# Patient Record
Sex: MALE | Race: AMERICAN INDIAN OR ALASKA NATIVE | NOT HISPANIC OR LATINO | Employment: OTHER | URBAN - METROPOLITAN AREA
[De-identification: names, ages, dates, MRNs, and addresses within clinical notes are randomized per-mention and may not be internally consistent; named-entity substitution may affect disease eponyms.]

---

## 2017-09-09 ENCOUNTER — APPOINTMENT (OUTPATIENT)
Dept: RADIOLOGY | Facility: MEDICAL CENTER | Age: 62
DRG: 987 | End: 2017-09-09
Attending: INTERNAL MEDICINE
Payer: MEDICARE

## 2017-09-09 ENCOUNTER — APPOINTMENT (OUTPATIENT)
Dept: RADIOLOGY | Facility: MEDICAL CENTER | Age: 62
DRG: 987 | End: 2017-09-09
Attending: EMERGENCY MEDICINE
Payer: MEDICARE

## 2017-09-09 ENCOUNTER — HOSPITAL ENCOUNTER (INPATIENT)
Facility: MEDICAL CENTER | Age: 62
LOS: 11 days | DRG: 987 | End: 2017-09-20
Attending: EMERGENCY MEDICINE | Admitting: HOSPITALIST
Payer: MEDICARE

## 2017-09-09 ENCOUNTER — HOSPITAL ENCOUNTER (OUTPATIENT)
Dept: RADIOLOGY | Facility: MEDICAL CENTER | Age: 62
End: 2017-09-09

## 2017-09-09 ENCOUNTER — RESOLUTE PROFESSIONAL BILLING HOSPITAL PROF FEE (OUTPATIENT)
Dept: MEDSURG UNIT | Facility: MEDICAL CENTER | Age: 62
End: 2017-09-09
Payer: MEDICARE

## 2017-09-09 DIAGNOSIS — G40.901 STATUS EPILEPTICUS (HCC): ICD-10-CM

## 2017-09-09 PROBLEM — R56.9 SEIZURES (HCC): Status: ACTIVE | Noted: 2017-09-09

## 2017-09-09 PROBLEM — Z97.8 ENDOTRACHEALLY INTUBATED: Status: ACTIVE | Noted: 2017-09-09

## 2017-09-09 LAB
ALBUMIN SERPL BCP-MCNC: 4 G/DL (ref 3.2–4.9)
ALBUMIN/GLOB SERPL: 1.3 G/DL
ALP SERPL-CCNC: 138 U/L (ref 30–99)
ALT SERPL-CCNC: 43 U/L (ref 2–50)
AMPHET UR QL SCN: NEGATIVE
ANION GAP SERPL CALC-SCNC: 9 MMOL/L (ref 0–11.9)
APTT PPP: 22.2 SEC (ref 24.7–36)
AST SERPL-CCNC: 48 U/L (ref 12–45)
BARBITURATES UR QL SCN: NEGATIVE
BASE EXCESS BLDA CALC-SCNC: 2 MMOL/L (ref -4–3)
BASOPHILS # BLD AUTO: 0.5 % (ref 0–1.8)
BASOPHILS # BLD: 0.05 K/UL (ref 0–0.12)
BENZODIAZ UR QL SCN: POSITIVE
BILIRUB SERPL-MCNC: 0.6 MG/DL (ref 0.1–1.5)
BODY TEMPERATURE: ABNORMAL DEGREES
BUN SERPL-MCNC: 5 MG/DL (ref 8–22)
BZE UR QL SCN: NEGATIVE
CALCIUM SERPL-MCNC: 8.6 MG/DL (ref 8.5–10.5)
CANNABINOIDS UR QL SCN: NEGATIVE
CHLORIDE SERPL-SCNC: 93 MMOL/L (ref 96–112)
CO2 BLDA-SCNC: 29 MMOL/L (ref 20–33)
CO2 SERPL-SCNC: 25 MMOL/L (ref 20–33)
CREAT SERPL-MCNC: 0.78 MG/DL (ref 0.5–1.4)
EOSINOPHIL # BLD AUTO: 0 K/UL (ref 0–0.51)
EOSINOPHIL NFR BLD: 0 % (ref 0–6.9)
ERYTHROCYTE [DISTWIDTH] IN BLOOD BY AUTOMATED COUNT: 52.6 FL (ref 35.9–50)
ETHANOL BLD-MCNC: 0 G/DL
GFR SERPL CREATININE-BSD FRML MDRD: >60 ML/MIN/1.73 M 2
GLOBULIN SER CALC-MCNC: 3 G/DL (ref 1.9–3.5)
GLUCOSE SERPL-MCNC: 135 MG/DL (ref 65–99)
GRAM STN SPEC: NORMAL
HCO3 BLDA-SCNC: 27.4 MMOL/L (ref 17–25)
HCT VFR BLD AUTO: 37 % (ref 42–52)
HGB BLD-MCNC: 13 G/DL (ref 14–18)
IMM GRANULOCYTES # BLD AUTO: 0.07 K/UL (ref 0–0.11)
IMM GRANULOCYTES NFR BLD AUTO: 0.7 % (ref 0–0.9)
INR PPP: 0.86 (ref 0.87–1.13)
LYMPHOCYTES # BLD AUTO: 0.29 K/UL (ref 1–4.8)
LYMPHOCYTES NFR BLD: 2.8 % (ref 22–41)
MCH RBC QN AUTO: 33.7 PG (ref 27–33)
MCHC RBC AUTO-ENTMCNC: 35.1 G/DL (ref 33.7–35.3)
MCV RBC AUTO: 95.9 FL (ref 81.4–97.8)
METHADONE UR QL SCN: NEGATIVE
MONOCYTES # BLD AUTO: 0.39 K/UL (ref 0–0.85)
MONOCYTES NFR BLD AUTO: 3.8 % (ref 0–13.4)
NEUTROPHILS # BLD AUTO: 9.51 K/UL (ref 1.82–7.42)
NEUTROPHILS NFR BLD: 92.2 % (ref 44–72)
NRBC # BLD AUTO: 0 K/UL
NRBC BLD AUTO-RTO: 0 /100 WBC
O2/TOTAL GAS SETTING VFR VENT: 80 %
OPIATES UR QL SCN: NEGATIVE
OXYCODONE UR QL SCN: NEGATIVE
PCO2 BLDA: 45.7 MMHG (ref 26–37)
PCP UR QL SCN: NEGATIVE
PH BLDA: 7.39 [PH] (ref 7.4–7.5)
PHENYTOIN SERPL-MCNC: 9.1 UG/ML (ref 10–20)
PLATELET # BLD AUTO: 299 K/UL (ref 164–446)
PMV BLD AUTO: 7.9 FL (ref 9–12.9)
PO2 BLDA: 101 MMHG (ref 64–87)
POTASSIUM SERPL-SCNC: 4.1 MMOL/L (ref 3.6–5.5)
PROCALCITONIN SERPL-MCNC: 0.28 NG/ML
PROPOXYPH UR QL SCN: NEGATIVE
PROT SERPL-MCNC: 7 G/DL (ref 6–8.2)
PROTHROMBIN TIME: 12 SEC (ref 12–14.6)
RBC # BLD AUTO: 3.86 M/UL (ref 4.7–6.1)
SAO2 % BLDA: 98 % (ref 93–99)
SIGNIFICANT IND 70042: NORMAL
SITE SITE: NORMAL
SODIUM SERPL-SCNC: 127 MMOL/L (ref 135–145)
SOURCE SOURCE: NORMAL
SPECIMEN DRAWN FROM PATIENT: ABNORMAL
WBC # BLD AUTO: 10.3 K/UL (ref 4.8–10.8)

## 2017-09-09 PROCEDURE — 36415 COLL VENOUS BLD VENIPUNCTURE: CPT

## 2017-09-09 PROCEDURE — 700101 HCHG RX REV CODE 250: Performed by: INTERNAL MEDICINE

## 2017-09-09 PROCEDURE — 71010 DX-CHEST-PORTABLE (1 VIEW): CPT

## 2017-09-09 PROCEDURE — 700102 HCHG RX REV CODE 250 W/ 637 OVERRIDE(OP): Performed by: INTERNAL MEDICINE

## 2017-09-09 PROCEDURE — 700105 HCHG RX REV CODE 258: Performed by: INTERNAL MEDICINE

## 2017-09-09 PROCEDURE — 87070 CULTURE OTHR SPECIMN AEROBIC: CPT

## 2017-09-09 PROCEDURE — 85025 COMPLETE CBC W/AUTO DIFF WBC: CPT

## 2017-09-09 PROCEDURE — A9270 NON-COVERED ITEM OR SERVICE: HCPCS | Performed by: INTERNAL MEDICINE

## 2017-09-09 PROCEDURE — 96365 THER/PROPH/DIAG IV INF INIT: CPT

## 2017-09-09 PROCEDURE — 84145 PROCALCITONIN (PCT): CPT

## 2017-09-09 PROCEDURE — 36600 WITHDRAWAL OF ARTERIAL BLOOD: CPT

## 2017-09-09 PROCEDURE — 99291 CRITICAL CARE FIRST HOUR: CPT

## 2017-09-09 PROCEDURE — 80307 DRUG TEST PRSMV CHEM ANLYZR: CPT

## 2017-09-09 PROCEDURE — 700111 HCHG RX REV CODE 636 W/ 250 OVERRIDE (IP)

## 2017-09-09 PROCEDURE — 700111 HCHG RX REV CODE 636 W/ 250 OVERRIDE (IP): Performed by: EMERGENCY MEDICINE

## 2017-09-09 PROCEDURE — 5A1945Z RESPIRATORY VENTILATION, 24-96 CONSECUTIVE HOURS: ICD-10-PCS | Performed by: INTERNAL MEDICINE

## 2017-09-09 PROCEDURE — 82803 BLOOD GASES ANY COMBINATION: CPT

## 2017-09-09 PROCEDURE — 94760 N-INVAS EAR/PLS OXIMETRY 1: CPT

## 2017-09-09 PROCEDURE — 770022 HCHG ROOM/CARE - ICU (200)

## 2017-09-09 PROCEDURE — 85730 THROMBOPLASTIN TIME PARTIAL: CPT

## 2017-09-09 PROCEDURE — 700111 HCHG RX REV CODE 636 W/ 250 OVERRIDE (IP): Performed by: INTERNAL MEDICINE

## 2017-09-09 PROCEDURE — 80185 ASSAY OF PHENYTOIN TOTAL: CPT

## 2017-09-09 PROCEDURE — 85610 PROTHROMBIN TIME: CPT

## 2017-09-09 PROCEDURE — 87205 SMEAR GRAM STAIN: CPT

## 2017-09-09 PROCEDURE — 302135 SEQUENTIAL COMPRESSION MACHINE: Performed by: INTERNAL MEDICINE

## 2017-09-09 PROCEDURE — 700105 HCHG RX REV CODE 258: Performed by: EMERGENCY MEDICINE

## 2017-09-09 PROCEDURE — 96366 THER/PROPH/DIAG IV INF ADDON: CPT

## 2017-09-09 PROCEDURE — 70450 CT HEAD/BRAIN W/O DYE: CPT

## 2017-09-09 PROCEDURE — 94002 VENT MGMT INPAT INIT DAY: CPT

## 2017-09-09 PROCEDURE — 96375 TX/PRO/DX INJ NEW DRUG ADDON: CPT

## 2017-09-09 PROCEDURE — 80053 COMPREHEN METABOLIC PANEL: CPT

## 2017-09-09 PROCEDURE — 302136 NUTRITION PUMP: Performed by: INTERNAL MEDICINE

## 2017-09-09 PROCEDURE — 87040 BLOOD CULTURE FOR BACTERIA: CPT

## 2017-09-09 RX ORDER — THIAMINE MONONITRATE (VIT B1) 100 MG
100 TABLET ORAL DAILY
Status: COMPLETED | OUTPATIENT
Start: 2017-09-10 | End: 2017-09-13

## 2017-09-09 RX ORDER — LORAZEPAM 2 MG/ML
4 INJECTION INTRAMUSCULAR
Status: DISCONTINUED | OUTPATIENT
Start: 2017-09-09 | End: 2017-09-20 | Stop reason: HOSPADM

## 2017-09-09 RX ORDER — FOLIC ACID 1 MG/1
1 TABLET ORAL DAILY
Status: COMPLETED | OUTPATIENT
Start: 2017-09-10 | End: 2017-09-13

## 2017-09-09 RX ORDER — PHENYTOIN SODIUM 100 MG/1
300 CAPSULE, EXTENDED RELEASE ORAL NIGHTLY
Status: DISCONTINUED | OUTPATIENT
Start: 2017-09-09 | End: 2017-09-09

## 2017-09-09 RX ORDER — LIDOCAINE HYDROCHLORIDE 10 MG/ML
1-2 INJECTION, SOLUTION INFILTRATION; PERINEURAL
Status: DISCONTINUED | OUTPATIENT
Start: 2017-09-09 | End: 2017-09-15

## 2017-09-09 RX ORDER — LABETALOL HYDROCHLORIDE 5 MG/ML
10 INJECTION, SOLUTION INTRAVENOUS EVERY 4 HOURS PRN
Status: DISCONTINUED | OUTPATIENT
Start: 2017-09-09 | End: 2017-09-20 | Stop reason: HOSPADM

## 2017-09-09 RX ORDER — BISACODYL 10 MG
10 SUPPOSITORY, RECTAL RECTAL
Status: DISCONTINUED | OUTPATIENT
Start: 2017-09-09 | End: 2017-09-20 | Stop reason: HOSPADM

## 2017-09-09 RX ORDER — CHLORHEXIDINE GLUCONATE ORAL RINSE 1.2 MG/ML
15 SOLUTION DENTAL 2 TIMES DAILY
Status: DISCONTINUED | OUTPATIENT
Start: 2017-09-09 | End: 2017-09-13

## 2017-09-09 RX ORDER — PHENYTOIN SODIUM 50 MG/ML
100 INJECTION INTRAMUSCULAR; INTRAVENOUS EVERY 8 HOURS
Status: DISCONTINUED | OUTPATIENT
Start: 2017-09-09 | End: 2017-09-14

## 2017-09-09 RX ORDER — SODIUM CHLORIDE 9 MG/ML
INJECTION, SOLUTION INTRAVENOUS CONTINUOUS
Status: DISCONTINUED | OUTPATIENT
Start: 2017-09-09 | End: 2017-09-11

## 2017-09-09 RX ORDER — DEXTROSE MONOHYDRATE 25 G/50ML
25 INJECTION, SOLUTION INTRAVENOUS
Status: DISCONTINUED | OUTPATIENT
Start: 2017-09-09 | End: 2017-09-20 | Stop reason: HOSPADM

## 2017-09-09 RX ORDER — POLYETHYLENE GLYCOL 3350 17 G/17G
1 POWDER, FOR SOLUTION ORAL
Status: DISCONTINUED | OUTPATIENT
Start: 2017-09-09 | End: 2017-09-20 | Stop reason: HOSPADM

## 2017-09-09 RX ORDER — AMOXICILLIN 250 MG
2 CAPSULE ORAL 2 TIMES DAILY
Status: DISCONTINUED | OUTPATIENT
Start: 2017-09-09 | End: 2017-09-20 | Stop reason: HOSPADM

## 2017-09-09 RX ADMIN — SODIUM CHLORIDE: 9 INJECTION, SOLUTION INTRAVENOUS at 22:11

## 2017-09-09 RX ADMIN — CHLORHEXIDINE GLUCONATE 15 ML: 1.2 RINSE ORAL at 11:52

## 2017-09-09 RX ADMIN — SODIUM CHLORIDE: 9 INJECTION, SOLUTION INTRAVENOUS at 10:08

## 2017-09-09 RX ADMIN — PROPOFOL 40 MCG/KG/MIN: 10 INJECTION, EMULSION INTRAVENOUS at 22:18

## 2017-09-09 RX ADMIN — AMPICILLIN SODIUM AND SULBACTAM SODIUM 3 G: 2; 1 INJECTION, POWDER, FOR SOLUTION INTRAMUSCULAR; INTRAVENOUS at 18:42

## 2017-09-09 RX ADMIN — PHENYTOIN SODIUM 100 MG: 50 INJECTION INTRAMUSCULAR; INTRAVENOUS at 20:36

## 2017-09-09 RX ADMIN — STANDARDIZED SENNA CONCENTRATE AND DOCUSATE SODIUM 2 TABLET: 8.6; 5 TABLET, FILM COATED ORAL at 19:48

## 2017-09-09 RX ADMIN — AMPICILLIN SODIUM AND SULBACTAM SODIUM 3 G: 2; 1 INJECTION, POWDER, FOR SOLUTION INTRAMUSCULAR; INTRAVENOUS at 13:12

## 2017-09-09 RX ADMIN — CHLORHEXIDINE GLUCONATE 15 ML: 1.2 RINSE ORAL at 19:48

## 2017-09-09 RX ADMIN — ENOXAPARIN SODIUM 40 MG: 100 INJECTION SUBCUTANEOUS at 11:49

## 2017-09-09 RX ADMIN — FOLIC ACID: 5 INJECTION, SOLUTION INTRAMUSCULAR; INTRAVENOUS; SUBCUTANEOUS at 15:13

## 2017-09-09 RX ADMIN — DEXTROSE MONOHYDRATE 500 MG: 50 INJECTION, SOLUTION INTRAVENOUS at 19:49

## 2017-09-09 RX ADMIN — PROPOFOL 50 MCG/KG/MIN: 10 INJECTION, EMULSION INTRAVENOUS at 19:01

## 2017-09-09 RX ADMIN — PROPOFOL 50 MCG/KG/MIN: 10 INJECTION, EMULSION INTRAVENOUS at 13:11

## 2017-09-09 RX ADMIN — DEXTROSE MONOHYDRATE 500 MG: 50 INJECTION, SOLUTION INTRAVENOUS at 09:25

## 2017-09-09 RX ADMIN — PHENYTOIN SODIUM 100 MG: 50 INJECTION INTRAMUSCULAR; INTRAVENOUS at 11:50

## 2017-09-09 RX ADMIN — PROPOFOL 50 MCG/KG/MIN: 10 INJECTION, EMULSION INTRAVENOUS at 15:13

## 2017-09-09 RX ADMIN — AMPICILLIN SODIUM AND SULBACTAM SODIUM 3 G: 2; 1 INJECTION, POWDER, FOR SOLUTION INTRAMUSCULAR; INTRAVENOUS at 22:11

## 2017-09-09 RX ADMIN — PROPOFOL 5 MCG/KG/MIN: 10 INJECTION, EMULSION INTRAVENOUS at 07:09

## 2017-09-09 ASSESSMENT — LIFESTYLE VARIABLES: EVER_SMOKED: UNABLE TO EVALUATE AT THIS TIME - NEEDS ASSESSMENT PRIOR TO DISCHARGE

## 2017-09-09 ASSESSMENT — PAIN SCALES - GENERAL: PAINLEVEL_OUTOF10: ASSUMED PAIN PRESENT

## 2017-09-09 NOTE — ED NOTES
Chief Complaint   Patient presents with   • Seizure     BIB med flight as transfer from [place]. Pt had 20 min witnessed sz at home with family. Pt taken to [place] where he cont to have numerous 3-4 min sz. Unable to maintain airway, intubated and transfered here on versed drip.     Pt to rm 8 with above.   Hx of ETOH, epilepsy and HTN.   Per report, pt was drinking last night prior to sz.  Pt connected to vent, deep suctioned by RT. O2 SAT 95%. HR: 109. Other VSS.  Received 100 murphy at 0600 during med-flight transfer.   Chart up for ERP.

## 2017-09-09 NOTE — CONSULTS
NEUROLOGY CONSULT         CHIEF COMPLAINT:  Chief Complaint   Patient presents with   • Seizure     BIB med flight as transfer from [place]. Pt had 20 min witnessed sz at home with family. Pt taken to [place] where he cont to have numerous 3-4 min sz. Unable to maintain airway, intubated and transfered here on versed drip.         Date of Service:  09/09/17    REQUESTING PHYSICIAN: Miles San M.D.       HISTORY OF PRESENT ILLNESS:    Crescencoi Eller is a 62 y.o. male with a history of traumatic brain injury and seizure d/o on He is on Dilantin 300 mg QHS as well as alcohol abuse who was transferred from an OSH for status epilpeticus.  Family is not at bedside for additional history.  However, from chart review, he did drink prior to the seizure, and then presented with 20 minutes of seizure activity. His Dilantin level was 9  He was given Dilantin 1 g IV and transferred here intubated and on a versed drip.         ROS:  Unable to be obtained due to the patient's current medical condition.       PAST MEDICAL HISTORY:  Past Medical History:   Diagnosis Date   • Alcoholism /alcohol abuse (CMS-HCC)    • Backpain    • Fall    • Head trauma    • Hypertension    • Seizure disorder (CMS-HCC)          ALLERGIES:  No Known Allergies      MEDICATIONS:  No current facility-administered medications on file prior to encounter.      Current Outpatient Prescriptions on File Prior to Encounter   Medication Sig Dispense Refill   • docusate sodium (COLACE) 100 MG CAPS Take 100 mg by mouth 2 times a day. TAKE ONE CAP WITH BREAKFAST AND DINNER. STOOL SOFTENER-HOLD FOR LOOSE STOOLS.      • phenytoin (DILANTIN) 300 MG ER capsule Take  by mouth. TAKE ONE TAB AT BEDTIME TO PREVENT SEIZURES      • famotidine (PEPCID) 20 MG TABS Take  by mouth. TAKE ONE TAB TWO TIMES DAILY BEFORE MEALS FOR INDIGESTION      • metoprolol (LOPRESSOR) 25 MG TABS Take  by mouth. TAKE ONE TAB EVERY MORNING TO CONTROL BLOOD PRESSURE      • Multiple  Vitamins-Minerals (THERAGRAN-M PO) Take  by mouth. MULTIPLE VITAMIN-TAKE ONE EVERY DAY WITH LUNCH          MEDICATIONS:    Current Facility-Administered Medications:   •  levetiracetam (KEPPRA) 500 mg in D5W 100 mL IVPB, 500 mg, Intravenous, Q12HRS, Miles Dawson M.D., Stopped at 09/09/17 0940  •  senna-docusate (PERICOLACE or SENOKOT S) 8.6-50 MG per tablet 2 Tab, 2 Tab, Oral, BID **AND** polyethylene glycol/lytes (MIRALAX) PACKET 1 Packet, 1 Packet, Oral, QDAY PRN **AND** magnesium hydroxide (MILK OF MAGNESIA) suspension 30 mL, 30 mL, Oral, QDAY PRN **AND** bisacodyl (DULCOLAX) suppository 10 mg, 10 mg, Rectal, QDAY PRN, Mitch James M.D.  •  Respiratory Care per Protocol, , Nebulization, Continuous RT, Mitch James M.D.  •  NS infusion, , Intravenous, Continuous, Mitch James M.D., Last Rate: 83 mL/hr at 09/09/17 1008  •  labetalol (NORMODYNE,TRANDATE) injection 10 mg, 10 mg, Intravenous, Q4HRS PRN, Mitch James M.D.  •  Action is required: Protocol 1073 Hypoglycemia has been implemented, , , Once **AND** Protocol 1073 Inclusion Criteria, , , CONTINUOUS **AND** Protocol 1073 NOTIFY, , , Once **AND** Protocol 1073 Initiate protocol immediately if FSBG is less than or equal to 70 mg/dL, , , CONTINUOUS **AND** glucose 4 g chewable tablet 16 g, 16 g, Oral, Q15 MIN PRN **AND** dextrose 50% (D50W) injection 25 mL, 25 mL, Intravenous, Q15 MIN PRN, Mitch James M.D.  •  lorazepam (ATIVAN) injection 4 mg, 4 mg, Intravenous, Q10 MIN PRN, Mitch James M.D.  •  Rally Bag infusion, , Intravenous, Once **AND** [START ON 9/10/2017] thiamine (THIAMINE) tablet 100 mg, 100 mg, Oral, DAILY **AND** [START ON 9/10/2017] folic acid (FOLVITE) tablet 1 mg, 1 mg, Oral, DAILY **AND** [START ON 9/10/2017] multivitamin (THERAGRAN) tablet 1 Tab, 1 Tab, Oral, DAILY, Mitch James M.D.  •  ampicillin/sulbactam (UNASYN) 3 g in  mL IVPB, 3 g, Intravenous, Q6HRS, Mitch James M.D.  •  enoxaparin (LOVENOX) inj 40 mg, 40 mg, Subcutaneous,  DAILY, Mitch James M.D., 40 mg at 09/09/17 1149  •  phenytoin (DILANTIN) injection 100 mg, 100 mg, Intravenous, Q8HRS, Mitch James M.D., 100 mg at 09/09/17 1150  •  chlorhexidine (PERIDEX) 0.12 % solution 15 mL, 15 mL, Mouth/Throat, BID, Jessica Clark M.D., 15 mL at 09/09/17 1152  •  lidocaine (XYLOCAINE) 1%  injection, 1-2 mL, Tracheal Tube, Q30 MIN PRN, Jessica Clark M.D.  •  MD ALERT...Adult ICU Electrolyte Replacement per Pharmacy Protocol, , Other, pharmacy to dose, Jessica Clark M.D.  •  Action is required: Protocol 452 Propofol Critical Care has been implemented, , , CONTINUOUS **AND** propofol (DIPRIVAN) injection, 0-80 mcg/kg/min, Intravenous, Continuous, Last Rate: 29.7 mL/hr at 09/09/17 1100, 50 mcg/kg/min at 09/09/17 1100 **AND** Propofol Critical Care Protocol - Patient Must Have an Advanced Airway with Mechanical Ventilation in Use., , , CONTINUOUS **AND** Propofol Critical Care Protocol - Spontaneous breathing trials may be attempted while receiving propofol, , , CONTINUOUS **AND** Propofol Critical Care Protocol - If patient meets extubation criteria, propofol MUST be discontinued prior to extubation, , , CONTINUOUS **AND** Propofol Critical Care Protocol - No allergy to propofol, soybean oil, or eggs, , , CONTINUOUS **AND** Propofol Critical Care Protocol - Do not administer this medication to children under the age of 12, , , CONTINUOUS **AND** Propofol Critical Care Protocol - Dedicated IV line for administration (vehicle supports rapid growth of microorganisms and incompatibilities cannot be detected), , , CONTINUOUS **AND** Propofol Critical Care Protocol - Administration tubing and any unused emulsion must be changed out and discarded after 12 hours from spiking vial, , , CONTINUOUS **AND** Propofol Critical Care Protocol - RASS guildelines, , , CONTINUOUS **AND** Propofol Critical Care Protocol - Wake-up assessment as ordered by MD, , , CONTINUOUS **AND** Propofol Critical Care  Protocol - Propofol is not an analgesic, concurrent analgesia (if patient experiencing pain) should continue while patient is on propofol, , , CONTINUOUS **AND** [START ON 9/11/2017] Triglycerides Starting now and then Every 3 Days, , , Every 3 Days (0300) **AND** Propofol Critical Care Protocol - Notify MD prior to exceeding 80 mcg/kg/min and obtain new order for higher limit, , , CONTINUOUS, Felix Pak, PharmLEW    FAMILY HISTORY:  Unable to be obtained.     SOCIAL HISTORY:  Social History     Social History   • Marital status: Single     Spouse name: N/A   • Number of children: N/A   • Years of education: N/A     Occupational History   • Not on file.     Social History Main Topics   • Smoking status: Current Every Day Smoker   • Smokeless tobacco: Not on file      Comment: 30 years off and on unknown amt. per family   • Alcohol use Yes   • Drug use: No   • Sexual activity: Not on file     Other Topics Concern   • Not on file     Social History Narrative   • No narrative on file       EXAM:  Vitals:    09/09/17 1045 09/09/17 1053 09/09/17 1100 09/09/17 1115   BP:       Pulse: (!) 108  93 91   Resp: (!) 58  16 16   Temp:       SpO2:  96%     Weight:       Height:         General: pt is lying in bed, intubated.   HEENT: normocephalic, atraumatic,   EYes: anicteric, PERRLA, pupils midline  Neck: supple, no carotid bruits  Chest: CTA b/l no wheezing or rales  Heart: regular rate and rhythm, no murmurs  Abd: soft, non-tender, non distended  Extremtiies: no edema  Skin: abrasion on right knee.    Neuro  General: pt is intubated, slightly opens eyes to voice and turns to voice but does not track or follow commands  CN: PERRLA, pupils midline, + cough,   Motor: normal tone. Agitated and moving left arm and leg well.  Only slightly withdraws in the right arm and leg.   DTR: 1+ throughout  Plantar reflexes: downgoing b/l       LABORATORY TESTING  Lab Results   Component Value Date/Time    WBC 10.3 09/09/2017 07:00 AM     RBC 3.86 (L) 09/09/2017 07:00 AM    HEMOGLOBIN 13.0 (L) 09/09/2017 07:00 AM    HEMATOCRIT 37.0 (L) 09/09/2017 07:00 AM    MCV 95.9 09/09/2017 07:00 AM    MCH 33.7 (H) 09/09/2017 07:00 AM    MCHC 35.1 09/09/2017 07:00 AM    MPV 7.9 (L) 09/09/2017 07:00 AM    NEUTSPOLYS 92.20 (H) 09/09/2017 07:00 AM    LYMPHOCYTES 2.80 (L) 09/09/2017 07:00 AM    MONOCYTES 3.80 09/09/2017 07:00 AM    EOSINOPHILS 0.00 09/09/2017 07:00 AM    BASOPHILS 0.50 09/09/2017 07:00 AM        IMAGING:  [unfilled]      IMPRESSION AND PLAN: Crescencio Eller is a  62 y.o male with history of TBI in 2010 and seizure d/o who presents in status epilepticus. He is now intubated, but does not seem to be in status epilepticus as he attends to voice and moves his left side well. He does not move his right side as much, but this might be from his TBI as he did have a right hemiparesis.     Recommend:  --continue Keppra 500 mg IV BID  --continue Fosphenytoin 100 mg TID  --await family for more information. If he is not typically weak on the right side, then would get MRI of the Brain without contrast.    --extubate once medically appropriate.     Amy Beltran M.D.    Neurology

## 2017-09-09 NOTE — PROGRESS NOTES
Cortrak Placement    Tube Team verified patient name and medical record number prior to tube placement.  Cortrak tube (55 inches, 10 Dutch) placed at 78 cm in left nare.  Per Cortrak picture, tube appears to be in the stomach.  Nursing Instructions: Awaiting KUB to confirm placement before use for medications or feeding. Once placement confirmed, flush tube with 30 ml of water, and then remove and save stylet, in patient medication drawer.

## 2017-09-09 NOTE — PROGRESS NOTES
Dr. Beltran neurologist to assess patient, cancelled order for EEG. Orders given to keep patient calm and extubate when ready.

## 2017-09-09 NOTE — CARE PLAN
Problem: Respiratory:  Goal: Respiratory status will improve  Outcome: PROGRESSING AS EXPECTED  WiIll suction and preform oral car every 2 hours. Will collaborate with Respiratory Therapist to assess and monitor patients respiratory status. Will educate patient on coughing while on the vent and reasons for frequent suctioning.     Problem: Safety  Goal: Will remain free from falls  Outcome: PROGRESSING AS EXPECTED  Bed alaram on, call light within reach. Patient oriented to person, place and time event. Bed in low position and close to nursing station.

## 2017-09-09 NOTE — DIETARY
"Nutrition Support Assessment - Male    Crescencio Eller is a 62 y.o. male with admitting DX of Seizures, Endotracheally intubated    Pertinent History: alcoholism/alcohol abuse, back pain, fall, head trauma, HTN, seizure disorder  Allergies:  Review of patient's allergies indicates no known allergies.    Height: 177.8 cm (5' 10\")  Weight: 98.9 kg (218 lb 0.6 oz)  Ideal Body Weight: 75.3 kg (166 lb)  Percent Ideal Body Weight: 131.3  Body mass index is 31.28 kg/m².     Pertinent Labs: Na 127, Glucose 135, BUN 5    Pertinent Medications: unasyn, peridex, lovenox, keppra, electrolyte replacement, dilantin, pericolace, thiamine, folvite, MVI, propofol @ 29.7 ml/hr (784 kcal)  Pertinent Fluids: NS infusion @ 83 ml/hr, rally bag @ 42 ml/hr  Surgery / Procedures: None noted in chart  Skin: No skin breakdown noted in chart  GI: Last BM PTA     Estimated Needs: REE per MSJ x 1 = 1797 kcal/day (65-70%=6177-5860 kcal/day); RMR per [facility] (vent L/min 7.7, T max 24 hours 37.4) = 1861 kcal/day (65-70%=3052-5130 kcal/day)  Total Calories / day: 1088 - 1385  (Calories / k - 14)  Total Grams Protein / day: 113 - 151  (Grams Protein / kg IBW: 1.5 - 2)  Total Fluids ml / day: 2476.9 ml         Assessment / Evaluation:   Consult received for TF assessment and metabolic cart (not indicated). Pt is intubated and on propofol @ 29.7 ml/hr which provides 784 kcal/day;RD will adjust TF as needed. Estimated needs based on critically ill obese pt BMI >30.  Peptamen Intense VHP appropriate to meet pt's estimated protein needs. Of note due to high propofol will not be able to hypocalorically feed pt while on propofol.    Plan / Recommendation:   Start Peptamen Intense VHP @ 25 ml/hr and advance per protocol to 45 ml/hr (goal rate with propofol) to provide 1080 kcal + kcal from propofol (19 kcal/kg), 99 grams protein (1 gm/kg, 1.3 gm/kg IBW), and 907 ml free water per day. Also provide 1 scoop/packet Beneprotein 3x/day (3 total) to provide an " additional 75 kcals and 18 grams protein. Total daily protein intake = 117 grams (1.2 gm/kg, 1.6 gm/kg IBW).   When propofol d/c'd increase TF to final goal rate of 55 ml/hr to provide 1320 kcal (13 kcal/kg), 121 grams protein (1.2 gm/kg, 1.6 gm/kg IBW), and 1109 ml free water per day; D/c beneprotein flushes.  Fluids per MD.     RD following.

## 2017-09-09 NOTE — H&P
DATE OF SERVICE:  09/09/2017    CHIEF COMPLAINT:  Seizures; acute hypoxemic respiratory failure, status post   intubation.    HISTORY OF PRESENT ILLNESS:  This is a 62-year-old  gentleman   with a past medical history significant for traumatic brain injury, alcohol   abuse, and seizures, noncompliant with medication, initially presented to   [facility] with a witnessed seizure at home.  He presented at   4:00 a.m. in the morning at the hospital and at 4:30, there were multiple   witnessed seizure episodes at [facility] for which he received   Ativan and then eventually received Versed.  He had to be intubated to protect   his airway and he was given paralytic and sedative agents for the seizures.    He was then transferred from [facility] to [facility] for further   care.  Of note, while he was at [facility], his phenytoin level   was 9.3, which was subtherapeutic.    While here in [facility], ER physician consulted neurology who did not recommend   an EEG at this time, but recommended Keppra and continuation of phenytoin in   IV form for now.  Plan is to eventually extubate the patient and wean him off   sedation and then do an EEG per neurology. Attempts have been made to reach   out to family, but so far have been unsuccessful and it is unknown at this   point if the patient has any focal neurological deficits at baseline or not.    PAST MEDICAL HISTORY:  1.  Traumatic brain injury.  2.  Alcohol withdrawal seizures.  3.  Alcohol abuse.    FAMILY HISTORY:  Unknown.    SOCIAL HISTORY:  Unknown tobacco use, possible alcohol consumption, but   unknown quantity.  Unknown recreational drug use.    ALLERGIES:  No known drug allergies.    MEDICATIONS:  1.  Metoprolol 25 mg daily.  2.  Phenytoin 300 mg at bedtime.  3.  Loratadine 200 mg at bedtime.  4.  Ranitidine 150 mg b.i.d.    REVIEW OF SYSTEMS:  Unable to obtain due to the patient being  intubated.    PHYSICAL EXAMINATION:  VITAL SIGNS:  Temperature afebrile, heart rate 90s, respiratory rate 20, blood   pressure 100s/70s, oxygen saturation 98%.  CONSTITUTIONAL:  The patient is sedated.  HEENT:  Endotracheal tube in place.  Pupils equally reactive to light and   accommodation.  PULMONARY:  Bilateral basal crackles.  CARDIOVASCULAR:  Regular rate and rhythm without murmurs, rubs or gallops.  ABDOMEN:  Soft, nontender, bowel sounds present.  EXTREMITIES:  No edema, cyanosis or clubbing.  NEUROLOGIC:  The patient is sedated; however, appears to be moving left-sided   extremities and is only withdrawing right-sided extremities to painful   stimuli.    LABORATORY DATA:  White count of 10.3, hemoglobin 13.  Sodium of 127, chloride   of 93, potassium 4.1, glucose of 135.  AST 48, .  Phenytoin level of   9.1.  Urine drug screen positive for benzodiazepines and a negative UA.    IMAGING:  Chest x-ray shows bilateral hazy infiltrates.    CT of the head shows bilateral encephalomalacia, left greater than right.  No evidence of   intracranial hemorrhage, midline shift, or mass effect.    IMPRESSION:  This is a 62-year-old  gentleman who presented   with status epilepticus.  This is most likely alcohol withdrawal related as evidenced by   history of alcohol abuse.  He also has history of noncompliance of medications.  He is   intubated right now but does not seem to have any seizures currently.  He does  however have a lot of respiratory secretions from his ET tube and it could be   possible that he may have an aspiration pneumonia as well.    ASSESSMENT AND PLAN:  1.  Status epilepticus.  2.  Alcohol withdrawal.  3.  Aspiration pneumonia.  4.  Traumatic brain injury.  5.  Noncompliance with medication.  6.  History of right-sided hemiparesis.  7.  ?Hypertension.    PLAN:  1.  Started on Keppra 500 mg b.i.d. IV in the ER, which will be continued on   admission.  2.  Continue phenytoin 100 mg  t.i.d. in IV form.  3.  Unasyn 3 g q. 6 hours for aspiration pneumonia.  4.  Culture and susceptibility of sputum.  5.  Blood cultures.  6.  EEG to be done.  7.  Follow neurology recommendations.  8.  Continue ventilation management for airway protection.  9.  Attempt to reach family to obtain more history.       ____________________________________     MD MURIEL Prater / FLAKITA    DD:  09/09/2017 14:44:28  DT:  09/09/2017 15:27:25    D#:  [id number]  Job#:  [id number]

## 2017-09-09 NOTE — PROGRESS NOTES
Patient arrived at [facility]  to [place] [id number] from ER Red 8 on ventilator with RT assist. Vitals Temp: 99.1 (hernandez), HR: 100, BP: 108/72, SpO2: 98%, RR: 16, Weight: 98.9.

## 2017-09-09 NOTE — ED NOTES
Unable to complete Med Rec at this time, will call pharmacies in his town when they open and try family

## 2017-09-09 NOTE — ED PROVIDER NOTES
ED Provider Note    CHIEF COMPLAINT  Chief Complaint   Patient presents with   • Seizure     BIB med flight as transfer from [place]. Pt had 20 min witnessed sz at home with family. Pt taken to [facility] where he cont to have numerous 3-4 min sz. Unable to maintain airway, intubated and transfered here on versed drip.       HPI  Crescencio Eller is a 62 y.o. male who presentsFor evaluation of reported seizure activity. The patient has a prior history of traumatic brain injury. He is on Dilantin. He also has a history of alcohol abuse. I did not receive any direct history from the family of the patient as he was transferred here. Apparently he presented to smoke in the hospital and was in status epilepticus. He was given as above as obtained and continued to seize and was subsequent paralyzed and intubated. He was flown here. They did perform some basic laboratory studies electrolytes were unremarkable other than a sodium of 127 and his Dilantin level was 9.6. They gave him an IV load of Dilantin and transferred him here. On arrival he is hemodynamic stable no apparent ongoing or active seizures. He was on a Versed drip.    REVIEW OF SYSTEMS  See HPI for further details. Unavailable All other systems are negative.     PAST MEDICAL HISTORY  Past Medical History:   Diagnosis Date   • Alcoholism /alcohol abuse (CMS-HCC)    • Backpain    • Fall    • Head trauma    • Hypertension    • Seizure disorder (CMS-HCC)        FAMILY HISTORY  Unknown    SOCIAL HISTORY  Social History     Social History   • Marital status: Single     Spouse name: N/A   • Number of children: N/A   • Years of education: N/A     Social History Main Topics   • Smoking status: Current Every Day Smoker   • Smokeless tobacco: Not on file      Comment: 30 years off and on unknown amt. per family   • Alcohol use Yes   • Drug use: No   • Sexual activity: Not on file     Other Topics Concern   • Not on file     Social History Narrative   • No narrative on  file     History of alcohol abuse lives in [place]  SURGICAL HISTORY  Past Surgical History:   Procedure Laterality Date   • SOCORRO HOLES         CURRENT MEDICATIONS  Home Medications    **Home medications have not yet been reviewed for this encounter**         Current Facility-Administered Medications:   •  levetiracetam (KEPPRA) 500 mg in D5W 100 mL IVPB, 500 mg, Intravenous, Q12HRS, Miles Dawson M.D., Stopped at 09/09/17 0940  •  Action is required: Protocol 452 Propofol Critical Care has been implemented, , , CONTINUOUS **AND** propofol (DIPRIVAN) injection, 0-80 mcg/kg/min, Intravenous, Continuous, Last Rate: 19.2 mL/hr at 09/09/17 0945, 40 mcg/kg/min at 09/09/17 0945 **AND** Propofol Critical Care Protocol - Patient Must Have an Advanced Airway with Mechanical Ventilation in Use., , , CONTINUOUS **AND** Propofol Critical Care Protocol - Spontaneous breathing trials may be attempted while receiving propofol, , , CONTINUOUS **AND** Propofol Critical Care Protocol - If patient meets extubation criteria, propofol MUST be discontinued prior to extubation, , , CONTINUOUS **AND** Propofol Critical Care Protocol - No allergy to propofol, soybean oil, or eggs, , , CONTINUOUS **AND** Propofol Critical Care Protocol - Do not administer this medication to children under the age of 12, , , CONTINUOUS **AND** Propofol Critical Care Protocol - Dedicated IV line for administration (vehicle supports rapid growth of microorganisms and incompatibilities cannot be detected), , , CONTINUOUS **AND** Propofol Critical Care Protocol - Administration tubing and any unused emulsion must be changed out and discarded after 12 hours from spiking vial, , , CONTINUOUS **AND** Propofol Critical Care Protocol - RASS guildelines, , , CONTINUOUS **AND** Propofol Critical Care Protocol - Wake-up assessment as ordered by MD, , , CONTINUOUS **AND** Propofol Critical Care Protocol - Propofol is not an analgesic, concurrent analgesia (if patient  experiencing pain) should continue while patient is on propofol, , , CONTINUOUS **AND** Triglycerides Starting now and then Every 3 Days, , , Every 3 Days (0300) **AND** Propofol Critical Care Protocol - Notify MD prior to exceeding 80 mcg/kg/min and obtain new order for higher limit, , , CONTINUOUS, Felix Pak, PharmD  •  senna-docusate (PERICOLACE or SENOKOT S) 8.6-50 MG per tablet 2 Tab, 2 Tab, Oral, BID **AND** polyethylene glycol/lytes (MIRALAX) PACKET 1 Packet, 1 Packet, Oral, QDAY PRN **AND** magnesium hydroxide (MILK OF MAGNESIA) suspension 30 mL, 30 mL, Oral, QDAY PRN **AND** bisacodyl (DULCOLAX) suppository 10 mg, 10 mg, Rectal, QDAY PRN, Mitch James M.D.  •  Respiratory Care per Protocol, , Nebulization, Continuous RT, Mitch James M.D.  •  NS infusion, , Intravenous, Continuous, Mitch James M.D., Last Rate: 83 mL/hr at 09/09/17 1008  •  labetalol (NORMODYNE,TRANDATE) injection 10 mg, 10 mg, Intravenous, Q4HRS PRN, Mitch James M.D.  •  Action is required: Protocol 1073 Hypoglycemia has been implemented, , , Once **AND** Protocol 1073 Inclusion Criteria, , , CONTINUOUS **AND** Protocol 1073 NOTIFY, , , Once **AND** Protocol 1073 Initiate protocol immediately if FSBG is less than or equal to 70 mg/dL, , , CONTINUOUS **AND** glucose 4 g chewable tablet 16 g, 16 g, Oral, Q15 MIN PRN **AND** dextrose 50% (D50W) injection 25 mL, 25 mL, Intravenous, Q15 MIN PRN, Mitch James M.D.  •  lorazepam (ATIVAN) injection 4 mg, 4 mg, Intravenous, Q10 MIN PRN, Mitch James M.D.  •  phenytoin ER (DILANTIN) capsule 300 mg, 300 mg, Oral, Nightly, Mitch Aluru, M.D.  •  Rally Bag infusion, , Intravenous, Once **AND** [START ON 9/10/2017] thiamine (THIAMINE) tablet 100 mg, 100 mg, Oral, DAILY **AND** [START ON 9/10/2017] folic acid (FOLVITE) tablet 1 mg, 1 mg, Oral, DAILY **AND** [START ON 9/10/2017] multivitamin (THERAGRAN) tablet 1 Tab, 1 Tab, Oral, DAILY, Mitch James M.D.  •  ampicillin/sulbactam (UNASYN) 3 g in NS  "100 mL IVPB, 3 g, Intravenous, Q6HRS, Mitch James M.D.    Current Outpatient Prescriptions:   •  docusate sodium (COLACE) 100 MG CAPS, Take 100 mg by mouth 2 times a day. TAKE ONE CAP WITH BREAKFAST AND DINNER. STOOL SOFTENER-HOLD FOR LOOSE STOOLS. , Disp: , Rfl:   •  phenytoin (DILANTIN) 300 MG ER capsule, Take  by mouth. TAKE ONE TAB AT BEDTIME TO PREVENT SEIZURES , Disp: , Rfl:   •  famotidine (PEPCID) 20 MG TABS, Take  by mouth. TAKE ONE TAB TWO TIMES DAILY BEFORE MEALS FOR INDIGESTION , Disp: , Rfl:   •  metoprolol (LOPRESSOR) 25 MG TABS, Take  by mouth. TAKE ONE TAB EVERY MORNING TO CONTROL BLOOD PRESSURE , Disp: , Rfl:   •  Multiple Vitamins-Minerals (THERAGRAN-M PO), Take  by mouth. MULTIPLE VITAMIN-TAKE ONE EVERY DAY WITH LUNCH , Disp: , Rfl:     ALLERGIES  No Known Allergies    PHYSICAL EXAM  VITAL SIGNS: /84   Pulse (!) 106   Temp 36.6 °C (97.8 °F)   Resp (!) 24   Ht 1.778 m (5' 10\")   Wt 79.8 kg (176 lb)   SpO2 95%   BMI 25.25 kg/m²        Constitutional: Obtunded intubated  HENT: Normocephalic, Atraumatic, Bilateral external ears normal, Oropharynx moist, No oral exudates, Nose normal. 8.0 endotracheal tube 24 cm to the lips  Eyes: PERRLA, EOMI, Conjunctiva normal, No discharge.   Neck: Normal range of motion, No tenderness, Supple, No stridor.   Cardiovascular: Tachycardic Normal rhythm, No murmurs, No rubs, No gallops.   Thorax & Lungs: Normal breath sounds, No respiratory distress, No wheezing, No chest tenderness.   Abdomen: Bowel sounds normal, Soft, No tenderness, No masses, No pulsatile masses.   Skin: Warm, Dry, No erythema, No rash.   Extremities: Intact distal pulses, No edema, No tenderness, No cyanosis, No clubbing.   Neurologic: GCS 3T      RADIOLOGY/PROCEDURES  CT-HEAD W/O   Final Result      Bitemporal encephalomalacia, left greater than right without evidence of intracranial hemorrhage, midline shift or mass effect.      DX-CHEST-PORTABLE (1 VIEW)   Final Result      1.  " Bibasilar underinflation atelectasis which could obscure an additional process.   2.  Endotracheal tube tip terminates at the level of the aortic arch        Results for orders placed or performed during the hospital encounter of 09/09/17   CBC WITH DIFFERENTIAL   Result Value Ref Range    WBC 10.3 4.8 - 10.8 K/uL    RBC 3.86 (L) 4.70 - 6.10 M/uL    Hemoglobin 13.0 (L) 14.0 - 18.0 g/dL    Hematocrit 37.0 (L) 42.0 - 52.0 %    MCV 95.9 81.4 - 97.8 fL    MCH 33.7 (H) 27.0 - 33.0 pg    MCHC 35.1 33.7 - 35.3 g/dL    RDW 52.6 (H) 35.9 - 50.0 fL    Platelet Count 299 164 - 446 K/uL    MPV 7.9 (L) 9.0 - 12.9 fL    Neutrophils-Polys 92.20 (H) 44.00 - 72.00 %    Lymphocytes 2.80 (L) 22.00 - 41.00 %    Monocytes 3.80 0.00 - 13.40 %    Eosinophils 0.00 0.00 - 6.90 %    Basophils 0.50 0.00 - 1.80 %    Immature Granulocytes 0.70 0.00 - 0.90 %    Nucleated RBC 0.00 /100 WBC    Neutrophils (Absolute) 9.51 (H) 1.82 - 7.42 K/uL    Lymphs (Absolute) 0.29 (L) 1.00 - 4.80 K/uL    Monos (Absolute) 0.39 0.00 - 0.85 K/uL    Eos (Absolute) 0.00 0.00 - 0.51 K/uL    Baso (Absolute) 0.05 0.00 - 0.12 K/uL    Immature Granulocytes (abs) 0.07 0.00 - 0.11 K/uL    NRBC (Absolute) 0.00 K/uL   COMP METABOLIC PANEL   Result Value Ref Range    Sodium 127 (L) 135 - 145 mmol/L    Potassium 4.1 3.6 - 5.5 mmol/L    Chloride 93 (L) 96 - 112 mmol/L    Co2 25 20 - 33 mmol/L    Anion Gap 9.0 0.0 - 11.9    Glucose 135 (H) 65 - 99 mg/dL    Bun 5 (L) 8 - 22 mg/dL    Creatinine 0.78 0.50 - 1.40 mg/dL    Calcium 8.6 8.5 - 10.5 mg/dL    AST(SGOT) 48 (H) 12 - 45 U/L    ALT(SGPT) 43 2 - 50 U/L    Alkaline Phosphatase 138 (H) 30 - 99 U/L    Total Bilirubin 0.6 0.1 - 1.5 mg/dL    Albumin 4.0 3.2 - 4.9 g/dL    Total Protein 7.0 6.0 - 8.2 g/dL    Globulin 3.0 1.9 - 3.5 g/dL    A-G Ratio 1.3 g/dL   PROTHROMBIN TIME   Result Value Ref Range    PT 12.0 12.0 - 14.6 sec    INR 0.86 (L) 0.87 - 1.13   APTT   Result Value Ref Range    APTT 22.2 (L) 24.7 - 36.0 sec   DIAGNOSTIC  ALCOHOL   Result Value Ref Range    Diagnostic Alcohol 0.00 0.00 g/dL   ESTIMATED GFR   Result Value Ref Range    GFR If African American >60 >60 mL/min/1.73 m 2    GFR If Non African American >60 >60 mL/min/1.73 m 2   ISTAT ARTERIAL BLOOD GAS   Result Value Ref Range    Ph 7.387 (L) 7.400 - 7.500    Pco2 45.7 (H) 26.0 - 37.0 mmHg    Po2 101 (H) 64 - 87 mmHg    Tco2 29 20 - 33 mmol/L    S02 98 93 - 99 %    Hco3 27.4 (H) 17.0 - 25.0 mmol/L    BE 2 -4 - 3 mmol/L    Body Temp see below degrees    O2 Therapy 80 %    Specimen Arterial       COURSE & MEDICAL DECISION MAKING  Pertinent Labs & Imaging studies reviewed. (See chart for details)  We discontinued the Versed drip and started the patient on a propofol infusion. I loaded him with IV Keppra. Emergent consultation with critical care medicine as well as internal medicine was obtained. I reviewed the case with Dr. Amy Beltran with neurology. I have requested a continuous EEG monitoring. She would refer not to do this at this time and wait till his sedation wears off and if he continues to be obtunded during his trial of extubation they will perform an EEG at that point. I think that this is reasonable. I reviewed the case with Dr. Clark as well. The patient will be admitted to the intensive care unit in guarded condition    CRITICAL CARE TIME:    The patient required approximately 35 minutes worth of critical care time. This excludes any procedures. This includes time spent directly at caring for the patient, making critical medical decisions, involving consultants and speaking with the family.    FINAL IMPRESSION  1. Status epilepticus with acute respiratory failure    Admission ICU      Electronically signed by: Miles Dawson, 9/9/2017 7:07 AM

## 2017-09-09 NOTE — ED NOTES
Unable to complete Med Rec, phone number is [facility] is disconnected, no emergency contact and only pharmacy open in [place] does not fill for this patient

## 2017-09-10 ENCOUNTER — APPOINTMENT (OUTPATIENT)
Dept: RADIOLOGY | Facility: MEDICAL CENTER | Age: 62
DRG: 987 | End: 2017-09-10
Attending: INTERNAL MEDICINE
Payer: MEDICARE

## 2017-09-10 LAB
ALBUMIN SERPL BCP-MCNC: 3.5 G/DL (ref 3.2–4.9)
ALBUMIN/GLOB SERPL: 1.3 G/DL
ALP SERPL-CCNC: 119 U/L (ref 30–99)
ALT SERPL-CCNC: 29 U/L (ref 2–50)
ANION GAP SERPL CALC-SCNC: 8 MMOL/L (ref 0–11.9)
AST SERPL-CCNC: 41 U/L (ref 12–45)
BASE EXCESS BLDA CALC-SCNC: 4 MMOL/L (ref -4–3)
BASOPHILS # BLD AUTO: 0.6 % (ref 0–1.8)
BASOPHILS # BLD: 0.05 K/UL (ref 0–0.12)
BILIRUB SERPL-MCNC: 1 MG/DL (ref 0.1–1.5)
BODY TEMPERATURE: ABNORMAL DEGREES
BUN SERPL-MCNC: 7 MG/DL (ref 8–22)
CALCIUM SERPL-MCNC: 8.8 MG/DL (ref 8.5–10.5)
CHLORIDE SERPL-SCNC: 103 MMOL/L (ref 96–112)
CO2 BLDA-SCNC: 30 MMOL/L (ref 20–33)
CO2 SERPL-SCNC: 26 MMOL/L (ref 20–33)
CREAT SERPL-MCNC: 0.74 MG/DL (ref 0.5–1.4)
EOSINOPHIL # BLD AUTO: 0.07 K/UL (ref 0–0.51)
EOSINOPHIL NFR BLD: 0.8 % (ref 0–6.9)
ERYTHROCYTE [DISTWIDTH] IN BLOOD BY AUTOMATED COUNT: 55.7 FL (ref 35.9–50)
GFR SERPL CREATININE-BSD FRML MDRD: >60 ML/MIN/1.73 M 2
GLOBULIN SER CALC-MCNC: 2.7 G/DL (ref 1.9–3.5)
GLUCOSE SERPL-MCNC: 112 MG/DL (ref 65–99)
HCO3 BLDA-SCNC: 28.5 MMOL/L (ref 17–25)
HCT VFR BLD AUTO: 37.3 % (ref 42–52)
HGB BLD-MCNC: 12.5 G/DL (ref 14–18)
IMM GRANULOCYTES # BLD AUTO: 0.03 K/UL (ref 0–0.11)
IMM GRANULOCYTES NFR BLD AUTO: 0.3 % (ref 0–0.9)
LYMPHOCYTES # BLD AUTO: 1 K/UL (ref 1–4.8)
LYMPHOCYTES NFR BLD: 11 % (ref 22–41)
MAGNESIUM SERPL-MCNC: 2.6 MG/DL (ref 1.5–2.5)
MCH RBC QN AUTO: 32.6 PG (ref 27–33)
MCHC RBC AUTO-ENTMCNC: 33.5 G/DL (ref 33.7–35.3)
MCV RBC AUTO: 97.1 FL (ref 81.4–97.8)
MONOCYTES # BLD AUTO: 0.34 K/UL (ref 0–0.85)
MONOCYTES NFR BLD AUTO: 3.7 % (ref 0–13.4)
NEUTROPHILS # BLD AUTO: 7.59 K/UL (ref 1.82–7.42)
NEUTROPHILS NFR BLD: 83.6 % (ref 44–72)
NRBC # BLD AUTO: 0 K/UL
NRBC BLD AUTO-RTO: 0 /100 WBC
O2/TOTAL GAS SETTING VFR VENT: 40 %
PCO2 BLDA: 39.3 MMHG (ref 26–37)
PCO2 TEMP ADJ BLDA: 39.5 MMHG (ref 26–37)
PH BLDA: 7.47 [PH] (ref 7.4–7.5)
PH TEMP ADJ BLDA: 7.47 [PH] (ref 7.4–7.5)
PHOSPHATE SERPL-MCNC: 3.8 MG/DL (ref 2.5–4.5)
PLATELET # BLD AUTO: 276 K/UL (ref 164–446)
PMV BLD AUTO: 7.9 FL (ref 9–12.9)
PO2 BLDA: 96 MMHG (ref 64–87)
PO2 TEMP ADJ BLDA: 97 MMHG (ref 64–87)
POTASSIUM SERPL-SCNC: 3.6 MMOL/L (ref 3.6–5.5)
PROT SERPL-MCNC: 6.2 G/DL (ref 6–8.2)
RBC # BLD AUTO: 3.84 M/UL (ref 4.7–6.1)
SAO2 % BLDA: 98 % (ref 93–99)
SODIUM SERPL-SCNC: 137 MMOL/L (ref 135–145)
SPECIMEN DRAWN FROM PATIENT: ABNORMAL
TSH SERPL DL<=0.005 MIU/L-ACNC: 2.81 UIU/ML (ref 0.3–3.7)
WBC # BLD AUTO: 9.1 K/UL (ref 4.8–10.8)

## 2017-09-10 PROCEDURE — 84100 ASSAY OF PHOSPHORUS: CPT

## 2017-09-10 PROCEDURE — 700111 HCHG RX REV CODE 636 W/ 250 OVERRIDE (IP): Performed by: INTERNAL MEDICINE

## 2017-09-10 PROCEDURE — 84443 ASSAY THYROID STIM HORMONE: CPT

## 2017-09-10 PROCEDURE — 82803 BLOOD GASES ANY COMBINATION: CPT

## 2017-09-10 PROCEDURE — A9270 NON-COVERED ITEM OR SERVICE: HCPCS | Performed by: INTERNAL MEDICINE

## 2017-09-10 PROCEDURE — 700111 HCHG RX REV CODE 636 W/ 250 OVERRIDE (IP): Performed by: EMERGENCY MEDICINE

## 2017-09-10 PROCEDURE — 71010 DX-CHEST-PORTABLE (1 VIEW): CPT

## 2017-09-10 PROCEDURE — 700102 HCHG RX REV CODE 250 W/ 637 OVERRIDE(OP): Performed by: INTERNAL MEDICINE

## 2017-09-10 PROCEDURE — 700105 HCHG RX REV CODE 258: Performed by: EMERGENCY MEDICINE

## 2017-09-10 PROCEDURE — 83735 ASSAY OF MAGNESIUM: CPT

## 2017-09-10 PROCEDURE — 700105 HCHG RX REV CODE 258: Performed by: INTERNAL MEDICINE

## 2017-09-10 PROCEDURE — 85025 COMPLETE CBC W/AUTO DIFF WBC: CPT

## 2017-09-10 PROCEDURE — 700111 HCHG RX REV CODE 636 W/ 250 OVERRIDE (IP)

## 2017-09-10 PROCEDURE — 36600 WITHDRAWAL OF ARTERIAL BLOOD: CPT

## 2017-09-10 PROCEDURE — 94003 VENT MGMT INPAT SUBQ DAY: CPT

## 2017-09-10 PROCEDURE — 80053 COMPREHEN METABOLIC PANEL: CPT

## 2017-09-10 PROCEDURE — 770022 HCHG ROOM/CARE - ICU (200)

## 2017-09-10 RX ORDER — FUROSEMIDE 10 MG/ML
40 INJECTION INTRAMUSCULAR; INTRAVENOUS ONCE
Status: COMPLETED | OUTPATIENT
Start: 2017-09-10 | End: 2017-09-10

## 2017-09-10 RX ADMIN — STANDARDIZED SENNA CONCENTRATE AND DOCUSATE SODIUM 2 TABLET: 8.6; 5 TABLET, FILM COATED ORAL at 08:36

## 2017-09-10 RX ADMIN — PHENYTOIN SODIUM 100 MG: 50 INJECTION INTRAMUSCULAR; INTRAVENOUS at 04:52

## 2017-09-10 RX ADMIN — DEXTROSE MONOHYDRATE 500 MG: 50 INJECTION, SOLUTION INTRAVENOUS at 08:38

## 2017-09-10 RX ADMIN — PROPOFOL 50 MCG/KG/MIN: 10 INJECTION, EMULSION INTRAVENOUS at 04:51

## 2017-09-10 RX ADMIN — AMPICILLIN SODIUM AND SULBACTAM SODIUM 3 G: 2; 1 INJECTION, POWDER, FOR SOLUTION INTRAMUSCULAR; INTRAVENOUS at 23:18

## 2017-09-10 RX ADMIN — PHENYTOIN SODIUM 100 MG: 50 INJECTION INTRAMUSCULAR; INTRAVENOUS at 14:11

## 2017-09-10 RX ADMIN — AMPICILLIN SODIUM AND SULBACTAM SODIUM 3 G: 2; 1 INJECTION, POWDER, FOR SOLUTION INTRAMUSCULAR; INTRAVENOUS at 11:30

## 2017-09-10 RX ADMIN — FUROSEMIDE 40 MG: 10 INJECTION, SOLUTION INTRAMUSCULAR; INTRAVENOUS at 17:30

## 2017-09-10 RX ADMIN — CHLORHEXIDINE GLUCONATE 15 ML: 1.2 RINSE ORAL at 20:08

## 2017-09-10 RX ADMIN — PHENYTOIN SODIUM 100 MG: 50 INJECTION INTRAMUSCULAR; INTRAVENOUS at 20:36

## 2017-09-10 RX ADMIN — CHLORHEXIDINE GLUCONATE 15 ML: 1.2 RINSE ORAL at 08:36

## 2017-09-10 RX ADMIN — DEXTROSE MONOHYDRATE 500 MG: 50 INJECTION, SOLUTION INTRAVENOUS at 20:08

## 2017-09-10 RX ADMIN — STANDARDIZED SENNA CONCENTRATE AND DOCUSATE SODIUM 2 TABLET: 8.6; 5 TABLET, FILM COATED ORAL at 20:08

## 2017-09-10 RX ADMIN — PROPOFOL 40 MCG/KG/MIN: 10 INJECTION, EMULSION INTRAVENOUS at 20:08

## 2017-09-10 RX ADMIN — PROPOFOL 40 MCG/KG/MIN: 10 INJECTION, EMULSION INTRAVENOUS at 18:03

## 2017-09-10 RX ADMIN — PROPOFOL 50 MCG/KG/MIN: 10 INJECTION, EMULSION INTRAVENOUS at 01:05

## 2017-09-10 RX ADMIN — SODIUM CHLORIDE: 9 INJECTION, SOLUTION INTRAVENOUS at 17:19

## 2017-09-10 RX ADMIN — FOLIC ACID 1 MG: 1 TABLET ORAL at 08:36

## 2017-09-10 RX ADMIN — AMPICILLIN SODIUM AND SULBACTAM SODIUM 3 G: 2; 1 INJECTION, POWDER, FOR SOLUTION INTRAMUSCULAR; INTRAVENOUS at 17:19

## 2017-09-10 RX ADMIN — ENOXAPARIN SODIUM 40 MG: 100 INJECTION SUBCUTANEOUS at 08:36

## 2017-09-10 RX ADMIN — AMPICILLIN SODIUM AND SULBACTAM SODIUM 3 G: 2; 1 INJECTION, POWDER, FOR SOLUTION INTRAMUSCULAR; INTRAVENOUS at 04:52

## 2017-09-10 RX ADMIN — Medication 100 MG: at 08:36

## 2017-09-10 RX ADMIN — PROPOFOL 30 MCG/KG/MIN: 10 INJECTION, EMULSION INTRAVENOUS at 12:14

## 2017-09-10 RX ADMIN — THERA TABS 1 TABLET: TAB at 08:36

## 2017-09-10 RX ADMIN — PROPOFOL 50 MCG/KG/MIN: 10 INJECTION, EMULSION INTRAVENOUS at 08:35

## 2017-09-10 ASSESSMENT — LIFESTYLE VARIABLES: REASON UNABLE TO ASSESS: SEDATED/INTUBATED

## 2017-09-10 NOTE — PROGRESS NOTES
"Patients family at bedside, patients sister states \"Patient drinks up to 3-4x/day when he has \"money'. He also may be using meth or crack but I'm unsure. He may or may not be compliant with his home medications due to partying.\"   "

## 2017-09-10 NOTE — PROGRESS NOTES
Pulmonary Critical Care Progress Note        Date of Service:  9/10/2017    History of Present Illness: 62 y.o. male admitted on 9/9/2017 for status epilepticus requiring intubation and propofol drip     ROS:  Unable to obtain as the patient is sedated and on the ventilator    Interval Events:  24 hour interval history reviewed    - No further seizure activity   - Not following commands while on propofol   - Tube feeds started and at goal   - Vent day #2   - CXR(personally reviewed imaging and report): improved bilateral basilar infiltrates   - Doing on SBT   - Respiratory cultures are negative so far.    PFSH:  No change.    Respiratory:  Manuel Vent Mode: APVCMV, Rate (breaths/min): 16, Vt Target (mL): 440, PEEP/CPAP: 8, FiO2: 40, Static Compliance (ml / cm H2O): 57, Control VTE (exp VT): 430  Pulse Oximetry: 96 %    Exam: clear to auscultation without rales or wheezes  ImagingAvailable data reviewed   Recent Labs      09/09/17   0718  09/10/17   0426   ISTATAPH  7.387*  7.468   ISTATAPCO2  45.7*  39.3*   ISTATAPO2  101*  96*   ISTATATCO2  29  30   [id number]  98  98   ISTATARTHCO3  27.4*  28.5*   ISTATARTBE  2  4*   ISTATTEMP  see below  98.8 F   ISTATFIO2  80  40   ISTATSPEC  Arterial  Arterial   ISTATAPHTC   --   7.466   [id number]   --   97*       HemoDynamics:  Pulse: 90, Heart Rate (Monitored): 89  Blood Pressure: 125/84, NIBP: 117/73       Exam: regular rate and rhythm  Imaging: Available data reviewed        Neuro:  GCS Total Rosales Coma Score: 10       Exam: Heavily sedated  Imaging: Available data reviewed    Fluids:  Intake/Output       09/08/17 0700 - 09/09/17 0659 (Not Admitted) 09/09/17 0700 - 09/10/17 0659 09/10/17 0700 - 09/11/17 0659      6004-4897 2236-7202 Total 9333-5317 7219-5294 Total 5533-4099 0006-6092 Total       Intake    I.V.  --  -- --  278.1  59.4 337.5  --  -- --    Propofol Volume -- -- -- 278.1 59.4 337.5 -- -- --    Enteral  --  -- --  --  410 410  --  -- --    Enteral Volume  -- -- -- -- 290 290 -- -- --    Free Water / Tube Flush -- -- -- -- 60 60 -- -- --    PROTEIN -- -- -- -- 60 60 -- -- --    Total Intake -- -- -- 278.1 469.4 747.5 -- -- --       Output    Urine  --  -- --  2651845 4495  --  -- --    Indwelling Cathether -- -- -- 2651845 449 -- -- --    Total Output -- -- -- 2651845 -- -- --       Net I/O     -- -- -- -2372 -1375.6 -3747.6 -- -- --        Weight: 100.7 kg (222 lb 0.1 oz)  Recent Labs      09/09/17   0700  09/10/17   0400   SODIUM  127*  137   POTASSIUM  4.1  3.6   CHLORIDE  93*  103   CO2  25  26   BUN  5*  7*   CREATININE  0.78  0.74   MAGNESIUM   --   2.6*   PHOSPHORUS   --   3.8   CALCIUM  8.6  8.8       GI/Nutrition:  Exam: abdomen is soft and non-tender, normal active bowel sounds  Imaging: Available data reviewed  tube feed Tolerated  Liver Function  Recent Labs      09/09/17   0700  09/10/17   0400   ALTSGPT  43  29   ASTSGOT  48*  41   ALKPHOSPHAT  138*  119*   TBILIRUBIN  0.6  1.0   GLUCOSE  135*  112*       Heme:  Recent Labs      09/09/17   0700  09/10/17   0400   RBC  3.86*  3.84*   HEMOGLOBIN  13.0*  12.5*   HEMATOCRIT  37.0*  37.3*   PLATELETCT  299  276   PROTHROMBTM  12.0   --    APTT  22.2*   --    INR  0.86*   --        Infectious Disease:  Monitored Temp  Av.6 °C (99.7 °F)  Min: 37.1 °C (98.8 °F)  Max: 37.9 °C (100.2 °F)  Temp  Av °C (98.6 °F)  Min: 36.6 °C (97.8 °F)  Max: 37.4 °C (99.3 °F)  Micro: antibiotics reviewed and cultures reviewed  Recent Labs      09/09/17   0700  09/10/17   0400   WBC  10.3  9.1   NEUTSPOLYS  92.20*  83.60*   LYMPHOCYTES  2.80*  11.00*   MONOCYTES  3.80  3.70   EOSINOPHILS  0.00  0.80   BASOPHILS  0.50  0.60   ASTSGOT  48*  41   ALTSGPT  43  29   ALKPHOSPHAT  138*  119*   TBILIRUBIN  0.6  1.0     Current Facility-Administered Medications   Medication Dose Frequency Provider Last Rate Last Dose   • levetiracetam (KEPPRA) 500 mg in D5W 100 mL IVPB  500 mg Q12HRS Miles Dawson M.D.   Stopped at  09/09/17 2004   • senna-docusate (PERICOLACE or SENOKOT S) 8.6-50 MG per tablet 2 Tab  2 Tab BID Mitch James M.D.   2 Tab at 09/09/17 1948    And   • polyethylene glycol/lytes (MIRALAX) PACKET 1 Packet  1 Packet QDAY PRN Mitch James M.D.        And   • magnesium hydroxide (MILK OF MAGNESIA) suspension 30 mL  30 mL QDAY PRN Mitch James M.D.        And   • bisacodyl (DULCOLAX) suppository 10 mg  10 mg QDAY PRN Mitch James M.D.       • Respiratory Care per Protocol   Continuous RT Mitch James M.D.       • NS infusion   Continuous Mitch James M.D. 83 mL/hr at 09/09/17 2211     • labetalol (NORMODYNE,TRANDATE) injection 10 mg  10 mg Q4HRS PRN Mitch James M.D.       • glucose 4 g chewable tablet 16 g  16 g Q15 MIN PRN Mitch James M.D.        And   • dextrose 50% (D50W) injection 25 mL  25 mL Q15 MIN PRN Mitch James M.D.       • lorazepam (ATIVAN) injection 4 mg  4 mg Q10 MIN PRN Mitch James M.D.       • thiamine (THIAMINE) tablet 100 mg  100 mg DAILY Mitch James M.D.        And   • folic acid (FOLVITE) tablet 1 mg  1 mg DAILY Mitch James M.D.        And   • multivitamin (THERAGRAN) tablet 1 Tab  1 Tab DAILY Mitch James M.D.       • ampicillin/sulbactam (UNASYN) 3 g in  mL IVPB  3 g Q6HRS Mitch James M.D.   Stopped at 09/10/17 0522   • enoxaparin (LOVENOX) inj 40 mg  40 mg DAILY Mitch James M.D.   40 mg at 09/09/17 1149   • phenytoin (DILANTIN) injection 100 mg  100 mg Q8HRS Mitch James M.D.   100 mg at 09/10/17 0452   • chlorhexidine (PERIDEX) 0.12 % solution 15 mL  15 mL BID Jessica Clark M.D.   15 mL at 09/09/17 1948   • lidocaine (XYLOCAINE) 1%  injection  1-2 mL Q30 MIN PRN Jessica Clark M.D.       • MD ALERT...Adult ICU Electrolyte Replacement per Pharmacy Protocol   pharmacy to dose Jessica Clark M.D.       • propofol (DIPRIVAN) injection  0-80 mcg/kg/min Continuous Twyla MathewD 29.7 mL/hr at 09/10/17 0451 50 mcg/kg/min at 09/10/17 0451     Last reviewed on 9/9/2017   7:50 AM by Blessing Guardado [facility]    Quality  Measures:  Medications reviewed, Labs reviewed, Radiology images reviewed and EKG reviewed  Benson catheter: Critically Ill - Requiring Accurate Measurement of Urinary Output and Unconscious / Sedated Patient on a Ventilator      DVT Prophylaxis: Enoxaparin (Lovenox)  DVT prophylaxis - mechanical: SCDs  Ulcer prophylaxis: Yes  Antibiotics: Treating active infection/contamination beyond 24 hours perioperative coverage      Problems/Plan:  Acute Hypoxic Respiratory Failure   - due to status epilepticus and need for airway protection   - intubated on 9/9   - cont full ventilator support   - cont RT/O2 protocols   - start to wean sedation    - start SBTs today  Status Epilepticus   - cont propofol drip and wean   - s/p dilantin load, level only at 9.2   - s/p keppra load and continue   - neuro following   - CT head negative for new pathology  Aspiration Pneumonia   - cont Unasyn   - follow blood and respiratory cultures   - cont to monitor for sepsis/shock  Hx of TBI with residual seizure activity   - cont dilantin   - CT head reviewed and no new pathology  Hx of Alcohol Abuse   - monitor for withdrawal   - cont propofol   - continue MVI/folate/thiamine  Hyponatremia   - improved  Prophylaxis   - pepcid, enteral feedings, bowel protocol   - enoxaparin    Discussed patient condition and risk of morbidity and/or mortality with RN, RT, Pharmacy, , [facility] resident and neurology.    The patient remains critically ill.  Critical care time = 35 minutes in directly providing and coordinating critical care and extensive data review.  No time overlap and excludes procedures.

## 2017-09-10 NOTE — PROGRESS NOTES
Internal Medicine Interval Note    Name Crescencio Eller     1955   Age/Sex 62 y.o. male   MRN [id number]   Code Status full     After 5PM or if no immediate response to page, please call for cross-coverage  Attending/Team: Dr Anaya/RENATO Bermeo See Patient List for primary contact information  Call [phone] to page    1st Call - Day Intern (R1):   Kendra 2nd Call - Day Sr. Resident (R2/R3):   Kush Connolly         Reason for interval visit  (Principal Problem)   <principal problem not specified>  1.  Status epilepticus.  2.  Alcohol withdrawal.  3.  Aspiration pneumonia.    Interval Problem Daily Status Update  (24 hours)   9/10 pt currently stable, he has one episode of vomiting last night, held tube feeding, no more seizure, bronch done yesterday, doing SBT, initial Bcx neg, pending tracheal aspirate Cx    ROS   Unable to obtain due to the patient being intubated.    Consultants/Specialty  PMA    Disposition  ICU    Quality Measures    Reviewed items::  EKG reviewed, Labs reviewed, Medications reviewed and Radiology images reviewed  Benson catheter::  Critically Ill - Requiring Accurate Measurement of Urinary Output  DVT prophylaxis pharmacological::  Enoxaparin (Lovenox)          Physical Exam       Vitals:    09/10/17 0600 09/10/17 0625 09/10/17 0700 09/10/17 0800   BP:       Pulse: 96  99 90   Resp: (!) 25  (!) 35 19   Temp:       SpO2: 94% 100% 98% 98%   Weight:       Height:         Body mass index is 31.85 kg/m². Weight: 100.7 kg (222 lb 0.1 oz)  Oxygen Therapy:  Pulse Oximetry: 98 %, FIO2%: 50, O2 Delivery: Ventilator    Physical Exam  CONSTITUTIONAL:  The patient is sedated.  HEENT:  Endotracheal tube in place.  Pupils equally reactive to light and   accommodation.  PULMONARY:  Bilateral basal crackles.  CARDIOVASCULAR:  Regular rate and rhythm without murmurs, rubs or gallops.  ABDOMEN:  Soft, nontender, bowel sounds present.  EXTREMITIES:  No edema, cyanosis or clubbing.  NEUROLOGIC:  The patient  is sedated; however, appears to be moving left-sided   extremities and is only withdrawing right-sided extremities to painful   stimuli.    Lab Data Review:         9/10/2017  8:25 AM    Recent Labs      09/09/17   0700  09/10/17   0400   SODIUM  127*  137   POTASSIUM  4.1  3.6   CHLORIDE  93*  103   CO2  25  26   BUN  5*  7*   CREATININE  0.78  0.74   MAGNESIUM   --   2.6*   PHOSPHORUS   --   3.8   CALCIUM  8.6  8.8       Recent Labs      09/09/17   0700  09/10/17   0400   ALTSGPT  43  29   ASTSGOT  48*  41   ALKPHOSPHAT  138*  119*   TBILIRUBIN  0.6  1.0   GLUCOSE  135*  112*       Recent Labs      09/09/17   0700  09/10/17   0400   RBC  3.86*  3.84*   HEMOGLOBIN  13.0*  12.5*   HEMATOCRIT  37.0*  37.3*   PLATELETCT  299  276   PROTHROMBTM  12.0   --    APTT  22.2*   --    INR  0.86*   --        Recent Labs      09/09/17   0700  09/10/17   0400   WBC  10.3  9.1   NEUTSPOLYS  92.20*  83.60*   LYMPHOCYTES  2.80*  11.00*   MONOCYTES  3.80  3.70   EOSINOPHILS  0.00  0.80   BASOPHILS  0.50  0.60   ASTSGOT  48*  41   ALTSGPT  43  29   ALKPHOSPHAT  138*  119*   TBILIRUBIN  0.6  1.0           Assessment/Plan     No new Assessment & Plan notes have been filed under this hospital service since the last note was generated.  Service: Hospital Medicine    ASSESSMENT AND PLAN:  This is a 62-year-old  gentleman who presented with status epilepticus.  This is most likely alcohol withdrawal related as evidenced by  history of alcohol abuse vs encsephlaopthy.  He also has history of noncompliance of medications.  He is intubated for airway protection as he was mentally altered. He may have aspiration pneumonia as well.    1.  Status epilepticus.  2.  Alcohol withdrawal.  3.  Aspiration pneumonia.  4.  Traumatic brain injury.  5.  Noncompliance with medication.  6.  History of right-sided hemiparesis.  7.  ?Hypertension.     PLAN:  -cont  Keppra 500 mg b.i.d. & phenytoin 100 mg t.i.d. in IV form.  - Unasyn 3 g q. 6  hours for aspiration pneumonia.  - pending tracheal aspirate Cx  - Neuro onboard- considering MRI and delaying EEG at this point  - RT& vet support  - SBT

## 2017-09-10 NOTE — CARE PLAN
Problem: Respiratory:  Goal: Respiratory status will improve  Outcome: PROGRESSING AS EXPECTED  Suctioning q2hr, with oral care and prn.     Problem: Infection  Goal: Will remain free from infection  Outcome: PROGRESSING AS EXPECTED  ABX in use.

## 2017-09-10 NOTE — PROGRESS NOTES
Neurology Progress Note        Subjective:  Crescencio is a 62 year old man who has a history of TBI and alcohol abuse who presented with status epilepticus.  He has had no further seizure activity overnight.  He remains intubated and sedated.    Objective:  Vitals:  Vitals:    09/10/17 0700 09/10/17 0800 09/10/17 0900 09/10/17 1006   BP:       Pulse: 99 90 89    Resp: (!) 35 19 19    Temp:       SpO2: 98% 98% 96% 97%   Weight:       Height:         General:  Intubated and sedated.  Mental Status:  Opens eyes to voice, follows simple commands with left upper extremity.  Cranial Nerves:  PERRL, EOMI with no nystagmus, face is symmetric.  Motor: Moves all 4 limbs, but brisker movement on the left side.  Reflexes:  toes downgoing bilaterally  Coordination:  deferred  Gait:  Deferred    Recent Labs      09/09/17   0700  09/10/17   0400   WBC  10.3  9.1   RBC  3.86*  3.84*   HEMOGLOBIN  13.0*  12.5*   HEMATOCRIT  37.0*  37.3*   MCV  95.9  97.1   MCH  33.7*  32.6   RDW  52.6*  55.7*   PLATELETCT  299  276   MPV  7.9*  7.9*   NEUTSPOLYS  92.20*  83.60*   LYMPHOCYTES  2.80*  11.00*   MONOCYTES  3.80  3.70   EOSINOPHILS  0.00  0.80   BASOPHILS  0.50  0.60     Recent Labs      09/09/17   0700  09/10/17   0400   SODIUM  127*  137   POTASSIUM  4.1  3.6   CHLORIDE  93*  103   CO2  25  26   GLUCOSE  135*  112*   BUN  5*  7*         Impression: Crescencio is a  62 y.o male with history of TBI in 2010 and seizure d/o who presents in status epilepticus. He remains intubated and sedated, he attends to voice and moves his left side well. He does not move his right side as much, but this might be from his TBI as he did have a right hemiparesis.      Recommendations:  1.  Continue phenytoin and keppra  2.  Attempt to wean propofol as tolerated.  Monitor for signs of recurrent seizure activity.  3.  Will continue to follow.

## 2017-09-10 NOTE — CARE PLAN
Problem: Ventilation Defect:  Goal: Ability to achieve and maintain unassisted ventilation or tolerate decreased levels of ventilator support  Outcome: PROGRESSING SLOWER THAN EXPECTED  Adult Ventilation Update    Total Vent Days: 2    Patient Lines/Drains/Airways Status    Active Airway     Name: Placement date: Placement time: Site: Days:    Airway Group ET Tube Oral 8.0 09/09/17      Oral   2              Plateau Pressure (Q Shift): 18 (09/09/17 1812)  Static Compliance (ml / cm H2O): 57 (09/10/17 0440)    Sputum/Suction   Cough: Productive (09/10/17 0400)  Sputum Amount: Moderate (09/10/17 0400)  Sputum Color:  (cream) (09/10/17 0400)  Sputum Consistency: Thick (09/10/17 0400)        Events/Summary/Plan: ABG drawn. Lot of secretions and condensation present.

## 2017-09-11 ENCOUNTER — APPOINTMENT (OUTPATIENT)
Dept: RADIOLOGY | Facility: MEDICAL CENTER | Age: 62
DRG: 987 | End: 2017-09-11
Attending: INTERNAL MEDICINE
Payer: MEDICARE

## 2017-09-11 LAB
ALBUMIN SERPL BCP-MCNC: 3.3 G/DL (ref 3.2–4.9)
ALBUMIN/GLOB SERPL: 1.1 G/DL
ALP SERPL-CCNC: 121 U/L (ref 30–99)
ALT SERPL-CCNC: 20 U/L (ref 2–50)
ANION GAP SERPL CALC-SCNC: 10 MMOL/L (ref 0–11.9)
AST SERPL-CCNC: 32 U/L (ref 12–45)
BACTERIA SPEC RESP CULT: NORMAL
BASE EXCESS BLDA CALC-SCNC: 5 MMOL/L (ref -4–3)
BASOPHILS # BLD AUTO: 0.3 % (ref 0–1.8)
BASOPHILS # BLD: 0.02 K/UL (ref 0–0.12)
BILIRUB SERPL-MCNC: 0.9 MG/DL (ref 0.1–1.5)
BODY TEMPERATURE: ABNORMAL DEGREES
BUN SERPL-MCNC: 10 MG/DL (ref 8–22)
CALCIUM SERPL-MCNC: 8.9 MG/DL (ref 8.5–10.5)
CHLORIDE SERPL-SCNC: 105 MMOL/L (ref 96–112)
CO2 BLDA-SCNC: 28 MMOL/L (ref 20–33)
CO2 SERPL-SCNC: 25 MMOL/L (ref 20–33)
CREAT SERPL-MCNC: 0.66 MG/DL (ref 0.5–1.4)
CRP SERPL HS-MCNC: 19.16 MG/DL (ref 0–0.75)
EOSINOPHIL # BLD AUTO: 0.08 K/UL (ref 0–0.51)
EOSINOPHIL NFR BLD: 1 % (ref 0–6.9)
ERYTHROCYTE [DISTWIDTH] IN BLOOD BY AUTOMATED COUNT: 57.3 FL (ref 35.9–50)
GFR SERPL CREATININE-BSD FRML MDRD: >60 ML/MIN/1.73 M 2
GLOBULIN SER CALC-MCNC: 2.9 G/DL (ref 1.9–3.5)
GLUCOSE SERPL-MCNC: 111 MG/DL (ref 65–99)
GRAM STN SPEC: NORMAL
GRAM STN SPEC: NORMAL
HCO3 BLDA-SCNC: 27.4 MMOL/L (ref 17–25)
HCT VFR BLD AUTO: 37.9 % (ref 42–52)
HGB BLD-MCNC: 12.7 G/DL (ref 14–18)
IMM GRANULOCYTES # BLD AUTO: 0.03 K/UL (ref 0–0.11)
IMM GRANULOCYTES NFR BLD AUTO: 0.4 % (ref 0–0.9)
LYMPHOCYTES # BLD AUTO: 1.56 K/UL (ref 1–4.8)
LYMPHOCYTES NFR BLD: 19.5 % (ref 22–41)
MAGNESIUM SERPL-MCNC: 2.2 MG/DL (ref 1.5–2.5)
MCH RBC QN AUTO: 32.9 PG (ref 27–33)
MCHC RBC AUTO-ENTMCNC: 33.5 G/DL (ref 33.7–35.3)
MCV RBC AUTO: 98.2 FL (ref 81.4–97.8)
MONOCYTES # BLD AUTO: 0.39 K/UL (ref 0–0.85)
MONOCYTES NFR BLD AUTO: 4.9 % (ref 0–13.4)
NEUTROPHILS # BLD AUTO: 5.91 K/UL (ref 1.82–7.42)
NEUTROPHILS NFR BLD: 73.9 % (ref 44–72)
NRBC # BLD AUTO: 0 K/UL
NRBC BLD AUTO-RTO: 0 /100 WBC
O2/TOTAL GAS SETTING VFR VENT: 30 %
PCO2 BLDA: 31.3 MMHG (ref 26–37)
PCO2 TEMP ADJ BLDA: 32.3 MMHG (ref 26–37)
PH BLDA: 7.55 [PH] (ref 7.4–7.5)
PH TEMP ADJ BLDA: 7.54 [PH] (ref 7.4–7.5)
PHOSPHATE SERPL-MCNC: 3.2 MG/DL (ref 2.5–4.5)
PLATELET # BLD AUTO: 250 K/UL (ref 164–446)
PMV BLD AUTO: 8.7 FL (ref 9–12.9)
PO2 BLDA: 58 MMHG (ref 64–87)
PO2 TEMP ADJ BLDA: 60 MMHG (ref 64–87)
POTASSIUM SERPL-SCNC: 3.6 MMOL/L (ref 3.6–5.5)
PREALB SERPL-MCNC: 13 MG/DL (ref 18–38)
PROCALCITONIN SERPL-MCNC: 0.28 NG/ML
PROT SERPL-MCNC: 6.2 G/DL (ref 6–8.2)
RBC # BLD AUTO: 3.86 M/UL (ref 4.7–6.1)
SAO2 % BLDA: 93 % (ref 93–99)
SIGNIFICANT IND 70042: NORMAL
SIGNIFICANT IND 70042: NORMAL
SITE SITE: NORMAL
SITE SITE: NORMAL
SODIUM SERPL-SCNC: 140 MMOL/L (ref 135–145)
SOURCE SOURCE: NORMAL
SOURCE SOURCE: NORMAL
SPECIMEN DRAWN FROM PATIENT: ABNORMAL
TRIGL SERPL-MCNC: 105 MG/DL (ref 0–149)
WBC # BLD AUTO: 8 K/UL (ref 4.8–10.8)

## 2017-09-11 PROCEDURE — 770022 HCHG ROOM/CARE - ICU (200)

## 2017-09-11 PROCEDURE — 82803 BLOOD GASES ANY COMBINATION: CPT

## 2017-09-11 PROCEDURE — A9270 NON-COVERED ITEM OR SERVICE: HCPCS | Performed by: INTERNAL MEDICINE

## 2017-09-11 PROCEDURE — 87206 SMEAR FLUORESCENT/ACID STAI: CPT

## 2017-09-11 PROCEDURE — 700111 HCHG RX REV CODE 636 W/ 250 OVERRIDE (IP): Performed by: STUDENT IN AN ORGANIZED HEALTH CARE EDUCATION/TRAINING PROGRAM

## 2017-09-11 PROCEDURE — 85025 COMPLETE CBC W/AUTO DIFF WBC: CPT

## 2017-09-11 PROCEDURE — 87015 SPECIMEN INFECT AGNT CONCNTJ: CPT

## 2017-09-11 PROCEDURE — 700102 HCHG RX REV CODE 250 W/ 637 OVERRIDE(OP): Performed by: INTERNAL MEDICINE

## 2017-09-11 PROCEDURE — 700111 HCHG RX REV CODE 636 W/ 250 OVERRIDE (IP): Performed by: EMERGENCY MEDICINE

## 2017-09-11 PROCEDURE — 302978 HCHG BRONCHOSCOPY-DIAGNOSTIC

## 2017-09-11 PROCEDURE — 84478 ASSAY OF TRIGLYCERIDES: CPT

## 2017-09-11 PROCEDURE — 0B9J8ZX DRAINAGE OF LEFT LOWER LUNG LOBE, VIA NATURAL OR ARTIFICIAL OPENING ENDOSCOPIC, DIAGNOSTIC: ICD-10-PCS | Performed by: INTERNAL MEDICINE

## 2017-09-11 PROCEDURE — 700102 HCHG RX REV CODE 250 W/ 637 OVERRIDE(OP): Performed by: PHARMACIST

## 2017-09-11 PROCEDURE — 700111 HCHG RX REV CODE 636 W/ 250 OVERRIDE (IP)

## 2017-09-11 PROCEDURE — 700111 HCHG RX REV CODE 636 W/ 250 OVERRIDE (IP): Performed by: INTERNAL MEDICINE

## 2017-09-11 PROCEDURE — 700101 HCHG RX REV CODE 250: Performed by: INTERNAL MEDICINE

## 2017-09-11 PROCEDURE — 84134 ASSAY OF PREALBUMIN: CPT

## 2017-09-11 PROCEDURE — 87205 SMEAR GRAM STAIN: CPT

## 2017-09-11 PROCEDURE — 83735 ASSAY OF MAGNESIUM: CPT

## 2017-09-11 PROCEDURE — G8988 SELF CARE GOAL STATUS: HCPCS | Mod: CJ

## 2017-09-11 PROCEDURE — 84100 ASSAY OF PHOSPHORUS: CPT

## 2017-09-11 PROCEDURE — A4357 BEDSIDE DRAINAGE BAG: HCPCS | Performed by: INTERNAL MEDICINE

## 2017-09-11 PROCEDURE — 87070 CULTURE OTHR SPECIMN AEROBIC: CPT

## 2017-09-11 PROCEDURE — 97166 OT EVAL MOD COMPLEX 45 MIN: CPT

## 2017-09-11 PROCEDURE — 80053 COMPREHEN METABOLIC PANEL: CPT

## 2017-09-11 PROCEDURE — 94003 VENT MGMT INPAT SUBQ DAY: CPT

## 2017-09-11 PROCEDURE — 700105 HCHG RX REV CODE 258: Performed by: INTERNAL MEDICINE

## 2017-09-11 PROCEDURE — 36600 WITHDRAWAL OF ARTERIAL BLOOD: CPT

## 2017-09-11 PROCEDURE — 700105 HCHG RX REV CODE 258: Performed by: EMERGENCY MEDICINE

## 2017-09-11 PROCEDURE — A9270 NON-COVERED ITEM OR SERVICE: HCPCS | Performed by: PHARMACIST

## 2017-09-11 PROCEDURE — G8987 SELF CARE CURRENT STATUS: HCPCS | Mod: CL

## 2017-09-11 PROCEDURE — 87102 FUNGUS ISOLATION CULTURE: CPT

## 2017-09-11 PROCEDURE — 84145 PROCALCITONIN (PCT): CPT

## 2017-09-11 PROCEDURE — 95951 EEG: CPT | Mod: 52

## 2017-09-11 PROCEDURE — 86140 C-REACTIVE PROTEIN: CPT

## 2017-09-11 PROCEDURE — 87116 MYCOBACTERIA CULTURE: CPT

## 2017-09-11 PROCEDURE — 71010 DX-CHEST-PORTABLE (1 VIEW): CPT

## 2017-09-11 RX ORDER — FUROSEMIDE 10 MG/ML
40 INJECTION INTRAMUSCULAR; INTRAVENOUS ONCE
Status: COMPLETED | OUTPATIENT
Start: 2017-09-11 | End: 2017-09-11

## 2017-09-11 RX ORDER — LEVETIRACETAM 100 MG/ML
500 SOLUTION ORAL EVERY 12 HOURS
Status: DISCONTINUED | OUTPATIENT
Start: 2017-09-11 | End: 2017-09-20 | Stop reason: HOSPADM

## 2017-09-11 RX ORDER — FAMOTIDINE 20 MG/1
20 TABLET, FILM COATED ORAL 2 TIMES DAILY
Status: DISCONTINUED | OUTPATIENT
Start: 2017-09-11 | End: 2017-09-20 | Stop reason: HOSPADM

## 2017-09-11 RX ORDER — POTASSIUM CHLORIDE 20 MEQ/1
40 TABLET, EXTENDED RELEASE ORAL ONCE
Status: COMPLETED | OUTPATIENT
Start: 2017-09-11 | End: 2017-09-11

## 2017-09-11 RX ADMIN — FAMOTIDINE 20 MG: 10 INJECTION, SOLUTION INTRAVENOUS at 10:09

## 2017-09-11 RX ADMIN — PHENYTOIN SODIUM 100 MG: 50 INJECTION INTRAMUSCULAR; INTRAVENOUS at 04:30

## 2017-09-11 RX ADMIN — PHENYTOIN SODIUM 100 MG: 50 INJECTION INTRAMUSCULAR; INTRAVENOUS at 15:21

## 2017-09-11 RX ADMIN — FENTANYL CITRATE 100 MCG: 50 INJECTION, SOLUTION INTRAMUSCULAR; INTRAVENOUS at 21:32

## 2017-09-11 RX ADMIN — THERA TABS 1 TABLET: TAB at 08:26

## 2017-09-11 RX ADMIN — Medication 100 MG: at 08:26

## 2017-09-11 RX ADMIN — LEVETIRACETAM 500 MG: 100 SOLUTION ORAL at 19:59

## 2017-09-11 RX ADMIN — PROPOFOL 30 MCG/KG/MIN: 10 INJECTION, EMULSION INTRAVENOUS at 01:19

## 2017-09-11 RX ADMIN — FENTANYL CITRATE 100 MCG: 50 INJECTION, SOLUTION INTRAMUSCULAR; INTRAVENOUS at 17:49

## 2017-09-11 RX ADMIN — FAMOTIDINE 20 MG: 20 TABLET, FILM COATED ORAL at 20:00

## 2017-09-11 RX ADMIN — SODIUM CHLORIDE: 9 INJECTION, SOLUTION INTRAVENOUS at 04:34

## 2017-09-11 RX ADMIN — ENOXAPARIN SODIUM 40 MG: 100 INJECTION SUBCUTANEOUS at 08:33

## 2017-09-11 RX ADMIN — POTASSIUM CHLORIDE 40 MEQ: 1500 TABLET, EXTENDED RELEASE ORAL at 08:33

## 2017-09-11 RX ADMIN — PROPOFOL 50 MCG/KG/MIN: 10 INJECTION, EMULSION INTRAVENOUS at 05:43

## 2017-09-11 RX ADMIN — PHENYTOIN SODIUM 100 MG: 50 INJECTION INTRAMUSCULAR; INTRAVENOUS at 20:45

## 2017-09-11 RX ADMIN — FENTANYL CITRATE: 50 INJECTION, SOLUTION INTRAMUSCULAR; INTRAVENOUS at 20:00

## 2017-09-11 RX ADMIN — FENTANYL CITRATE 100 MCG: 50 INJECTION, SOLUTION INTRAMUSCULAR; INTRAVENOUS at 12:26

## 2017-09-11 RX ADMIN — FUROSEMIDE 40 MG: 10 INJECTION, SOLUTION INTRAMUSCULAR; INTRAVENOUS at 16:53

## 2017-09-11 RX ADMIN — AMPICILLIN SODIUM AND SULBACTAM SODIUM 3 G: 2; 1 INJECTION, POWDER, FOR SOLUTION INTRAMUSCULAR; INTRAVENOUS at 17:46

## 2017-09-11 RX ADMIN — DEXMEDETOMIDINE HYDROCHLORIDE 0.9 MCG/KG/HR: 100 INJECTION, SOLUTION INTRAVENOUS at 18:16

## 2017-09-11 RX ADMIN — FENTANYL CITRATE 50 MCG: 50 INJECTION, SOLUTION INTRAMUSCULAR; INTRAVENOUS at 11:13

## 2017-09-11 RX ADMIN — STANDARDIZED SENNA CONCENTRATE AND DOCUSATE SODIUM 2 TABLET: 8.6; 5 TABLET, FILM COATED ORAL at 19:59

## 2017-09-11 RX ADMIN — DEXMEDETOMIDINE HYDROCHLORIDE 1.5 MCG/KG/HR: 100 INJECTION, SOLUTION INTRAVENOUS at 22:05

## 2017-09-11 RX ADMIN — PROPOFOL 50 MCG/KG/MIN: 10 INJECTION, EMULSION INTRAVENOUS at 08:57

## 2017-09-11 RX ADMIN — STANDARDIZED SENNA CONCENTRATE AND DOCUSATE SODIUM 2 TABLET: 8.6; 5 TABLET, FILM COATED ORAL at 08:34

## 2017-09-11 RX ADMIN — AMPICILLIN SODIUM AND SULBACTAM SODIUM 3 G: 2; 1 INJECTION, POWDER, FOR SOLUTION INTRAMUSCULAR; INTRAVENOUS at 12:28

## 2017-09-11 RX ADMIN — DEXMEDETOMIDINE HYDROCHLORIDE 0.2 MCG/KG/HR: 100 INJECTION, SOLUTION INTRAVENOUS at 14:12

## 2017-09-11 RX ADMIN — DEXMEDETOMIDINE HYDROCHLORIDE 1.2 MCG/KG/HR: 100 INJECTION, SOLUTION INTRAVENOUS at 20:05

## 2017-09-11 RX ADMIN — FOLIC ACID 1 MG: 1 TABLET ORAL at 08:34

## 2017-09-11 RX ADMIN — DEXTROSE MONOHYDRATE 500 MG: 50 INJECTION, SOLUTION INTRAVENOUS at 09:08

## 2017-09-11 RX ADMIN — FENTANYL CITRATE 100 MCG: 50 INJECTION, SOLUTION INTRAMUSCULAR; INTRAVENOUS at 23:11

## 2017-09-11 RX ADMIN — CHLORHEXIDINE GLUCONATE 15 ML: 1.2 RINSE ORAL at 20:00

## 2017-09-11 RX ADMIN — AMPICILLIN SODIUM AND SULBACTAM SODIUM 3 G: 2; 1 INJECTION, POWDER, FOR SOLUTION INTRAMUSCULAR; INTRAVENOUS at 04:31

## 2017-09-11 RX ADMIN — AMPICILLIN SODIUM AND SULBACTAM SODIUM 3 G: 2; 1 INJECTION, POWDER, FOR SOLUTION INTRAMUSCULAR; INTRAVENOUS at 23:09

## 2017-09-11 RX ADMIN — CHLORHEXIDINE GLUCONATE 15 ML: 1.2 RINSE ORAL at 08:34

## 2017-09-11 ASSESSMENT — COGNITIVE AND FUNCTIONAL STATUS - GENERAL
SUGGESTED CMS G CODE MODIFIER DAILY ACTIVITY: CL
EATING MEALS: A LOT
PERSONAL GROOMING: A LOT
DRESSING REGULAR UPPER BODY CLOTHING: A LOT
DRESSING REGULAR LOWER BODY CLOTHING: A LOT
TOILETING: A LOT
HELP NEEDED FOR BATHING: A LOT
DAILY ACTIVITIY SCORE: 12

## 2017-09-11 NOTE — PROGRESS NOTES
Internal Medicine Interval Note    Name Crescencio Eller     1955   Age/Sex 62 y.o. male   MRN [id number]   Code Status full     After 5PM or if no immediate response to page, please call for cross-coverage  Attending/Team: Dr Anaya/RENATO Bermeo See Patient List for primary contact information  Call [phone] to page    1st Call - Day Intern (R1):   Kendra 2nd Call - Day Sr. Resident (R2/R3):   Kush Connolly         Reason for interval visit  (Principal Problem)   <principal problem not specified>  1.  Status epilepticus.  2.  Alcohol withdrawal.  3.  Aspiration pneumonia.    Interval Problem Daily Status Update  (24 hours)   9/10 pt currently stable, he has one episode of vomiting last night, held tube feeding, no more seizure, bronch done yesterday, doing SBT, initial Bcx neg, pending tracheal aspirate Cx   - Bronch today. Continue keppra/phenytoin and unasyn  DC IVF.     Review of Systems   Unable to perform ROS: Mental acuity      Unable to obtain due to the patient being intubated.    Consultants/Specialty  PMA    Disposition  ICU    Quality Measures    Reviewed items::  EKG reviewed, Labs reviewed, Medications reviewed and Radiology images reviewed  Benson catheter::  Critically Ill - Requiring Accurate Measurement of Urinary Output  DVT prophylaxis pharmacological::  Enoxaparin (Lovenox)          Physical Exam       Vitals:    17 1000 17 1018 17 1100 17 1226   BP:       Pulse: 79  67    Resp:       Temp:       SpO2: 95% 97% 99% 98%   Weight:       Height:         Body mass index is 31.85 kg/m².    Oxygen Therapy:  Pulse Oximetry: 98 %, FIO2%: 30, O2 Delivery: Ventilator    Physical Exam   Constitutional: He is oriented to person, place, and time.   The patient is sedated   HENT:   Head: Normocephalic and atraumatic.   Eyes: Pupils are equal, round, and reactive to light. Right eye exhibits no discharge. No scleral icterus.   Neck: No JVD present.   Cardiovascular: Normal  rate.  Exam reveals no friction rub.    No murmur heard.  Pulmonary/Chest: No respiratory distress.   Musculoskeletal: He exhibits no edema or tenderness.   Neurological: He is alert and oriented to person, place, and time.   Skin: Skin is warm and dry.     CONSTITUTIONAL:  The patient is sedated.  HEENT:  Endotracheal tube in place.  Pupils equally reactive to light and   accommodation.  PULMONARY:  Bilateral basal crackles.  CARDIOVASCULAR:  Regular rate and rhythm without murmurs, rubs or gallops.  ABDOMEN:  Soft, nontender, bowel sounds present.  EXTREMITIES:  No edema, cyanosis or clubbing.  NEUROLOGIC:  The patient is sedated; however, appears to be moving left-sided   extremities and is only withdrawing right-sided extremities to painful   stimuli.    Lab Data Review:         9/10/2017  8:25 AM    Recent Labs      09/09/17   0700  09/10/17   0400  09/11/17   0400   SODIUM  127*  137  140   POTASSIUM  4.1  3.6  3.6   CHLORIDE  93*  103  105   CO2  25  26  25   BUN  5*  7*  10   CREATININE  0.78  0.74  0.66   MAGNESIUM   --   2.6*  2.2   PHOSPHORUS   --   3.8  3.2   CALCIUM  8.6  8.8  8.9       Recent Labs      09/09/17   0700  09/10/17   0400  09/11/17   0400   ALTSGPT  43  29  20   ASTSGOT  48*  41  32   ALKPHOSPHAT  138*  119*  121*   TBILIRUBIN  0.6  1.0  0.9   PREALBUMIN   --    --   13.0*   GLUCOSE  135*  112*  111*       Recent Labs      09/09/17   0700  09/10/17   0400  09/11/17   0400   RBC  3.86*  3.84*  3.86*   HEMOGLOBIN  13.0*  12.5*  12.7*   HEMATOCRIT  37.0*  37.3*  37.9*   PLATELETCT  299  276  250   PROTHROMBTM  12.0   --    --    APTT  22.2*   --    --    INR  0.86*   --    --        Recent Labs      09/09/17   0700  09/10/17   0400  09/11/17   0400   WBC  10.3  9.1  8.0   NEUTSPOLYS  92.20*  83.60*  73.90*   LYMPHOCYTES  2.80*  11.00*  19.50*   MONOCYTES  3.80  3.70  4.90   EOSINOPHILS  0.00  0.80  1.00   BASOPHILS  0.50  0.60  0.30   ASTSGOT  48*  41  32   ALTSGPT  43  29  20   ALKPHOSPHAT   138*  119*  121*   TBILIRUBIN  0.6  1.0  0.9           Assessment/Plan     No new Assessment & Plan notes have been filed under this hospital service since the last note was generated.  Service: Hospital Medicine    Problem List  1.  Status epilepticus.  2.  Alcohol withdrawal.  3.  Aspiration pneumonia.  4.  Traumatic brain injury.  5.  Noncompliance with medication.  6.  History of right-sided hemiparesis.  7.  ?Hypertension.    Assessment: 62 year old male who presented with status epilepticus lkely secondary to alcohol withdrawal and history of TBI. He remains intubated for airway protection. Bronch today with worsening secretions. Unaysn for aspiration pneumonia in setting of alcoholism and seizure.     PLAN:  -cont  Keppra 500 mg b.i.d. & phenytoin 100 mg t.i.d. in IV form.  - Unasyn 3 g q. 6 hours for aspiration pneumonia.  - Follow up cultures Cx  - Neuro onboard    > Continue phenytoin/keppra   > Wean propofol as tolerated   - RT& vet support  - SBT  - Bronch today, follow up cxs  - Fentanyl PRN pain control   - DC IVF  - PT/OT  - Pepcid  - Check procalcitonin

## 2017-09-11 NOTE — CARE PLAN
Problem: Nutritional:  Goal: Nutrition support tolerated and meeting greater than 85% of estimated needs  Outcome: PROGRESSING AS EXPECTED  TF at goal of 45 ml/hr while on Propofol.

## 2017-09-11 NOTE — EEG PROGRESS NOTE
VIDEO ELECTROENCEPHALOGRAM REPORT      Referring MD: Dr. Meyer.     DOS:  9/11/2017 (total recording of 27 minutes).     INDICATION:  Crescencio Eller 62 y.o. male presenting with possible status epilepticus.     CURRENT ANTIEPILEPTIC REGIMEN: Levetiracetam and Propofol, the latter was held during the study.     TECHNIQUE: 30 channel video electroencephalogram (EEG) was performed in accordance with the international 10-20 system. The study was reviewed in bipolar and referential montages. The recording examined the patient during wakeful and drowsy/sleep state(s).     DESCRIPTION OF THE RECORD:  During the wakefulness, the background showed a symmetrical 16-20 Hz diffuse beta activity.     During the sleep state, background shows diffuse delta activity.  Symmetrical vertex sharps were seen in the leads over the central regions.      ACTIVATION PROCEDURES:   Intermittent Photic stimulation was performed in a stepwise fashion from 1 to 30 Hz , however it failed to produce any significant background changes.      ICTAL AND/OR INTERICTAL FINDINGS:   No focal or generalized epileptiform activity noted. No regional slowing was seen during this routine study.  No clinical events or seizures were reported or recorded during the study.     EKG: sampling of the EKG recording demonstrated sinus rhythm.       INTERPRETATION:  This is an abnormal video EEG recording in the awake, and sleep state(s), due to excessive fast background activity, which can be seen with the use of sedatives. No seizures were captured. Clinical correlation is recommended.    Note: This EEG does not rule out epilepsy.  If the clinical suspicion remains high for seizures, a prolonged recording to capture clinical or subclinical events may be helpful.        Molina Santos MD  Medical Director, Epilepsy and Neurodiagnostics.   Clinical  of Neurology [facility].   Diplomate in Neurology, Epilepsy, and  Electrodiagnostic Medicine.   Office: [phone]  Fax: [phone]

## 2017-09-11 NOTE — CARE PLAN
Problem: Ventilation Defect:  Goal: Ability to achieve and maintain unassisted ventilation or tolerate decreased levels of ventilator support    Intervention: Manage ventilation therapy by monitoring diagnostic test results  Adult Ventilation Update    Total Vent Days: 3    Patient Lines/Drains/Airways Status    Active Airway     Name: Placement date: Placement time: Site: Days:    Airway Group ET Tube Oral 8.0 09/09/17      Oral   2              In the last 24 hours, the patient tolerated SBT for 3.5 hours on settings of 5/8, 30%.     Plateau Pressure (Q Shift): 15 (09/10/17 1835)  Static Compliance (ml / cm H2O): 48 (09/11/17 0211)    Patient failed trials because of Barriers to Wean:  (pt intermittenly follows commands) (09/10/17 1006)  Barriers to SBT Weaning Trial Stopped due to:: Pt weaned for 1 hour and returned to rest settings per protocol (09/10/17 1006)  Length of Weaning Trial Length of Weaning Trial (Hours): 3.5 (09/10/17 1006)    Cough: Congested;Productive (09/11/17 0211)  Sputum Amount: Small (09/11/17 0211)  Sputum Color: Clear (09/11/17 0211)  Sputum Consistency: Tenacious;Thick (09/11/17 0211)    Mobility Group  Activity Performed: Unable to mobilize (09/10/17 2153)  Pt Calls for Assistance: No (09/10/17 2000)  Reason Not Mobilized: Other (comment) (ETOH withdrawals) (09/10/17 2153)    Events/Summary/Plan: calibrated flow sensor (09/11/17 0110)

## 2017-09-11 NOTE — RESPIRATORY CARE
Adult Ventilation Update    Total Vent Days: 2    Patient Lines/Drains/Airways Status    Active Airway     Name: Placement date: Placement time: Site: Days:    Airway Group ET Tube Oral 8.0 09/09/17      Oral   1              In the last 24 hours, the patient tolerated SBT for 2 hours on settings of 5/8         Plateau Pressure (Q Shift): 17 (09/10/17 0625)  Static Compliance (ml / cm H2O): 85.2 (09/10/17 1427)    Patient failed trials because of Barriers to Wean:  (pt intermittenly follows commands) (09/10/17 1006)  Barriers to SBT Weaning Trial Stopped due to:: Pt weaned for 1 hour and returned to rest settings per protocol (09/10/17 1006)  Length of Weaning Trial Length of Weaning Trial (Hours): 3.5 (09/10/17 1006)      Cough: Productive (09/10/17 1600)  Sputum Amount: Moderate (09/10/17 1600)  Sputum Color:  (cream) (09/10/17 1600)  Sputum Consistency: Thick (09/10/17 1600)    Mobility Group  Activity Performed: Unable to mobilize (D/t seizures) (09/09/17 2000)  Pt Calls for Assistance: No (09/09/17 2000)  Reason Not Mobilized: Other (comment) (Pt resistent) (09/10/17 1400)    Events/Summary/Plan: placed back on CMV after attempting to obtain parameters. Pt will not participate in parameters, shakes his head no when asked to take a deep breath.  Serious secretions   (09/10/17 1740)

## 2017-09-11 NOTE — PROGRESS NOTES
"Pulmonary Critical Care Progress Note        Date of Service:  9/11/2017    History of Present Illness: 62 y.o. male admitted on 9/9/2017 for status epilepticus requiring intubation and propofol drip     ROS:  Unable to obtain as the patient is sedated and on the ventilator    Interval Events:  24 hour interval history reviewed   Tm 100.4  -900cc over last 24hrs, -4.7L since admit  Cxr, per my read, slight incr in R>L atx   Pt pulled on hernandez and now with moderate hematuria  Repeat EEG today  Weaning off propofol as tolerated but gets very agitated   Having increased     Exam:  Blood pressure 125/84, pulse 81, temperature 37.4 °C (99.3 °F), resp. rate (!) 44, height 1.778 m (5' 10\"), weight 100.7 kg (222 lb 0.1 oz), SpO2 97 %.  Gen: nad, sedate  Heent: nc, at  Cvs: rate wnl  Resp: diminished with scattered rhonchi  Abdo: s, nd  Ext: + edema  Neuro: sedate      PFSH:  No change.    Respiratory:  Manuel Vent Mode: APVCMV, Rate (breaths/min): 16, Vt Target (mL): 440, PEEP/CPAP: 8, FiO2: 30, P Support: 5, Static Compliance (ml / cm H2O): 51, Control VTE (exp VT): 449  Pulse Oximetry: 97 %    ImagingAvailable data reviewed   Recent Labs      09/09/17   0718  09/10/17   0426  09/11/17   0435   ISTATAPH  7.387*  7.468  7.550*   ISTATAPCO2  45.7*  39.3*  31.3   ISTATAPO2  101*  96*  58*   ISTATATCO2  29  30  28   [id number]  98  98  93   ISTATARTHCO3  27.4*  28.5*  27.4*   ISTATARTBE  2  4*  5*   ISTATTEMP  see below  98.8 F  37.7 C   ISTATFIO2  80  40  30   ISTATSPEC  Arterial  Arterial  Arterial   ISTATAPHTC   --   7.466  7.539*   [id number]   --   97*  60*       HemoDynamics:  Pulse: 81, Heart Rate (Monitored): 82  NIBP: 107/71         Imaging: Available data reviewed        Neuro:  GCS Total Rosales Coma Score: 11         Imaging: Available data reviewed    Fluids:  Intake/Output       09/09/17 0700 - 09/10/17 0659 09/10/17 0700 - 09/11/17 0659 09/11/17 0700 - 09/12/17 0659      0427-9801 9028-1177 Total 6039-4036 " 8997-6660 Total 3942-1747 7327-9213 Total       Intake    I.V.  278.1  59.4 337.5  1654.6  438.8 2093.4  --  -- --    Propofol Volume 278.1 59.4 337.5 524.6 272.8 797.4 -- -- --    IV Piggyback Volume (IVPB) -- -- -- 300 -- 300 -- -- --    NS -- -- -- 830 166 996 -- -- --    Enteral  --  500 500  660  660 1320  --  -- --    Enteral Volume -- 380 380  -- -- --    Free Water / Tube Flush -- 60 60 120 60 180 -- -- --    PROTEIN -- 60 60 -- 60 60 -- -- --    Total Intake 278.1 559.4 837.5 2314.6 1098.8 3413.4 -- -- --       Output    Urine  2650   4645  1800  2520 4320  --  -- --    Indwelling Cathether 2650  4645 1800 2520 4320 -- -- --    Total Output 2650  4645 1800 2520 4320 -- -- --       Net I/O     -2372 -1435.6 -3807.6 514.6 -1421.2 -906.6 -- -- --           Recent Labs      09/09/17   0700  09/10/17   0400  09/11/17   0400   SODIUM  127*  137  140   POTASSIUM  4.1  3.6  3.6   CHLORIDE  93*  103  105   CO2  25  26  25   BUN  5*  7*  10   CREATININE  0.78  0.74  0.66   MAGNESIUM   --   2.6*  2.2   PHOSPHORUS   --   3.8  3.2   CALCIUM  8.6  8.8  8.9       GI/Nutrition:    Imaging: Available data reviewed  tube feed Tolerated  Liver Function  Recent Labs      09/09/17   0700  09/10/17   0400  09/11/17   0400   ALTSGPT  43  29  20   ASTSGOT  48*  41  32   ALKPHOSPHAT  138*  119*  121*   TBILIRUBIN  0.6  1.0  0.9   PREALBUMIN   --    --   13.0*   GLUCOSE  135*  112*  111*       Heme:  Recent Labs      09/09/17   0700  09/10/17   0400  09/11/17   0400   RBC  3.86*  3.84*  3.86*   HEMOGLOBIN  13.0*  12.5*  12.7*   HEMATOCRIT  37.0*  37.3*  37.9*   PLATELETCT  299  276  250   PROTHROMBTM  12.0   --    --    APTT  22.2*   --    --    INR  0.86*   --    --        Infectious Disease:  Monitored Temp  Av.6 °C (99.6 °F)  Min: 37 °C (98.6 °F)  Max: 38 °C (100.4 °F)  Micro: cultures reviewed  Recent Labs      17   0700  09/10/17   0400  17   0400   WBC  10.3  9.1  8.0   NEUTSPOLYS   92.20*  83.60*  73.90*   LYMPHOCYTES  2.80*  11.00*  19.50*   MONOCYTES  3.80  3.70  4.90   EOSINOPHILS  0.00  0.80  1.00   BASOPHILS  0.50  0.60  0.30   ASTSGOT  48*  41  32   ALTSGPT  43  29  20   ALKPHOSPHAT  138*  119*  121*   TBILIRUBIN  0.6  1.0  0.9     Current Facility-Administered Medications   Medication Dose Frequency Provider Last Rate Last Dose   • potassium chloride SA (Kdur) tablet 40 mEq  40 mEq Once Jessica Nj, TwylaD       • levetiracetam (KEPPRA) 500 mg in D5W 100 mL IVPB  500 mg Q12HRS Miles Dawson M.D.   Stopped at 09/10/17 2023   • senna-docusate (PERICOLACE or SENOKOT S) 8.6-50 MG per tablet 2 Tab  2 Tab BID Mitch James M.D.   2 Tab at 09/10/17 2008    And   • polyethylene glycol/lytes (MIRALAX) PACKET 1 Packet  1 Packet QDAY PRN Mitch James M.D.        And   • magnesium hydroxide (MILK OF MAGNESIA) suspension 30 mL  30 mL QDAY PRN Mitch James M.D.        And   • bisacodyl (DULCOLAX) suppository 10 mg  10 mg QDAY PRN Mitch James M.D.       • Respiratory Care per Protocol   Continuous RT Mitch James M.D.       • NS infusion   Continuous Mitch James M.D. 83 mL/hr at 09/11/17 0434     • labetalol (NORMODYNE,TRANDATE) injection 10 mg  10 mg Q4HRS PRN Mitch James M.D.       • glucose 4 g chewable tablet 16 g  16 g Q15 MIN PRN Mitch James M.D.        And   • dextrose 50% (D50W) injection 25 mL  25 mL Q15 MIN PRN Mitch James M.D.       • lorazepam (ATIVAN) injection 4 mg  4 mg Q10 MIN PRN Mitch James M.D.       • thiamine (THIAMINE) tablet 100 mg  100 mg DAILY Mitch James M.D.   100 mg at 09/10/17 0836    And   • folic acid (FOLVITE) tablet 1 mg  1 mg DAILY Mitch James M.D.   1 mg at 09/10/17 0836    And   • multivitamin (THERAGRAN) tablet 1 Tab  1 Tab DAILY Mitch James M.D.   1 Tab at 09/10/17 0836   • ampicillin/sulbactam (UNASYN) 3 g in  mL IVPB  3 g Q6HRS Mitch James M.D.   Stopped at 09/11/17 0501   • enoxaparin (LOVENOX) inj 40 mg  40 mg DAILY Mitch James M.D.   40  mg at 09/10/17 0836   • phenytoin (DILANTIN) injection 100 mg  100 mg Q8HRS Mitch James M.D.   100 mg at 09/11/17 0430   • chlorhexidine (PERIDEX) 0.12 % solution 15 mL  15 mL BID Jessica Clark M.D.   15 mL at 09/10/17 2008   • lidocaine (XYLOCAINE) 1%  injection  1-2 mL Q30 MIN PRN Jessica Clark M.D.       • MD ALERT...Adult ICU Electrolyte Replacement per Pharmacy Protocol   pharmacy to dose Jessica Clark M.D.       • propofol (DIPRIVAN) injection  0-80 mcg/kg/min Continuous Twyla MathewD 29.7 mL/hr at 09/11/17 0543 50 mcg/kg/min at 09/11/17 0543     Last reviewed on 9/9/2017  7:50 AM by Blessing Guardado, [facility]    Quality  Measures:  Medications reviewed, Labs reviewed and Radiology images reviewed                  Lines  Piv    Gtt  NS 83  Prop 50    Abx  Unasyn    keppra  Phenytoin     bcx 9/9 ngtd  scx 9/9 ngtd    Problems/Plan:  Acute Hypoxic Respiratory Failure   - due to status epilepticus and need for airway protection   - intubated on 9/9   - cont full ventilator support   - cont RT/O2 protocols   - d/c ivf   - start gentle diureses    - bronch  Status Epilepticus   - transition prop to precedex   - s/p dilantin load, level only at 9.1 on 9/9   - s/p keppra load and continue   - neuro following   - CT head negative for new pathology  Aspiration Pneumonia   - cont Unasyn   - follow blood and respiratory cultures   - cont to monitor for sepsis/shock  Hx of TBI with residual seizure activity   - cont dilantin   - CT head reviewed and no new pathology  Hx of Alcohol Abuse   - monitor for withdrawal   - cont propofol   - continue MVI/folate/thiamine  Hyponatremia   - improved  Traumatic Hematuria   - may need cbi    Prophylaxis   - pepcid, enteral feedings, bowel protocol   - enoxaparin    Discussed patient condition and risk of morbidity and/or mortality with RN, RT, Pharmacy, , [facility] resident and neurology.    The patient remains critically ill.  Critical care time =  38 minutes in directly providing and coordinating critical care and extensive data review.  No time overlap and excludes procedures.

## 2017-09-11 NOTE — PROCEDURES
VIDEO ELECTROENCEPHALOGRAM REPORT        Referring MD: Dr. Meyer.      DOS:  9/11/2017 (total recording of 27 minutes).      INDICATION:  Crescencio Eller 62 y.o. male presenting with possible status epilepticus.      CURRENT ANTIEPILEPTIC REGIMEN: Levetiracetam and Propofol, the latter was held during the study.      TECHNIQUE: 30 channel video electroencephalogram (EEG) was performed in accordance with the international 10-20 system. The study was reviewed in bipolar and referential montages. The recording examined the patient during wakeful and drowsy/sleep state(s).      DESCRIPTION OF THE RECORD:  During the wakefulness, the background showed a symmetrical 16-20 Hz diffuse beta activity.      During the sleep state, background shows diffuse delta activity.  Symmetrical vertex sharps were seen in the leads over the central regions.       ACTIVATION PROCEDURES:   Intermittent Photic stimulation was performed in a stepwise fashion from 1 to 30 Hz , however it failed to produce any significant background changes.        ICTAL AND/OR INTERICTAL FINDINGS:   No focal or generalized epileptiform activity noted. No regional slowing was seen during this routine study.  No clinical events or seizures were reported or recorded during the study.      EKG: sampling of the EKG recording demonstrated sinus rhythm.         INTERPRETATION:  This is an abnormal video EEG recording in the awake, and sleep state(s), due to excessive fast background activity, which can be seen with the use of sedatives. No seizures were captured. Clinical correlation is recommended.     Note: This EEG does not rule out epilepsy.  If the clinical suspicion remains high for seizures, a prolonged recording to capture clinical or subclinical events may be helpful.           Molina Santos MD  Medical Director, Epilepsy and Neurodiagnostics.   Clinical  of Neurology [facility].   Diplomate in Neurology,  Epilepsy, and Electrodiagnostic Medicine.   Office: [phone]  Fax: [phone]    GORDO BOGGS    DD:  09/11/2017 15:47:15  DT:  09/11/2017 16:03:46    D#:  [id number]  Job#:  [id number]

## 2017-09-11 NOTE — PROGRESS NOTES
Neurology Progress Note        Subjective:  No further seizures noted overnight.  When the propofol is off he becomes quite agitated, pulling at lines.      Objective:  Vitals:  Vitals:    09/11/17 1347 09/11/17 1400 09/11/17 1500 09/11/17 1512   BP:       Pulse:  94 87    Resp:       Temp:       SpO2: 100% 98% 96% 100%   Weight:       Height:         General:  Intubated and sedated.  Mental Status:  Attempts to open eyes today, not following commands at the moment, more sedated than yesterday.  Cranial Nerves:  PERRL, eyes are in the midline and conjugate, face is symmetric.  Motor: Moves all 4 limbs, but brisker movement on the left side.  Reflexes:  toes downgoing bilaterally  Coordination:  deferred  Gait:  Deferred       Recent Labs      09/09/17   0700  09/10/17   0400  09/11/17   0400   WBC  10.3  9.1  8.0   RBC  3.86*  3.84*  3.86*   HEMOGLOBIN  13.0*  12.5*  12.7*   HEMATOCRIT  37.0*  37.3*  37.9*   MCV  95.9  97.1  98.2*   MCH  33.7*  32.6  32.9   RDW  52.6*  55.7*  57.3*   PLATELETCT  299  276  250   MPV  7.9*  7.9*  8.7*   NEUTSPOLYS  92.20*  83.60*  73.90*   LYMPHOCYTES  2.80*  11.00*  19.50*   MONOCYTES  3.80  3.70  4.90   EOSINOPHILS  0.00  0.80  1.00   BASOPHILS  0.50  0.60  0.30     Recent Labs      09/09/17   0700  09/10/17   0400  09/11/17   0400   SODIUM  127*  137  140   POTASSIUM  4.1  3.6  3.6   CHLORIDE  93*  103  105   CO2  25  26  25   GLUCOSE  135*  112*  111*   BUN  5*  7*  10         Impression: Crescencio is a  62 y.o male with history of TBI in 2010 and seizure d/o who presents in status epilepticus. He remains intubated and sedated but no seizures have been noted in 48 hours.  The presumed etiology is alcohol withdrawal.  He is having purposeful movements when sedation is weaned down      Recommendations:  1.  Continue phenytoin and keppra.  2.  No further recommendations at this time, will sign off.  Please call if any future concerns or further seizure activity.

## 2017-09-11 NOTE — CARE PLAN
Problem: Venous Thromboembolism (VTW)/Deep Vein Thrombosis (DVT) Prevention:  Goal: Patient will participate in Venous Thrombosis (VTE)/Deep Vein Thrombosis (DVT)Prevention Measures    Intervention: Ensure patient wears graduated elastic stockings (SOTO hose) and/or SCDs, if ordered, when in bed or chair (Remove at least once per shift for skin check)  Intermittent sequential compression device in place 23 hours per day   Patient educated on importance of SCD's. SCD place bilaterally on lower extremities. Alternative interventions in use. Patient to edge of bed. Will continue to assess and monitor.        Problem: Skin Integrity  Goal: Risk for impaired skin integrity will decrease    Intervention: Assess risk factors for impaired skin integrity and/or pressure ulcers  Skin assessed at beginning of shift and every shift. Nutrition evaluated. Moisturizer assessed . Sensory assessed. Activity assessed. Mobility addressed with patient and family. And friction and shear addressed. Alhaji score completed and documented.  Will continue to assess and monitor

## 2017-09-12 ENCOUNTER — APPOINTMENT (OUTPATIENT)
Dept: RADIOLOGY | Facility: MEDICAL CENTER | Age: 62
DRG: 987 | End: 2017-09-12
Attending: INTERNAL MEDICINE
Payer: MEDICARE

## 2017-09-12 LAB
ALBUMIN SERPL BCP-MCNC: 3.6 G/DL (ref 3.2–4.9)
ALBUMIN/GLOB SERPL: 1.3 G/DL
ALP SERPL-CCNC: 126 U/L (ref 30–99)
ALT SERPL-CCNC: 18 U/L (ref 2–50)
ANION GAP SERPL CALC-SCNC: 10 MMOL/L (ref 0–11.9)
ANION GAP SERPL CALC-SCNC: 7 MMOL/L (ref 0–11.9)
AST SERPL-CCNC: 22 U/L (ref 12–45)
BASE EXCESS BLDA CALC-SCNC: 3 MMOL/L (ref -4–3)
BASOPHILS # BLD AUTO: 0.5 % (ref 0–1.8)
BASOPHILS # BLD: 0.04 K/UL (ref 0–0.12)
BILIRUB SERPL-MCNC: 0.9 MG/DL (ref 0.1–1.5)
BODY TEMPERATURE: ABNORMAL DEGREES
BUN SERPL-MCNC: 16 MG/DL (ref 8–22)
BUN SERPL-MCNC: 21 MG/DL (ref 8–22)
CALCIUM SERPL-MCNC: 8.1 MG/DL (ref 8.5–10.5)
CALCIUM SERPL-MCNC: 9.1 MG/DL (ref 8.5–10.5)
CHLORIDE SERPL-SCNC: 108 MMOL/L (ref 96–112)
CHLORIDE SERPL-SCNC: 112 MMOL/L (ref 96–112)
CO2 BLDA-SCNC: 25 MMOL/L (ref 20–33)
CO2 SERPL-SCNC: 23 MMOL/L (ref 20–33)
CO2 SERPL-SCNC: 24 MMOL/L (ref 20–33)
CREAT SERPL-MCNC: 0.6 MG/DL (ref 0.5–1.4)
CREAT SERPL-MCNC: 0.68 MG/DL (ref 0.5–1.4)
EOSINOPHIL # BLD AUTO: 0.1 K/UL (ref 0–0.51)
EOSINOPHIL NFR BLD: 1.2 % (ref 0–6.9)
ERYTHROCYTE [DISTWIDTH] IN BLOOD BY AUTOMATED COUNT: 54.7 FL (ref 35.9–50)
GFR SERPL CREATININE-BSD FRML MDRD: >60 ML/MIN/1.73 M 2
GFR SERPL CREATININE-BSD FRML MDRD: >60 ML/MIN/1.73 M 2
GLOBULIN SER CALC-MCNC: 2.8 G/DL (ref 1.9–3.5)
GLUCOSE SERPL-MCNC: 143 MG/DL (ref 65–99)
GLUCOSE SERPL-MCNC: 98 MG/DL (ref 65–99)
HCO3 BLDA-SCNC: 24.7 MMOL/L (ref 17–25)
HCT VFR BLD AUTO: 34.6 % (ref 42–52)
HGB BLD-MCNC: 11.7 G/DL (ref 14–18)
IMM GRANULOCYTES # BLD AUTO: 0.03 K/UL (ref 0–0.11)
IMM GRANULOCYTES NFR BLD AUTO: 0.4 % (ref 0–0.9)
LYMPHOCYTES # BLD AUTO: 1.15 K/UL (ref 1–4.8)
LYMPHOCYTES NFR BLD: 13.6 % (ref 22–41)
MAGNESIUM SERPL-MCNC: 2 MG/DL (ref 1.5–2.5)
MCH RBC QN AUTO: 32.4 PG (ref 27–33)
MCHC RBC AUTO-ENTMCNC: 33.8 G/DL (ref 33.7–35.3)
MCV RBC AUTO: 95.8 FL (ref 81.4–97.8)
MONOCYTES # BLD AUTO: 0.55 K/UL (ref 0–0.85)
MONOCYTES NFR BLD AUTO: 6.5 % (ref 0–13.4)
NEUTROPHILS # BLD AUTO: 6.61 K/UL (ref 1.82–7.42)
NEUTROPHILS NFR BLD: 77.8 % (ref 44–72)
NRBC # BLD AUTO: 0 K/UL
NRBC BLD AUTO-RTO: 0 /100 WBC
O2/TOTAL GAS SETTING VFR VENT: 30 %
PCO2 BLDA: 26.4 MMHG (ref 26–37)
PCO2 TEMP ADJ BLDA: 25.4 MMHG (ref 26–37)
PH BLDA: 7.58 [PH] (ref 7.4–7.5)
PH TEMP ADJ BLDA: 7.59 [PH] (ref 7.4–7.5)
PHOSPHATE SERPL-MCNC: 3.5 MG/DL (ref 2.5–4.5)
PLATELET # BLD AUTO: 231 K/UL (ref 164–446)
PMV BLD AUTO: 8.4 FL (ref 9–12.9)
PO2 BLDA: 77 MMHG (ref 64–87)
PO2 TEMP ADJ BLDA: 73 MMHG (ref 64–87)
POTASSIUM SERPL-SCNC: 2.8 MMOL/L (ref 3.6–5.5)
POTASSIUM SERPL-SCNC: 3.2 MMOL/L (ref 3.6–5.5)
PROT SERPL-MCNC: 6.4 G/DL (ref 6–8.2)
RBC # BLD AUTO: 3.61 M/UL (ref 4.7–6.1)
RHODAMINE-AURAMINE STN SPEC: NORMAL
SAO2 % BLDA: 97 % (ref 93–99)
SIGNIFICANT IND 70042: NORMAL
SITE SITE: NORMAL
SODIUM SERPL-SCNC: 141 MMOL/L (ref 135–145)
SODIUM SERPL-SCNC: 143 MMOL/L (ref 135–145)
SOURCE SOURCE: NORMAL
SPECIMEN DRAWN FROM PATIENT: ABNORMAL
WBC # BLD AUTO: 8.5 K/UL (ref 4.8–10.8)

## 2017-09-12 PROCEDURE — 80053 COMPREHEN METABOLIC PANEL: CPT

## 2017-09-12 PROCEDURE — 700102 HCHG RX REV CODE 250 W/ 637 OVERRIDE(OP): Performed by: STUDENT IN AN ORGANIZED HEALTH CARE EDUCATION/TRAINING PROGRAM

## 2017-09-12 PROCEDURE — A9270 NON-COVERED ITEM OR SERVICE: HCPCS | Performed by: PHARMACIST

## 2017-09-12 PROCEDURE — A9270 NON-COVERED ITEM OR SERVICE: HCPCS | Performed by: STUDENT IN AN ORGANIZED HEALTH CARE EDUCATION/TRAINING PROGRAM

## 2017-09-12 PROCEDURE — 85025 COMPLETE CBC W/AUTO DIFF WBC: CPT

## 2017-09-12 PROCEDURE — A9270 NON-COVERED ITEM OR SERVICE: HCPCS | Performed by: INTERNAL MEDICINE

## 2017-09-12 PROCEDURE — 84100 ASSAY OF PHOSPHORUS: CPT

## 2017-09-12 PROCEDURE — 94150 VITAL CAPACITY TEST: CPT

## 2017-09-12 PROCEDURE — 36600 WITHDRAWAL OF ARTERIAL BLOOD: CPT

## 2017-09-12 PROCEDURE — 97530 THERAPEUTIC ACTIVITIES: CPT

## 2017-09-12 PROCEDURE — 700105 HCHG RX REV CODE 258: Performed by: INTERNAL MEDICINE

## 2017-09-12 PROCEDURE — 770022 HCHG ROOM/CARE - ICU (200)

## 2017-09-12 PROCEDURE — 82803 BLOOD GASES ANY COMBINATION: CPT

## 2017-09-12 PROCEDURE — 700102 HCHG RX REV CODE 250 W/ 637 OVERRIDE(OP): Performed by: INTERNAL MEDICINE

## 2017-09-12 PROCEDURE — 83735 ASSAY OF MAGNESIUM: CPT

## 2017-09-12 PROCEDURE — 94003 VENT MGMT INPAT SUBQ DAY: CPT

## 2017-09-12 PROCEDURE — 700102 HCHG RX REV CODE 250 W/ 637 OVERRIDE(OP): Performed by: PHARMACIST

## 2017-09-12 PROCEDURE — 700101 HCHG RX REV CODE 250: Performed by: INTERNAL MEDICINE

## 2017-09-12 PROCEDURE — 700111 HCHG RX REV CODE 636 W/ 250 OVERRIDE (IP): Performed by: INTERNAL MEDICINE

## 2017-09-12 PROCEDURE — 80048 BASIC METABOLIC PNL TOTAL CA: CPT

## 2017-09-12 PROCEDURE — 71010 DX-CHEST-PORTABLE (1 VIEW): CPT

## 2017-09-12 RX ORDER — POTASSIUM CHLORIDE 20 MEQ/1
40 TABLET, EXTENDED RELEASE ORAL EVERY 4 HOURS
Status: COMPLETED | OUTPATIENT
Start: 2017-09-12 | End: 2017-09-12

## 2017-09-12 RX ORDER — POTASSIUM CHLORIDE 20 MEQ/1
40 TABLET, EXTENDED RELEASE ORAL 2 TIMES DAILY
Status: COMPLETED | OUTPATIENT
Start: 2017-09-12 | End: 2017-09-13

## 2017-09-12 RX ADMIN — POTASSIUM CHLORIDE 40 MEQ: 1500 TABLET, EXTENDED RELEASE ORAL at 08:57

## 2017-09-12 RX ADMIN — POTASSIUM CHLORIDE 40 MEQ: 1500 TABLET, EXTENDED RELEASE ORAL at 16:32

## 2017-09-12 RX ADMIN — FAMOTIDINE 20 MG: 20 TABLET, FILM COATED ORAL at 20:34

## 2017-09-12 RX ADMIN — Medication 100 MG: at 08:58

## 2017-09-12 RX ADMIN — STANDARDIZED SENNA CONCENTRATE AND DOCUSATE SODIUM 2 TABLET: 8.6; 5 TABLET, FILM COATED ORAL at 20:34

## 2017-09-12 RX ADMIN — AMPICILLIN SODIUM AND SULBACTAM SODIUM 3 G: 2; 1 INJECTION, POWDER, FOR SOLUTION INTRAMUSCULAR; INTRAVENOUS at 04:29

## 2017-09-12 RX ADMIN — FENTANYL CITRATE 100 MCG: 50 INJECTION, SOLUTION INTRAMUSCULAR; INTRAVENOUS at 04:29

## 2017-09-12 RX ADMIN — FAMOTIDINE 20 MG: 20 TABLET, FILM COATED ORAL at 08:58

## 2017-09-12 RX ADMIN — Medication 50 MCG/HR: at 09:14

## 2017-09-12 RX ADMIN — POTASSIUM CHLORIDE 40 MEQ: 1500 TABLET, EXTENDED RELEASE ORAL at 04:40

## 2017-09-12 RX ADMIN — PHENYTOIN SODIUM 100 MG: 50 INJECTION INTRAMUSCULAR; INTRAVENOUS at 14:15

## 2017-09-12 RX ADMIN — AMPICILLIN SODIUM AND SULBACTAM SODIUM 3 G: 2; 1 INJECTION, POWDER, FOR SOLUTION INTRAMUSCULAR; INTRAVENOUS at 12:22

## 2017-09-12 RX ADMIN — FENTANYL CITRATE 100 MCG: 50 INJECTION, SOLUTION INTRAMUSCULAR; INTRAVENOUS at 01:40

## 2017-09-12 RX ADMIN — LEVETIRACETAM 500 MG: 100 SOLUTION ORAL at 08:59

## 2017-09-12 RX ADMIN — DEXMEDETOMIDINE HYDROCHLORIDE 1 MCG/KG/HR: 100 INJECTION, SOLUTION INTRAVENOUS at 06:53

## 2017-09-12 RX ADMIN — ENOXAPARIN SODIUM 40 MG: 100 INJECTION SUBCUTANEOUS at 08:58

## 2017-09-12 RX ADMIN — PHENYTOIN SODIUM 100 MG: 50 INJECTION INTRAMUSCULAR; INTRAVENOUS at 04:29

## 2017-09-12 RX ADMIN — CHLORHEXIDINE GLUCONATE 15 ML: 1.2 RINSE ORAL at 08:58

## 2017-09-12 RX ADMIN — FENTANYL CITRATE 100 MCG: 50 INJECTION, SOLUTION INTRAMUSCULAR; INTRAVENOUS at 00:16

## 2017-09-12 RX ADMIN — DEXMEDETOMIDINE HYDROCHLORIDE 1.5 MCG/KG/HR: 100 INJECTION, SOLUTION INTRAVENOUS at 00:30

## 2017-09-12 RX ADMIN — PHENYTOIN SODIUM 100 MG: 50 INJECTION INTRAMUSCULAR; INTRAVENOUS at 22:16

## 2017-09-12 RX ADMIN — STANDARDIZED SENNA CONCENTRATE AND DOCUSATE SODIUM 2 TABLET: 8.6; 5 TABLET, FILM COATED ORAL at 08:58

## 2017-09-12 RX ADMIN — LEVETIRACETAM 500 MG: 100 SOLUTION ORAL at 20:34

## 2017-09-12 RX ADMIN — FOLIC ACID 1 MG: 1 TABLET ORAL at 08:58

## 2017-09-12 RX ADMIN — AMPICILLIN SODIUM AND SULBACTAM SODIUM 3 G: 2; 1 INJECTION, POWDER, FOR SOLUTION INTRAMUSCULAR; INTRAVENOUS at 17:43

## 2017-09-12 RX ADMIN — THERA TABS 1 TABLET: TAB at 08:58

## 2017-09-12 RX ADMIN — DEXMEDETOMIDINE HYDROCHLORIDE 1.5 MCG/KG/HR: 100 INJECTION, SOLUTION INTRAVENOUS at 03:30

## 2017-09-12 RX ADMIN — AMPICILLIN SODIUM AND SULBACTAM SODIUM 3 G: 2; 1 INJECTION, POWDER, FOR SOLUTION INTRAMUSCULAR; INTRAVENOUS at 23:53

## 2017-09-12 ASSESSMENT — COGNITIVE AND FUNCTIONAL STATUS - GENERAL
DRESSING REGULAR UPPER BODY CLOTHING: A LOT
HELP NEEDED FOR BATHING: A LOT
PERSONAL GROOMING: A LOT
DAILY ACTIVITIY SCORE: 12
EATING MEALS: A LOT
DRESSING REGULAR LOWER BODY CLOTHING: A LOT
TOILETING: A LOT
SUGGESTED CMS G CODE MODIFIER DAILY ACTIVITY: CL

## 2017-09-12 ASSESSMENT — COPD QUESTIONNAIRES
COPD SCREENING SCORE: 6
HAVE YOU SMOKED AT LEAST 100 CIGARETTES IN YOUR ENTIRE LIFE: YES
DURING THE PAST 4 WEEKS HOW MUCH DID YOU FEEL SHORT OF BREATH: SOME OF THE TIME
DO YOU EVER COUGH UP ANY MUCUS OR PHLEGM?: NO/ONLY WITH OCCASIONAL COLDS OR INFECTIONS

## 2017-09-12 ASSESSMENT — PULMONARY FUNCTION TESTS: FVC: 1.9

## 2017-09-12 ASSESSMENT — PAIN SCALES - GENERAL
PAINLEVEL_OUTOF10: 0
PAINLEVEL_OUTOF10: 0

## 2017-09-12 ASSESSMENT — LIFESTYLE VARIABLES: EVER_SMOKED: YES

## 2017-09-12 NOTE — PROGRESS NOTES
Urology progress note    Patient still on ventilator.    Urine clear off CBI.    Will d/c CBI   Leave hernandez for 6 more days due to cath trauma from attempted removal yesterday.  Will sign off at this time.    Jimi Epps MD

## 2017-09-12 NOTE — CARE PLAN
Problem: Risk for Infection, Impaired Wound Healing  Goal: Remain free from signs and symptoms of infection  Pt will remain free from s/s of infection.  Intervention: Assure Infection Prevention Measures in place  Infection prevention measures in place including adequate hand hygiene.      Problem: Mechanical Ventilation  Goal: Safe management of artificial airway and ventilation    Intervention: Daily awakening trials, Daily assessment of readiness, oral care, DVT/VTE Prophylaxis, Peptic Ulcer Prophylaxis, Head of bed elevated  VAP bundle in place including HOB >30, daily sedation vacations, readiness to extubate assessment, scds in place, chlorhexidine mouthwash, Q 2 h oral care.  Intervention: Collaborate with RT to Administer Medications/Treatments  Collaboration with MD and RT to extubate patient.  Intervention: Ambu bag at bedside and available for transport  Ambu bag available at the bedside/

## 2017-09-12 NOTE — CONSULTS
DATE OF SERVICE:  09/11/2017    REASON FOR CONSULTATION:  Traumatic catheter removal.    BRIEF HISTORY:  This 62-year-old male was transferred to this hospital 2 days   ago with status epilepticus.  He apparently was having multiple seizures and   it was felt due to alcohol abuse and withdrawal.  He apparently has a history   of traumatic brain injury in the past and had been on Dilantin.  He was   intubated at that hospital and transferred to this hospital on the ventilator   with _____ to subdue the seizures.  Apparently, the sedation was allowed to be   weaned.  Apparently, the catheter was traumatically removed earlier today.    The staff was unable to replace it.  They also noticed copious amounts of   blood draining from the urethra.  I was asked to consult on the patient for   catheter placement to deal with the clots.  At this point, the patient is on a   ventilator, unable to give any history.  He is somewhat arousable, but in   4-point restraints.    PAST MEDICAL HISTORY:  Significant for alcohol abuse, back pain, head trauma,   hypertension and seizure disorder.    FAMILY HISTORY:  Unknown.    SOCIAL HISTORY:  Really not known.    PAST SURGICAL HISTORY:  Significant for wilver holes of his head.    PHYSICAL EXAMINATION:  VITAL SIGNS:  Showed vital signs to be essentially stable.  He is obtunded,   but arousable.  HEENT:  Grossly unremarkable.  LUNGS:  Respiratory excursion on the ventilator was normal.  ABDOMEN:  Obese without guarding, rebound or tenderness.  GENITOURINARY:  Phallus is uncircumcised and without lesions.    At this point, I got him prepped with Betadine and under 2% Xylocaine jelly   instilled in the urethra, I was able to get a 22-Libyan 3-way catheter into   the bladder.  He did drain a large amount of grossly bloody urine and a few   clots.  I did some irrigation on him and this did get a few more clots out,   but then the urine cleared up fairly dramatically.  At this point, we hooked    him up to continuous bladder irrigation with saline to run at a slow drip   overnight to wash out any more clots that might arise and at this point about   30-40 mL of water placed in the balloon on the Benson.  At this point, we will   follow him in consultation with you and we will probably stop the continuous   bladder irrigation tomorrow.  At this point, the catheter will need to stay in   place for at least 5-6 days to allow for any urethral tears or trauma and   edema to go down and healing process to proceed and then the catheter can be   removed down the road.  At this point, final diagnosis is traumatic catheter   removal.       ____________________________________     MD NICK SRINIVASAN / FLAKITA    DD:  09/11/2017 21:17:40  DT:  09/11/2017 21:30:54    D#:  [id number]  Job#:  [id number]

## 2017-09-12 NOTE — DISCHARGE PLANNING
Discussed pt during AM Rounds. RN reports pt hx of TBI. The is non-compliant with meals. Pt is presenting as, adjatated and impulsive. The pt follows commands. The pt had a BM yesterday. The pt has not yet been mobilized. Pt is vent day, 4. There is no AD. Pt's emergency contact is his sister, Daisy. Pt has yet to be evaluated by any therapies. Discharge disposition unknown at this time.

## 2017-09-12 NOTE — CARE PLAN
Problem: Safety  Goal: Will remain free from injury  Outcome: PROGRESSING SLOWER THAN EXPECTED  Pt requiring very close monitoring from RN as he makes frequent attempts to get OOB and pull at ETT.   Pt. Reminded of importance of invasive lines and airway.  Unable to verify understanding.

## 2017-09-12 NOTE — PROGRESS NOTES
Internal Medicine Interval Note    Name Crescencio Eller     1955   Age/Sex 62 y.o. male   MRN [id number]   Code Status Full code     After 5PM or if no immediate response to page, please call for cross-coverage  Attending/Team: Dr Rojas/RENATO Bermeo See Patient List for primary contact information  Call [phone] to page    1st Call - Day Intern (R1):   Kendra 2nd Call - Day Sr. Resident (R2/R3):   Kush Connolly         Reason for interval visit  (Principal Problem)   1.  Status epilepticus.  2.  Alcohol withdrawal.  3.  Aspiration pneumonia.    Interval Problem Daily Status Update  (24 hours)   9/10 pt currently stable, he has one episode of vomiting last night, held tube feeding, no more seizure, bronch done yesterday, doing SBT, initial Bcx neg, pending tracheal aspirate Cx   - Bronch today. Continue keppra/phenytoin and unasyn  DC IVF.     - Extubated    Review of Systems   Unable to perform ROS: Mental acuity      Unable to obtain due to the patient being intubated.    Consultants/Specialty  PMA    Disposition  ICU    Quality Measures    Reviewed items::  EKG reviewed, Labs reviewed, Medications reviewed and Radiology images reviewed  Benson catheter::  Critically Ill - Requiring Accurate Measurement of Urinary Output  DVT prophylaxis pharmacological::  Enoxaparin (Lovenox)          Physical Exam       Vitals:    17 1129 17 1149 17 1200 17 1350   BP:       Pulse:    88   Resp:    20   Temp:   36.7 °C (98 °F)    SpO2: 100% 98%  100%   Weight:       Height:         Body mass index is 32.04 kg/m². Weight: 101.3 kg (223 lb 5.2 oz)  Oxygen Therapy:  Pulse Oximetry: 100 %, O2 (LPM): 2, FIO2%: 30, O2 Delivery: Silicone Nasal Cannula    Physical Exam   Constitutional: He is oriented to person, place, and time.   HENT:   Head: Normocephalic and atraumatic.   Eyes: Pupils are equal, round, and reactive to light. Right eye exhibits no discharge. No scleral icterus.   Neck: No JVD  present.   Cardiovascular: Normal rate.  Exam reveals no friction rub.    No murmur heard.  Pulmonary/Chest: No respiratory distress.   Musculoskeletal: He exhibits no edema or tenderness.   Neurological: He is alert and oriented to person, place, and time.   Skin: Skin is warm and dry.     Lab Data Review:         9/10/2017  8:25 AM    Recent Labs      09/10/17   0400  09/11/17   0400  09/12/17   0310  09/12/17   1315   SODIUM  137  140  141  143   POTASSIUM  3.6  3.6  2.8*  3.2*   CHLORIDE  103  105  108  112   CO2  26  25  23  24   BUN  7*  10  16  21   CREATININE  0.74  0.66  0.68  0.60   MAGNESIUM  2.6*  2.2  2.0   --    PHOSPHORUS  3.8  3.2  3.5   --    CALCIUM  8.8  8.9  9.1  8.1*       Recent Labs      09/10/17   0400  09/11/17   0400  09/12/17   0310  09/12/17   1315   ALTSGPT  29  20  18   --    ASTSGOT  41  32  22   --    ALKPHOSPHAT  119*  121*  126*   --    TBILIRUBIN  1.0  0.9  0.9   --    PREALBUMIN   --   13.0*   --    --    GLUCOSE  112*  111*  143*  98       Recent Labs      09/10/17   0400  09/11/17   0400  09/12/17   0310   RBC  3.84*  3.86*  3.61*   HEMOGLOBIN  12.5*  12.7*  11.7*   HEMATOCRIT  37.3*  37.9*  34.6*   PLATELETCT  276  250  231       Recent Labs      09/10/17   0400  09/11/17   0400  09/12/17   0310   WBC  9.1  8.0  8.5   NEUTSPOLYS  83.60*  73.90*  77.80*   LYMPHOCYTES  11.00*  19.50*  13.60*   MONOCYTES  3.70  4.90  6.50   EOSINOPHILS  0.80  1.00  1.20   BASOPHILS  0.60  0.30  0.50   ASTSGOT  41  32  22   ALTSGPT  29  20  18   ALKPHOSPHAT  119*  121*  126*   TBILIRUBIN  1.0  0.9  0.9     Problem List  1.  Status epilepticus  2.  Alcohol withdrawal.  3.  Aspiration pneumonia.  4.  Traumatic brain injury.  5.  Noncompliance with medication.  6.  History of right-sided hemiparesis.  7.  HTN  8.  Acute hypoxic respiratory failure    - extubated 9/12  9.  Hypokalemia  10. Traumatic catheter removal    Assessment: 62 year old male who presented with status epilepticus lkely secondary  to alcohol withdrawal and history of TBI. CT head was negative for structural abnormalities to explain seizure activity. He is on phenytoin and keppra per neurology recommendations. He was intubated for airway protection. Extubated today, doing well on 2-3 liters nasal cannula. Hernandez catheter placed yesterday following traumatic catheter removal    PLAN:  - cont  Keppra 500 mg b.i.d. & phenytoin 100 mg t.i.d. in IV form.  - Unasyn 3 g q. 6 hours for aspiration pneumonia.  - Follow up cultures  - Continue phenytoin and keppra.  - Follow up cultures  - Fentanyl PRN pain control   - PT/OT  - Pepcid  - Keep hernandez through 9/18 per urology  - Replace potassium

## 2017-09-12 NOTE — PROGRESS NOTES
URology consult  Full consult dictated.    63 y/o male admitted with DT and on ventilator.  HAd traumatic cath removal earlier today and now with gross clots.  Staff unable to pass hernandez.  22 F 3 way hernandez inserted into bladder and large amount of bloody urine drained .  Several clots evacuated.  Will run CBI overnight and probably able to DC in am.  Will need to leave hernandez for 5-7 days due to urethral trauma and swelling.  Will follow.    Jimi Epps MD

## 2017-09-12 NOTE — CARE PLAN
Problem: Ventilation Defect:  Goal: Ability to achieve and maintain unassisted ventilation or tolerate decreased levels of ventilator support  Outcome: PROGRESSING AS EXPECTED    Intervention: Support and monitor invasive and noninvasive mechanical ventilation  Adult Ventilation Update    Total Vent Days: 4    Patient Lines/Drains/Airways Status    Active Airway     Name: Placement date: Placement time: Site: Days:    Airway Group ET Tube Oral 8.0 09/09/17      Oral   4              In the last 24 hours, the patient tolerated SBT for 2 hours on settings of 5/8.             Plateau Pressure (Q Shift): 16 (09/11/17 1902)  Static Compliance (ml / cm H2O): 44 (09/12/17 0216)    Patient failed trials because of Barriers to Wean: Other (Comments) (pt not participating in weaning parameters) (09/11/17 1512)  Barriers to SBT Weaning Trial Stopped due to:: Pt weaned for 1 hour and returned to rest settings per protocol (09/11/17 1512)  Length of Weaning Trial Length of Weaning Trial (Hours): 2 (09/11/17 1512)        Sputum/Suction   Cough: Productive (09/12/17 0216)  Sputum Amount: Small (09/12/17 0216)  Sputum Color: White;Yellow (09/12/17 0216)  Sputum Consistency: Thin;Thick (09/12/17 0216)    Mobility Group  Activity Performed: Edge of bed (09/11/17 1000)  Time Activity Tolerated: 15 min (09/11/17 1000)  Assistance / Tolerance: Assistance of Two or More;Tolerates Well (09/11/17 1000)  Pt Calls for Assistance: No (09/11/17 2000)  Staff Present for Mobilization: RN;PT (09/11/17 1000)  Reason Not Mobilized: Other (comment) (ETOH withdrawals) (09/10/17 2153)    Events/Summary/Plan: No changes overnight.

## 2017-09-12 NOTE — PROGRESS NOTES
"Pulmonary Critical Care Progress Note        Date of Service:  9/12/2017    History of Present Illness: 62 y.o. male admitted on 9/9/2017 for status epilepticus requiring intubation and propofol drip     ROS:  Unable to obtain as the patient is sedated and on the ventilator    Interval Events:  24 hour interval history reviewed   Tm 100.4  +180cc over last 24hrs, -4.5L since admit  Cxr, per my read, marginally improved atelectatic changes   Hematuria clearing  Repeat EEG 9/11 without evidence of sz      Exam:  Blood pressure 125/84, pulse 62, temperature 37 °C (98.6 °F), resp. rate (!) 44, height 1.778 m (5' 10\"), weight 101.3 kg (223 lb 5.2 oz), SpO2 97 %.  Gen: nad, sedate  Heent: nc, at  Cvs: rate wnl  Resp: diminished with scattered rhonchi  Abdo: s, nd  Ext: + edema  Neuro: sedate      PFSH:  No change.    Respiratory:  Manuel Vent Mode: Spont, Rate (breaths/min): 15, Vt Target (mL): 440, PEEP/CPAP: 8, FiO2: 30, P Support: 5, Static Compliance (ml / cm H2O): 54, Weaning Trial Stopped due to:: Apnea > 30 seconds X 4, Control VTE (exp VT): 281  Pulse Oximetry: 97 %    ImagingAvailable data reviewed   Recent Labs      09/10/17   0426  09/11/17   0435  09/12/17   0440   ISTATAPH  7.468  7.550*  7.579*   ISTATAPCO2  39.3*  31.3  26.4   ISTATAPO2  96*  58*  77   ISTATATCO2  30  28  25   [id number]  98  93  97   ISTATARTHCO3  28.5*  27.4*  24.7   ISTATARTBE  4*  5*  3   ISTATTEMP  98.8 F  37.7 C  97.0 F   ISTATFIO2  40  30  30   ISTATSPEC  Arterial  Arterial  Arterial   ISTATAPHTC  7.466  7.539*  7.593*   [id number]  97*  60*  73       HemoDynamics:  Pulse: 62, Heart Rate (Monitored): 64  NIBP: 120/81         Imaging: Available data reviewed        Neuro:  GCS Total Rosales Coma Score: 11         Imaging: Available data reviewed    Fluids:  Intake/Output       09/10/17 0700 - 09/11/17 0659 09/11/17 0700 - 09/12/17 0659 09/12/17 0700 - 09/13/17 0659      4331-5069 2789-9727 Total 9376-7586 0499-4243 Total 6750-1279 " 6368-5633 Total       Intake    I.V.  1654.6  438.8 2093.4  1139.2  100 1239.2  --  -- --    Propofol Volume 524.6 272.8 797.4 175.2 -- 175.2 -- -- --    IV Piggyback Volume (IVPB) 300 -- 300 300 100 400 -- -- --     166 996 664 -- 664 -- -- --    Other  --  -- --  3000  30 3030  --  -- --    Medications (P.O./ Enteral Liquids) -- -- -- 100 30 130 -- -- --    CBI Volume (CBI Intake) -- -- -- 2900 -- 2900 -- -- --    Enteral  660  660 1320  690  950 1640  --  -- --    Enteral Volume   -- -- --    Free Water / Tube Flush 120 60 180 90 120 210 -- -- --    PROTEIN -- 60 60 60 60 120 -- -- --    Total Intake 2314.6 1098.8 3413.4 4829.2 1080 5909.2 -- -- --       Output    Urine  1800  2520 4320  4770  950 5720  --  -- --    Indwelling Cathether 1800 2520 4320 4937 347 4919 -- -- --    CBI Output (CBI Output) -- -- -- 2950 -- 2950 -- -- --    Total Output 1800 2520 4320 4770 950 5720 -- -- --       Net I/O     514.6 -1421.2 -906.6 59.2 130 189.2 -- -- --        Weight: 101.3 kg (223 lb 5.2 oz)  Recent Labs      09/10/17   04017   04017   0310   SODIUM  137  140  141   POTASSIUM  3.6  3.6  2.8*   CHLORIDE  103  105  108   CO2  26  25  23   BUN  7*  10  16   CREATININE  0.74  0.66  0.68   MAGNESIUM  2.6*  2.2  2.0   PHOSPHORUS  3.8  3.2  3.5   CALCIUM  8.8  8.9  9.1       GI/Nutrition:    Imaging: Available data reviewed  tube feed Tolerated  Liver Function  Recent Labs      09/10/17   0400  09/11/17   040  17   ALTSGPT  29  20  18   ASTSGOT  41  32  22   ALKPHOSPHAT  119*  121*  126*   TBILIRUBIN  1.0  0.9  0.9   PREALBUMIN   --   13.0*   --    GLUCOSE  112*  111*  143*       Heme:  Recent Labs      09/10/17   0400  17   RBC  3.84*  3.86*  3.61*   HEMOGLOBIN  12.5*  12.7*  11.7*   HEMATOCRIT  37.3*  37.9*  34.6*   PLATELETCT  276  250  231       Infectious Disease:  Monitored Temp  Av.3 °C (97.3 °F)  Min: 34.6 °C (94.3 °F)   Max: 37.4 °C (99.3 °F)  Temp  Av.2 °C (99 °F)  Min: 36.7 °C (98 °F)  Max: 38 °C (100.4 °F)  Micro: cultures reviewed  Recent Labs      09/10/17   0400  17   0400  17   0310   WBC  9.1  8.0  8.5   NEUTSPOLYS  83.60*  73.90*  77.80*   LYMPHOCYTES  11.00*  19.50*  13.60*   MONOCYTES  3.70  4.90  6.50   EOSINOPHILS  0.80  1.00  1.20   BASOPHILS  0.60  0.30  0.50   ASTSGOT  41  32  22   ALTSGPT  29  20  18   ALKPHOSPHAT  119*  121*  126*   TBILIRUBIN  1.0  0.9  0.9     Current Facility-Administered Medications   Medication Dose Frequency Provider Last Rate Last Dose   • potassium chloride SA (Kdur) tablet 40 mEq  40 mEq Q4HRS Sharmila Hatch M.D.   40 mEq at 17 0440   • fentaNYL (SUBLIMAZE) injection 25 mcg  25 mcg Q HOUR PRN Dheeraj Martin M.D.        Or   • fentaNYL (SUBLIMAZE) injection 50 mcg  50 mcg Q HOUR PRN Dheeraj Martin M.D.   50 mcg at 17 1113    Or   • fentaNYL (SUBLIMAZE) injection 100 mcg  100 mcg Q HOUR PRN Dheeraj Martin M.D.   100 mcg at 17 0429   • famotidine (PEPCID) tablet 20 mg  20 mg BID Jessica Nj PharmD   20 mg at 17   • levetiracetam (KEPPRA) 100 MG/ML solution 500 mg  500 mg Q12HRS Jessica Nj, PharmD   500 mg at 17   • dexmedetomidine (PRECEDEX) 400 mcg in  mL infusion  0-1.5 mcg/kg/hr Continuous Dheeraj Martin M.D. 25.2 mL/hr at 17 0653 1 mcg/kg/hr at 17 0653   • senna-docusate (PERICOLACE or SENOKOT S) 8.6-50 MG per tablet 2 Tab  2 Tab BID Mithc James M.D.   2 Tab at 17    And   • polyethylene glycol/lytes (MIRALAX) PACKET 1 Packet  1 Packet QDAY PRN Mitch James M.D.        And   • magnesium hydroxide (MILK OF MAGNESIA) suspension 30 mL  30 mL QDAY PRN Mitch James M.D.        And   • bisacodyl (DULCOLAX) suppository 10 mg  10 mg QDAY PRN Mitch James M.D.       • Respiratory Care per Protocol   Continuous RT Mitch James M.D.       • labetalol (NORMODYNE,TRANDATE) injection 10 mg  10 mg  Q4HRS PRN Mitch James M.D.       • glucose 4 g chewable tablet 16 g  16 g Q15 MIN PRN Mitch James M.D.        And   • dextrose 50% (D50W) injection 25 mL  25 mL Q15 MIN PRN Mitch James M.D.       • lorazepam (ATIVAN) injection 4 mg  4 mg Q10 MIN PRN Mitch James M.D.       • thiamine (THIAMINE) tablet 100 mg  100 mg DAILY Mitch James M.D.   100 mg at 09/11/17 0826    And   • folic acid (FOLVITE) tablet 1 mg  1 mg DAILY GENET PraterDAntonio   1 mg at 09/11/17 0834    And   • multivitamin (THERAGRAN) tablet 1 Tab  1 Tab DAILY Mitch James M.D.   1 Tab at 09/11/17 0826   • ampicillin/sulbactam (UNASYN) 3 g in  mL IVPB  3 g Q6HRS Mitch James M.D.   Stopped at 09/12/17 0459   • enoxaparin (LOVENOX) inj 40 mg  40 mg DAILY Mitch James M.D.   40 mg at 09/11/17 0833   • phenytoin (DILANTIN) injection 100 mg  100 mg Q8HRS Mitch James M.D.   100 mg at 09/12/17 0429   • chlorhexidine (PERIDEX) 0.12 % solution 15 mL  15 mL BID Jessica Clark M.D.   15 mL at 09/11/17 2000   • lidocaine (XYLOCAINE) 1%  injection  1-2 mL Q30 MIN PRN Jessica Clark M.D.       • MD ALERT...Adult ICU Electrolyte Replacement per Pharmacy Protocol   pharmacy to dose Jessica Clark M.D.       • propofol (DIPRIVAN) injection  0-80 mcg/kg/min Continuous Getachew Andujar, PharmD   Stopped at 09/11/17 1400     Last reviewed on 9/9/2017  7:50 AM by Hitesh Rand    Quality  Measures:  Medications reviewed, Labs reviewed and Radiology images reviewed                  Lines  Piv    Gtt  Prop off  Dex 1    Abx  Unasyn    keppra  Phenytoin     bcx 9/9 ngtd  scx 9/9 ngtd    Problems/Plan:  Acute Hypoxic Respiratory Failure   - due to status epilepticus and need for airway protection   - intubated on 9/9   - cont full ventilator support   - cont RT/O2 protocols   - gentle diureses    - bronch 9/11, f/u bal  Status Epilepticus   - transitioned prop to precedex   - s/p dilantin load, level only at 9.1 on 9/9   - s/p keppra load and  continue   - neuro following   - CT head negative for new pathology  Aspiration Pneumonia   - cont Unasyn   - follow blood and respiratory cultures   - cont to monitor for sepsis/shock  Hx of TBI with residual seizure activity   - cont dilantin   - CT head reviewed and no new pathology  Hx of Alcohol Abuse   - monitor for withdrawal   - cont propofol   - continue MVI/folate/thiamine  Hyponatremia   - improved  Traumatic Hematuria   - cbi    Prophylaxis   - pepcid, enteral feedings, bowel protocol   - enoxaparin    Discussed patient condition and risk of morbidity and/or mortality with RN, RT, Pharmacy, , [facility] resident and neurology.    The patient remains critically ill.  Critical care time = 32 minutes in directly providing and coordinating critical careand extensive data review.  No time overlap and excludes procedures.

## 2017-09-13 ENCOUNTER — APPOINTMENT (OUTPATIENT)
Dept: RADIOLOGY | Facility: MEDICAL CENTER | Age: 62
DRG: 987 | End: 2017-09-13
Attending: INTERNAL MEDICINE
Payer: MEDICARE

## 2017-09-13 LAB
ALBUMIN SERPL BCP-MCNC: 3.4 G/DL (ref 3.2–4.9)
ALBUMIN/GLOB SERPL: 1.1 G/DL
ALP SERPL-CCNC: 129 U/L (ref 30–99)
ALT SERPL-CCNC: 21 U/L (ref 2–50)
ANION GAP SERPL CALC-SCNC: 6 MMOL/L (ref 0–11.9)
AST SERPL-CCNC: 26 U/L (ref 12–45)
BACTERIA SPEC RESP CULT: NORMAL
BASOPHILS # BLD AUTO: 0.8 % (ref 0–1.8)
BASOPHILS # BLD: 0.04 K/UL (ref 0–0.12)
BILIRUB SERPL-MCNC: 0.7 MG/DL (ref 0.1–1.5)
BUN SERPL-MCNC: 15 MG/DL (ref 8–22)
CALCIUM SERPL-MCNC: 8.9 MG/DL (ref 8.5–10.5)
CHLORIDE SERPL-SCNC: 109 MMOL/L (ref 96–112)
CO2 SERPL-SCNC: 27 MMOL/L (ref 20–33)
CREAT SERPL-MCNC: 0.62 MG/DL (ref 0.5–1.4)
EOSINOPHIL # BLD AUTO: 0.2 K/UL (ref 0–0.51)
EOSINOPHIL NFR BLD: 4.2 % (ref 0–6.9)
ERYTHROCYTE [DISTWIDTH] IN BLOOD BY AUTOMATED COUNT: 56.8 FL (ref 35.9–50)
GFR SERPL CREATININE-BSD FRML MDRD: >60 ML/MIN/1.73 M 2
GLOBULIN SER CALC-MCNC: 3 G/DL (ref 1.9–3.5)
GLUCOSE SERPL-MCNC: 90 MG/DL (ref 65–99)
GRAM STN SPEC: NORMAL
HCT VFR BLD AUTO: 34 % (ref 42–52)
HGB BLD-MCNC: 11.2 G/DL (ref 14–18)
IMM GRANULOCYTES # BLD AUTO: 0.02 K/UL (ref 0–0.11)
IMM GRANULOCYTES NFR BLD AUTO: 0.4 % (ref 0–0.9)
LYMPHOCYTES # BLD AUTO: 1.31 K/UL (ref 1–4.8)
LYMPHOCYTES NFR BLD: 27.6 % (ref 22–41)
MAGNESIUM SERPL-MCNC: 2.1 MG/DL (ref 1.5–2.5)
MCH RBC QN AUTO: 32.8 PG (ref 27–33)
MCHC RBC AUTO-ENTMCNC: 32.9 G/DL (ref 33.7–35.3)
MCV RBC AUTO: 99.7 FL (ref 81.4–97.8)
MONOCYTES # BLD AUTO: 0.55 K/UL (ref 0–0.85)
MONOCYTES NFR BLD AUTO: 11.6 % (ref 0–13.4)
NEUTROPHILS # BLD AUTO: 2.62 K/UL (ref 1.82–7.42)
NEUTROPHILS NFR BLD: 55.4 % (ref 44–72)
NRBC # BLD AUTO: 0.02 K/UL
NRBC BLD AUTO-RTO: 0.4 /100 WBC
PLATELET # BLD AUTO: 211 K/UL (ref 164–446)
PMV BLD AUTO: 8.7 FL (ref 9–12.9)
POTASSIUM SERPL-SCNC: 3.6 MMOL/L (ref 3.6–5.5)
PROT SERPL-MCNC: 6.4 G/DL (ref 6–8.2)
RBC # BLD AUTO: 3.41 M/UL (ref 4.7–6.1)
SIGNIFICANT IND 70042: NORMAL
SITE SITE: NORMAL
SODIUM SERPL-SCNC: 142 MMOL/L (ref 135–145)
SOURCE SOURCE: NORMAL
WBC # BLD AUTO: 4.7 K/UL (ref 4.8–10.8)

## 2017-09-13 PROCEDURE — G8996 SWALLOW CURRENT STATUS: HCPCS | Mod: CJ

## 2017-09-13 PROCEDURE — 83735 ASSAY OF MAGNESIUM: CPT

## 2017-09-13 PROCEDURE — 71010 DX-CHEST-PORTABLE (1 VIEW): CPT

## 2017-09-13 PROCEDURE — 700102 HCHG RX REV CODE 250 W/ 637 OVERRIDE(OP): Performed by: INTERNAL MEDICINE

## 2017-09-13 PROCEDURE — 700111 HCHG RX REV CODE 636 W/ 250 OVERRIDE (IP): Performed by: INTERNAL MEDICINE

## 2017-09-13 PROCEDURE — A9270 NON-COVERED ITEM OR SERVICE: HCPCS | Performed by: PHARMACIST

## 2017-09-13 PROCEDURE — G8997 SWALLOW GOAL STATUS: HCPCS | Mod: CI

## 2017-09-13 PROCEDURE — 700105 HCHG RX REV CODE 258: Performed by: INTERNAL MEDICINE

## 2017-09-13 PROCEDURE — 700102 HCHG RX REV CODE 250 W/ 637 OVERRIDE(OP): Performed by: PHARMACIST

## 2017-09-13 PROCEDURE — 92610 EVALUATE SWALLOWING FUNCTION: CPT

## 2017-09-13 PROCEDURE — 770006 HCHG ROOM/CARE - MED/SURG/GYN SEMI*

## 2017-09-13 PROCEDURE — 80053 COMPREHEN METABOLIC PANEL: CPT

## 2017-09-13 PROCEDURE — 85025 COMPLETE CBC W/AUTO DIFF WBC: CPT

## 2017-09-13 PROCEDURE — A9270 NON-COVERED ITEM OR SERVICE: HCPCS | Performed by: INTERNAL MEDICINE

## 2017-09-13 PROCEDURE — A9270 NON-COVERED ITEM OR SERVICE: HCPCS | Performed by: STUDENT IN AN ORGANIZED HEALTH CARE EDUCATION/TRAINING PROGRAM

## 2017-09-13 PROCEDURE — 700102 HCHG RX REV CODE 250 W/ 637 OVERRIDE(OP): Performed by: STUDENT IN AN ORGANIZED HEALTH CARE EDUCATION/TRAINING PROGRAM

## 2017-09-13 RX ADMIN — AMPICILLIN SODIUM AND SULBACTAM SODIUM 3 G: 2; 1 INJECTION, POWDER, FOR SOLUTION INTRAMUSCULAR; INTRAVENOUS at 17:29

## 2017-09-13 RX ADMIN — PHENYTOIN SODIUM 100 MG: 50 INJECTION INTRAMUSCULAR; INTRAVENOUS at 21:51

## 2017-09-13 RX ADMIN — THERA TABS 1 TABLET: TAB at 08:05

## 2017-09-13 RX ADMIN — FAMOTIDINE 20 MG: 20 TABLET, FILM COATED ORAL at 08:05

## 2017-09-13 RX ADMIN — LEVETIRACETAM 500 MG: 100 SOLUTION ORAL at 21:35

## 2017-09-13 RX ADMIN — STANDARDIZED SENNA CONCENTRATE AND DOCUSATE SODIUM 2 TABLET: 8.6; 5 TABLET, FILM COATED ORAL at 21:35

## 2017-09-13 RX ADMIN — ENOXAPARIN SODIUM 40 MG: 100 INJECTION SUBCUTANEOUS at 08:06

## 2017-09-13 RX ADMIN — POTASSIUM CHLORIDE 40 MEQ: 1500 TABLET, EXTENDED RELEASE ORAL at 08:06

## 2017-09-13 RX ADMIN — PHENYTOIN SODIUM 100 MG: 50 INJECTION INTRAMUSCULAR; INTRAVENOUS at 05:33

## 2017-09-13 RX ADMIN — LEVETIRACETAM 500 MG: 100 SOLUTION ORAL at 08:05

## 2017-09-13 RX ADMIN — Medication 100 MG: at 08:05

## 2017-09-13 RX ADMIN — FOLIC ACID 1 MG: 1 TABLET ORAL at 08:05

## 2017-09-13 RX ADMIN — AMPICILLIN SODIUM AND SULBACTAM SODIUM 3 G: 2; 1 INJECTION, POWDER, FOR SOLUTION INTRAMUSCULAR; INTRAVENOUS at 05:31

## 2017-09-13 RX ADMIN — PHENYTOIN SODIUM 100 MG: 50 INJECTION INTRAMUSCULAR; INTRAVENOUS at 14:02

## 2017-09-13 RX ADMIN — FAMOTIDINE 20 MG: 20 TABLET, FILM COATED ORAL at 21:35

## 2017-09-13 RX ADMIN — AMPICILLIN SODIUM AND SULBACTAM SODIUM 3 G: 2; 1 INJECTION, POWDER, FOR SOLUTION INTRAMUSCULAR; INTRAVENOUS at 11:24

## 2017-09-13 RX ADMIN — STANDARDIZED SENNA CONCENTRATE AND DOCUSATE SODIUM 2 TABLET: 8.6; 5 TABLET, FILM COATED ORAL at 08:05

## 2017-09-13 ASSESSMENT — ENCOUNTER SYMPTOMS
SEIZURES: 0
PALPITATIONS: 0
CHILLS: 0
COUGH: 0
ABDOMINAL PAIN: 0
NAUSEA: 0
VOMITING: 0
SHORTNESS OF BREATH: 0
FEVER: 0
HEADACHES: 0

## 2017-09-13 ASSESSMENT — PAIN SCALES - GENERAL
PAINLEVEL_OUTOF10: 0

## 2017-09-13 NOTE — THERAPY
"  Speech Language Therapy Clinical Swallow Evaluation completed.  Functional Status: Pt ordered for thin clear liquids. Nurs held bfast tray related to pt coughing post whole pills with thin water. Pt with mild hoarse vocal quality. Pt is pleasant and cooperative but with severe confusion and tangential speech. Pt stating he had head injury \"when I was 18.\" Pt with hist of right sided weakness per EMR. Pt with mild right facial weakness. He is inconsistent following commands for oral motor assessment. 9/12 extubated with 9/13 chest xray indicating no significant change with parenchymal opacities on previous xray. Pt at times holding oral bolus for approx 5 seconds before he triggers a swallow. Pt given sips of nectar thick from spoon and cup with approx 3/4 cup given. Pt also given 1/2-1 tsp amounts of pudding with quantity of 3/4 cup given. No oral residue and with variable triggering of swallow from 3-8 seconds with the pudding. No coughing during assessment but pt with brief coughing, approx 3 coughs, 10 min later while SLP documenting. Pt was sitting at 90 degrees and with clear voice. At this time, limited PO intake with two items per tray with strict one to one feeding and NTL full. Nurs educ to hold intake with any difficulty. High f/u for Wed. Swallow strategies posted. Pt demonstrating s/s moderate dysphagia/  Recommendations - Diet: Diet / Liquid Recommendation: Nectar Thick Liquid                          Strategies: Strict 1:1 feeding, slow rate, sitting up at 90 degrees. No straws                          Medication Administration: Medication Administration : Crush all Medications in Puree  Plan of Care: Will benefit from Speech Therapy 3 times per week  Post-Acute Therapy: Currently anticipate no further skilled therapy needs once patient is discharged from the inpatient setting. Thanks, Helder    See \"Rehab Therapy-Acute\" Patient Summary Report for complete documentation.   "

## 2017-09-13 NOTE — PROGRESS NOTES
Internal Medicine Interval Note    Name Crescencio Eller     1955   Age/Sex 62 y.o. male   MRN [id number]   Code Status Full code     After 5PM or if no immediate response to page, please call for cross-coverage  Attending/Team: Dr Rojas/RENATO Bermeo See Patient List for primary contact information  Call [phone] to page    1st Call - Day Intern (R1):   Kendra 2nd Call - Day Sr. Resident (R2/R3):   Kush Connolly         Reason for interval visit  (Principal Problem)   1.  Status epilepticus.  2.  Alcohol withdrawal.  3.  Aspiration pneumonia.    Interval Problem Daily Status Update  (24 hours)   9/10 pt currently stable, he has one episode of vomiting last night, held tube feeding, no more seizure, bronch done yesterday, doing SBT, initial Bcx neg, pending tracheal aspirate Cx   - Bronch today. Continue keppra/phenytoin and unasyn  DC IVF.     - Extubated   - DC sedation. SLP. Unasyn stop date at 7 days.     Review of Systems   Unable to perform ROS: Mental acuity      Unable to obtain due to the patient being intubated.    Consultants/Specialty  PMA    Disposition  ICU    Quality Measures    Reviewed items::  EKG reviewed, Labs reviewed, Medications reviewed and Radiology images reviewed  Benson catheter::  Critically Ill - Requiring Accurate Measurement of Urinary Output  DVT prophylaxis pharmacological::  Enoxaparin (Lovenox)          Physical Exam       Vitals:    17 0700 17 0800 17 0900 17 1000   BP:       Pulse: 85 84 86 85   Resp:       Temp:  37 °C (98.6 °F)  37.1 °C (98.7 °F)   SpO2: 96% 96% 99% 95%   Weight:       Height:         Body mass index is 29.64 kg/m². Weight: 93.7 kg (206 lb 9.1 oz)  Oxygen Therapy:  Pulse Oximetry: 95 %, O2 (LPM): 2, O2 Delivery: Silicone Nasal Cannula    Physical Exam   Constitutional: He is oriented to person, place, and time.   HENT:   Head: Normocephalic and atraumatic.   Eyes: Pupils are equal, round, and reactive to light. Right eye  exhibits no discharge. No scleral icterus.   Neck: No JVD present.   Cardiovascular: Normal rate.  Exam reveals no friction rub.    No murmur heard.  Pulmonary/Chest: No respiratory distress.   Musculoskeletal: He exhibits no edema or tenderness.   Neurological: He is alert and oriented to person, place, and time.   Skin: Skin is warm and dry.     Lab Data Review:         9/10/2017  8:25 AM    Recent Labs      09/11/17 0400 09/12/17 0310 09/12/17 1315 09/13/17 0415   SODIUM  140  141  143  142   POTASSIUM  3.6  2.8*  3.2*  3.6   CHLORIDE  105  108  112  109   CO2  25  23  24  27   BUN  10  16  21  15   CREATININE  0.66  0.68  0.60  0.62   MAGNESIUM  2.2  2.0   --   2.1   PHOSPHORUS  3.2  3.5   --    --    CALCIUM  8.9  9.1  8.1*  8.9       Recent Labs      09/11/17 0400 09/12/17 0310 09/12/17 1315 09/13/17 0415   ALTSGPT  20  18   --   21   ASTSGOT  32  22   --   26   ALKPHOSPHAT  121*  126*   --   129*   TBILIRUBIN  0.9  0.9   --   0.7   PREALBUMIN  13.0*   --    --    --    GLUCOSE  111*  143*  98  90       Recent Labs      09/11/17 0400 09/12/17 0310 09/13/17 0415   RBC  3.86*  3.61*  3.41*   HEMOGLOBIN  12.7*  11.7*  11.2*   HEMATOCRIT  37.9*  34.6*  34.0*   PLATELETCT  250  231  211       Recent Labs      09/11/17 0400 09/12/17 0310 09/13/17 0415   WBC  8.0  8.5  4.7*   NEUTSPOLYS  73.90*  77.80*  55.40   LYMPHOCYTES  19.50*  13.60*  27.60   MONOCYTES  4.90  6.50  11.60   EOSINOPHILS  1.00  1.20  4.20   BASOPHILS  0.30  0.50  0.80   ASTSGOT  32  22  26   ALTSGPT  20  18  21   ALKPHOSPHAT  121*  126*  129*   TBILIRUBIN  0.9  0.9  0.7     Problem List  1.  Status epilepticus  2.  Alcohol withdrawal.  3.  Aspiration pneumonia.  4.  Traumatic brain injury.  5.  Noncompliance with medication.  6.  History of right-sided hemiparesis.  7.  HTN  8.  Acute hypoxic respiratory failure    - extubated 9/12  9.  Hypokalemia  10. Traumatic catheter removal    Assessment: 62-year-old male  who presented with status epilepticus lkely secondary to alcohol withdrawal and history of TBI. CT head was negative for structural abnormalities to explain seizure activity on admission. Neurology has been consulted and recommended phenytoin and keppra and has signed off. He is on phenytoin and keppra per neurology recommendations. He was intubated for airway protection. Extubated yesterday and doing well on 2 liters nasal cannula. Hernandez catheter placed on 9/11 following traumatic catheter removal and should remain until 8/18 per urology. We can dc sedation, get a swallow leigh today. He is stable for transfer to the floor.    PLAN:  - cont  Keppra 500 mg b.i.d. & phenytoin 100 mg t.i.d. in IV form.  - Unasyn 3 g q. 6 hours for aspiration pneumonia X 7 days  - Follow up cultures  - DC sedation  - SLP  - PT/OT  - Pepcid  - Keep hernandez through 9/18 per urology  - Replace lytes  - Stable for transfer to the floor

## 2017-09-13 NOTE — CARE PLAN
Problem: Oxygenation:  Goal: Maintain adequate oxygenation dependent on patient condition    Intervention: Levels of oxygenation will improve to baseline  Post extubation pt pulling 2250 ml on IS. Pt is stable on 2L NC.

## 2017-09-13 NOTE — DIETARY
Nutrition Services: Brief Update    Cortrak has been removed and pt was started on a Clear Liquid diet. Per RN, pt to be seen by SLP today for diet recommendations. RD to monitor diet advancement and PO intake and make recommendations accordingly.

## 2017-09-13 NOTE — CARE PLAN
Problem: Communication  Goal: The ability to communicate needs accurately and effectively will improve    Intervention: Reorient patient to environment as needed  Reorient person PRN to environment, date and event.       Problem: Safety  Goal: Will remain free from injury    Intervention: Provide assistance with mobility  Pt. Up and out of bed with 2 person assist.

## 2017-09-13 NOTE — CARE PLAN
Problem: Infection  Goal: Will remain free from infection  Outcome: PROGRESSING AS EXPECTED    Intervention: Implement standard precautions and perform hand washing before and after patient contact  Hand hygiene performed before and after assessing patient. Daily CHD bath completed. Scrub the hub prior to accessing IVs. Linens changed daily and PRN when soiled.       Problem: Skin Integrity  Goal: Risk for impaired skin integrity will decrease  Outcome: PROGRESSING AS EXPECTED    Intervention: Implement precautions to protect skin integrity in collaboration with the interdisciplinary team  Patient turned Q2H. Pillows used for support and repositioning.Draw sheet used to decrease friction.    09/13/17 0800 09/13/17 1000   OTHER   Skin Preventative Measures --  Pillows in Use to Float Heels;Pillows in Use for Support / Positioning;Silicone Oxygen Tubing in Use   Bed Types --  Low Air Loss Mattress   Friction Interventions Draw Sheet / Pad Used for Repositioning --    Moisturizers Moisturizer  --    PT / OT Involved in Care Physical Therapy Involved;Occupational Therapy Involved --    Activity  Bed;Edge of bed;Chair;Range of motion --    Patient Turns / Repositioning --  Left Side;Patient Turns Self from Side to Side   Assistance / Tolerance for Turning/Repositioning --  Assistance of Two or More;Tolerates Well   Patient is Receiving Nutrition Oral Intake Adequate --    Nutrition Consult Ordered No, Consult has Already been Placed --    Vitamin Therapy in Use No --

## 2017-09-13 NOTE — PROGRESS NOTES
"Pulmonary Critical Care Progress Note        Date of Service:  9/13/2017    History of Present Illness: 62 y.o. male admitted on 9/9/2017 for status epilepticus requiring intubation and propofol drip     ROS: Review of Systems   Constitutional: Negative for chills and fever.   Respiratory: Negative for cough and shortness of breath.    Cardiovascular: Negative for chest pain and palpitations.   Gastrointestinal: Negative for abdominal pain, nausea and vomiting.   Neurological: Negative for seizures and headaches.       Interval Events:  24 hour interval history reviewed   Tm 99.5  +350cc over last 24hrs, -4.1L since admit  Cxr, per my read, extubated, no interval change otherwise  Extubated 9/12  2L NC IS 2250      Exam:  Blood pressure 125/84, pulse 89, temperature 36.9 °C (98.4 °F), resp. rate 16, height 1.778 m (5' 10\"), weight 93.7 kg (206 lb 9.1 oz), SpO2 97 %.  Gen: nad  Heent: nc, at  Cvs: rate wnl  Resp: ctab, no w/r/r  Abdo: s, nd, nt  Ext: no edema  Neuro: awake, following, non focal      PFSH:  No change.    Respiratory:     Pulse Oximetry: 97 %    ImagingAvailable data reviewed   Recent Labs      09/11/17   0435  09/12/17   0440   ISTATAPH  7.550*  7.579*   ISTATAPCO2  31.3  26.4   ISTATAPO2  58*  77   ISTATATCO2  28  25   [id number]  93  97   ISTATARTHCO3  27.4*  24.7   ISTATARTBE  5*  3   ISTATTEMP  37.7 C  97.0 F   ISTATFIO2  30  30   ISTATSPEC  Arterial  Arterial   ISTATAPHTC  7.539*  7.593*   [id number]  60*  73       HemoDynamics:  Pulse: 89, Heart Rate (Monitored): 86  NIBP: 137/80         Imaging: Available data reviewed        Neuro:  GCS Total Valmy Coma Score: 14         Imaging: Available data reviewed    Fluids:  Intake/Output       09/11/17 0700 - 09/12/17 0659 09/12/17 0700 - 09/13/17 0659 09/13/17 0700 - 09/14/17 0659      8382-9782 1434-4849 Total 5516-9318 3231-5478 Total 0700-1859 1900-0659 Total       Intake    P.O.  --  -- --  --  120 120  200  -- 200    P.O. -- -- -- -- 120 120 " 200 -- 200    I.V.  1139.2  100 1239.2  567  12 579  2  -- 2    Precedex Volume -- -- -- 558 -- 558 -- -- --    Propofol Volume 175.2 -- 175.2 -- -- -- -- -- --    IV Piggyback Volume (IVPB) 300 100 400 -- -- -- -- -- --     -- 664 -- -- -- -- -- --    IV Volume (Fentanyl) -- -- -- 9 12 21 2 -- 2    Other  3000  30 3030  330  -- 330  --  -- --    Medications (P.O./ Enteral Liquids) 100 30 130 330 -- 330 -- -- --    CBI Volume (CBI Intake) 2900 -- 2900 -- -- -- -- -- --    Enteral  690  950 1640  1080  -- 1080  --  -- --    Enteral Volume  660 -- 660 -- -- --    Free Water / Tube Flush 90 120 210 420 -- 420 -- -- --    PROTEIN 60 60 120 -- -- -- -- -- --    Total Intake 4829.2 1080 5909.2 7414 640 6621 202 -- 202       Output    Urine  4770  950 5720  850  900 1750  250  -- 250    Indwelling Cathether 9301 064 2092  250 -- 250    CBI Output (CBI Output) 2950 -- 2950 -- -- -- -- -- --    Stool/Urine  --  -- --  --  -- --  0  -- 0    Measurable Stool (ml) -- -- -- -- -- -- 0 -- 0    Stool  --  -- --  --  -- --  --  -- --    Number of Times Stooled -- -- -- -- -- -- 0 x -- 0 x    Total Output 4770 950 5720  250 -- 250       Net I/O     59.2 130 189.2 1127 -768 359 -48 -- -48        Weight: 93.7 kg (206 lb 9.1 oz)  Recent Labs      09/11/17   0400  09/12/17   0310  09/12/17   1315  09/13/17   0415   SODIUM  140  141  143  142   POTASSIUM  3.6  2.8*  3.2*  3.6   CHLORIDE  105  108  112  109   CO2  25  23  24  27   BUN  10  16  21  15   CREATININE  0.66  0.68  0.60  0.62   MAGNESIUM  2.2  2.0   --   2.1   PHOSPHORUS  3.2  3.5   --    --    CALCIUM  8.9  9.1  8.1*  8.9       GI/Nutrition:    Imaging: Available data reviewed  tube feed Tolerated  Liver Function  Recent Labs      09/11/17   0400  09/12/17   0310  09/12/17   1315  09/13/17   0415   ALTSGPT  20  18   --   21   ASTSGOT  32  22   --   26   ALKPHOSPHAT  121*  126*   --   129*   TBILIRUBIN  0.9  0.9   --   0.7   PREALBUMIN   13.0*   --    --    --    GLUCOSE  111*  143*  98  90       Heme:  Recent Labs      17   RBC  3.86*  3.61*  3.41*   HEMOGLOBIN  12.7*  11.7*  11.2*   HEMATOCRIT  37.9*  34.6*  34.0*   PLATELETCT  250  231  211       Infectious Disease:  Temp  Av °C (98.6 °F)  Min: 36.6 °C (97.8 °F)  Max: 37.5 °C (99.5 °F)  Micro: cultures reviewed  Recent Labs      17   WBC  8.0  8.5  4.7*   NEUTSPOLYS  73.90*  77.80*  55.40   LYMPHOCYTES  19.50*  13.60*  27.60   MONOCYTES  4.90  6.50  11.60   EOSINOPHILS  1.00  1.20  4.20   BASOPHILS  0.30  0.50  0.80   ASTSGOT  32  22  26   ALTSGPT  20  18  21   ALKPHOSPHAT  121*  126*  129*   TBILIRUBIN  0.9  0.9  0.7     Current Facility-Administered Medications   Medication Dose Frequency Provider Last Rate Last Dose   • fentaNYL (SUBLIMAZE) 50 mcg/mL in 50mL (Continuous Infusion)  25-50 mcg/hr Continuous Dheeraj Martin M.D. 1 mL/hr at 17 50 mcg/hr at 17   • fentaNYL (SUBLIMAZE) injection 25 mcg  25 mcg Q HOUR PRN Dheeraj Martin M.D.        Or   • fentaNYL (SUBLIMAZE) injection 50 mcg  50 mcg Q HOUR PRN Dheeraj Martin M.D.   50 mcg at 17 1113    Or   • fentaNYL (SUBLIMAZE) injection 100 mcg  100 mcg Q HOUR PRN Dheeraj Martin M.D.   100 mcg at 17 0429   • famotidine (PEPCID) tablet 20 mg  20 mg BID Jessica Nj, PharmD   20 mg at 17   • levetiracetam (KEPPRA) 100 MG/ML solution 500 mg  500 mg Q12HRS Jessica Nj, PharmD   500 mg at 17   • senna-docusate (PERICOLACE or SENOKOT S) 8.6-50 MG per tablet 2 Tab  2 Tab BID Mitch James M.D.   2 Tab at 17    And   • polyethylene glycol/lytes (MIRALAX) PACKET 1 Packet  1 Packet QDAY PRN Mitch James M.D.        And   • magnesium hydroxide (MILK OF MAGNESIA) suspension 30 mL  30 mL QDAY PRN Mitch James M.D.        And   • bisacodyl (DULCOLAX) suppository 10 mg  10 mg QDAY PRN  Mitch James M.D.       • Respiratory Care per Protocol   Continuous RT Mitch James M.D.       • labetalol (NORMODYNE,TRANDATE) injection 10 mg  10 mg Q4HRS PRN Mitch James M.D.       • glucose 4 g chewable tablet 16 g  16 g Q15 MIN PRN Mitch James M.D.        And   • dextrose 50% (D50W) injection 25 mL  25 mL Q15 MIN PRN Mitch James M.D.       • lorazepam (ATIVAN) injection 4 mg  4 mg Q10 MIN PRN Mitch James M.D.       • ampicillin/sulbactam (UNASYN) 3 g in  mL IVPB  3 g Q6HRS Mitch James M.D.   Stopped at 09/13/17 0601   • enoxaparin (LOVENOX) inj 40 mg  40 mg DAILY Mitch James M.D.   40 mg at 09/13/17 0806   • phenytoin (DILANTIN) injection 100 mg  100 mg Q8HRS Mitch James M.D.   100 mg at 09/13/17 0533   • lidocaine (XYLOCAINE) 1%  injection  1-2 mL Q30 MIN PRN Jessica Clark M.D.       • MD ALERT...Adult ICU Electrolyte Replacement per Pharmacy Protocol   pharmacy to dose Jessica Clark M.D.         Last reviewed on 9/9/2017  7:50 AM by Blessing Guardado, [facility]    Quality  Measures:  Medications reviewed, Labs reviewed and Radiology images reviewed                  Lines  Piv    Gtt  Dex off    Abx  Unasyn    keppra  Phenytoin     bcx 9/9 ngtd  scx 9/9 ngtd    Problems/Plan:  Acute Hypoxic Respiratory Failure   - due to status epilepticus and need for airway protection   - intubated on 9/9-12   - cont RT/O2 protocols   - monitor fluid balance   - bronch 9/11, f/u bal   - unasyn for total of 7 days  Status Epilepticus   - s/p dilantin load, level only at 9.1 on 9/9   - s/p keppra load and continue   - neuro following   - CT head negative for new pathology   - EEG 9/11 (-) for sz activity   Aspiration Pneumonia   - cont Unasyn for total 7 days   - follow blood and respiratory cultures  Hx of TBI with residual seizure activity   - cont dilantin/keppra    - CT head reviewed and no new pathology  Hx of Alcohol Abuse   - MVI/folate/thiamine  Hyponatremia   - improved  Traumatic Hematuria   - cbi      - hernandez to stay in until 9/18 per urology   Prophylaxis   - pepcid, enteral feedings, bowel protocol   - enoxaparin    Discussed patient condition and risk of morbidity and/or mortality with RN, RT, Pharmacy, , [employer] [facility] resident and neurology.

## 2017-09-14 PROBLEM — J69.0 ASPIRATION PNEUMONIA (HCC): Status: ACTIVE | Noted: 2017-09-14

## 2017-09-14 PROBLEM — F10.939 ALCOHOL WITHDRAWAL (HCC): Status: ACTIVE | Noted: 2017-09-14

## 2017-09-14 PROBLEM — G40.901 STATUS EPILEPTICUS (HCC): Status: ACTIVE | Noted: 2017-09-09

## 2017-09-14 PROBLEM — Z97.8 ENDOTRACHEALLY INTUBATED: Status: RESOLVED | Noted: 2017-09-09 | Resolved: 2017-09-14

## 2017-09-14 LAB
ALBUMIN SERPL BCP-MCNC: 3.5 G/DL (ref 3.2–4.9)
ALBUMIN/GLOB SERPL: 1.1 G/DL
ALP SERPL-CCNC: 127 U/L (ref 30–99)
ALT SERPL-CCNC: 31 U/L (ref 2–50)
ANION GAP SERPL CALC-SCNC: 7 MMOL/L (ref 0–11.9)
AST SERPL-CCNC: 35 U/L (ref 12–45)
BACTERIA BLD CULT: NORMAL
BACTERIA BLD CULT: NORMAL
BASOPHILS # BLD AUTO: 0.9 % (ref 0–1.8)
BASOPHILS # BLD: 0.04 K/UL (ref 0–0.12)
BILIRUB SERPL-MCNC: 0.7 MG/DL (ref 0.1–1.5)
BUN SERPL-MCNC: 9 MG/DL (ref 8–22)
CALCIUM SERPL-MCNC: 9.1 MG/DL (ref 8.5–10.5)
CHLORIDE SERPL-SCNC: 105 MMOL/L (ref 96–112)
CO2 SERPL-SCNC: 26 MMOL/L (ref 20–33)
CREAT SERPL-MCNC: 0.61 MG/DL (ref 0.5–1.4)
EOSINOPHIL # BLD AUTO: 0.25 K/UL (ref 0–0.51)
EOSINOPHIL NFR BLD: 5.9 % (ref 0–6.9)
ERYTHROCYTE [DISTWIDTH] IN BLOOD BY AUTOMATED COUNT: 51.4 FL (ref 35.9–50)
GFR SERPL CREATININE-BSD FRML MDRD: >60 ML/MIN/1.73 M 2
GLOBULIN SER CALC-MCNC: 3.1 G/DL (ref 1.9–3.5)
GLUCOSE SERPL-MCNC: 98 MG/DL (ref 65–99)
HCT VFR BLD AUTO: 34.5 % (ref 42–52)
HGB BLD-MCNC: 11.4 G/DL (ref 14–18)
IMM GRANULOCYTES # BLD AUTO: 0.02 K/UL (ref 0–0.11)
IMM GRANULOCYTES NFR BLD AUTO: 0.5 % (ref 0–0.9)
LYMPHOCYTES # BLD AUTO: 0.91 K/UL (ref 1–4.8)
LYMPHOCYTES NFR BLD: 21.3 % (ref 22–41)
MAGNESIUM SERPL-MCNC: 2.1 MG/DL (ref 1.5–2.5)
MCH RBC QN AUTO: 32.4 PG (ref 27–33)
MCHC RBC AUTO-ENTMCNC: 33 G/DL (ref 33.7–35.3)
MCV RBC AUTO: 98 FL (ref 81.4–97.8)
MONOCYTES # BLD AUTO: 0.45 K/UL (ref 0–0.85)
MONOCYTES NFR BLD AUTO: 10.5 % (ref 0–13.4)
NEUTROPHILS # BLD AUTO: 2.6 K/UL (ref 1.82–7.42)
NEUTROPHILS NFR BLD: 60.9 % (ref 44–72)
NRBC # BLD AUTO: 0 K/UL
NRBC BLD AUTO-RTO: 0 /100 WBC
PLATELET # BLD AUTO: 206 K/UL (ref 164–446)
PMV BLD AUTO: 8.5 FL (ref 9–12.9)
POTASSIUM SERPL-SCNC: 3.2 MMOL/L (ref 3.6–5.5)
PROT SERPL-MCNC: 6.6 G/DL (ref 6–8.2)
RBC # BLD AUTO: 3.52 M/UL (ref 4.7–6.1)
SIGNIFICANT IND 70042: NORMAL
SIGNIFICANT IND 70042: NORMAL
SITE SITE: NORMAL
SITE SITE: NORMAL
SODIUM SERPL-SCNC: 138 MMOL/L (ref 135–145)
SOURCE SOURCE: NORMAL
SOURCE SOURCE: NORMAL
WBC # BLD AUTO: 4.3 K/UL (ref 4.8–10.8)

## 2017-09-14 PROCEDURE — 700105 HCHG RX REV CODE 258

## 2017-09-14 PROCEDURE — 700102 HCHG RX REV CODE 250 W/ 637 OVERRIDE(OP): Performed by: PHARMACIST

## 2017-09-14 PROCEDURE — 770001 HCHG ROOM/CARE - MED/SURG/GYN PRIV*

## 2017-09-14 PROCEDURE — 700102 HCHG RX REV CODE 250 W/ 637 OVERRIDE(OP): Performed by: STUDENT IN AN ORGANIZED HEALTH CARE EDUCATION/TRAINING PROGRAM

## 2017-09-14 PROCEDURE — G8979 MOBILITY GOAL STATUS: HCPCS | Mod: CI

## 2017-09-14 PROCEDURE — 97161 PT EVAL LOW COMPLEX 20 MIN: CPT

## 2017-09-14 PROCEDURE — 700111 HCHG RX REV CODE 636 W/ 250 OVERRIDE (IP): Performed by: INTERNAL MEDICINE

## 2017-09-14 PROCEDURE — 97535 SELF CARE MNGMENT TRAINING: CPT

## 2017-09-14 PROCEDURE — 99233 SBSQ HOSP IP/OBS HIGH 50: CPT | Mod: GC | Performed by: INTERNAL MEDICINE

## 2017-09-14 PROCEDURE — 36415 COLL VENOUS BLD VENIPUNCTURE: CPT

## 2017-09-14 PROCEDURE — 700105 HCHG RX REV CODE 258: Performed by: INTERNAL MEDICINE

## 2017-09-14 PROCEDURE — A9270 NON-COVERED ITEM OR SERVICE: HCPCS | Performed by: STUDENT IN AN ORGANIZED HEALTH CARE EDUCATION/TRAINING PROGRAM

## 2017-09-14 PROCEDURE — A9270 NON-COVERED ITEM OR SERVICE: HCPCS | Performed by: INTERNAL MEDICINE

## 2017-09-14 PROCEDURE — 85025 COMPLETE CBC W/AUTO DIFF WBC: CPT

## 2017-09-14 PROCEDURE — 700102 HCHG RX REV CODE 250 W/ 637 OVERRIDE(OP): Performed by: INTERNAL MEDICINE

## 2017-09-14 PROCEDURE — 80053 COMPREHEN METABOLIC PANEL: CPT

## 2017-09-14 PROCEDURE — G8978 MOBILITY CURRENT STATUS: HCPCS | Mod: CJ

## 2017-09-14 PROCEDURE — 92526 ORAL FUNCTION THERAPY: CPT

## 2017-09-14 PROCEDURE — A9270 NON-COVERED ITEM OR SERVICE: HCPCS | Performed by: PHARMACIST

## 2017-09-14 PROCEDURE — 83735 ASSAY OF MAGNESIUM: CPT

## 2017-09-14 RX ORDER — PHENYTOIN 125 MG/5ML
100 SUSPENSION ORAL EVERY 8 HOURS
Status: DISCONTINUED | OUTPATIENT
Start: 2017-09-14 | End: 2017-09-20 | Stop reason: HOSPADM

## 2017-09-14 RX ORDER — POTASSIUM CHLORIDE 20 MEQ/1
40 TABLET, EXTENDED RELEASE ORAL DAILY
Status: DISCONTINUED | OUTPATIENT
Start: 2017-09-14 | End: 2017-09-15

## 2017-09-14 RX ORDER — SODIUM CHLORIDE 9 MG/ML
INJECTION, SOLUTION INTRAVENOUS
Status: COMPLETED
Start: 2017-09-14 | End: 2017-09-14

## 2017-09-14 RX ADMIN — AMPICILLIN SODIUM AND SULBACTAM SODIUM 3 G: 2; 1 INJECTION, POWDER, FOR SOLUTION INTRAMUSCULAR; INTRAVENOUS at 17:43

## 2017-09-14 RX ADMIN — ENOXAPARIN SODIUM 40 MG: 100 INJECTION SUBCUTANEOUS at 08:57

## 2017-09-14 RX ADMIN — STANDARDIZED SENNA CONCENTRATE AND DOCUSATE SODIUM 2 TABLET: 8.6; 5 TABLET, FILM COATED ORAL at 08:57

## 2017-09-14 RX ADMIN — LEVETIRACETAM 500 MG: 100 SOLUTION ORAL at 14:09

## 2017-09-14 RX ADMIN — SODIUM CHLORIDE 500 ML: 9 INJECTION, SOLUTION INTRAVENOUS at 00:22

## 2017-09-14 RX ADMIN — FAMOTIDINE 20 MG: 20 TABLET, FILM COATED ORAL at 20:51

## 2017-09-14 RX ADMIN — AMPICILLIN SODIUM AND SULBACTAM SODIUM 3 G: 2; 1 INJECTION, POWDER, FOR SOLUTION INTRAMUSCULAR; INTRAVENOUS at 00:07

## 2017-09-14 RX ADMIN — STANDARDIZED SENNA CONCENTRATE AND DOCUSATE SODIUM 2 TABLET: 8.6; 5 TABLET, FILM COATED ORAL at 20:50

## 2017-09-14 RX ADMIN — PHENYTOIN SODIUM 100 MG: 50 INJECTION INTRAMUSCULAR; INTRAVENOUS at 05:23

## 2017-09-14 RX ADMIN — POTASSIUM CHLORIDE 40 MEQ: 1500 TABLET, EXTENDED RELEASE ORAL at 08:56

## 2017-09-14 RX ADMIN — PHENYTOIN 100 MG: 125 SUSPENSION ORAL at 17:46

## 2017-09-14 RX ADMIN — AMPICILLIN SODIUM AND SULBACTAM SODIUM 3 G: 2; 1 INJECTION, POWDER, FOR SOLUTION INTRAMUSCULAR; INTRAVENOUS at 14:09

## 2017-09-14 RX ADMIN — FAMOTIDINE 20 MG: 20 TABLET, FILM COATED ORAL at 08:57

## 2017-09-14 RX ADMIN — PHENYTOIN 100 MG: 125 SUSPENSION ORAL at 20:50

## 2017-09-14 ASSESSMENT — ENCOUNTER SYMPTOMS
COUGH: 1
SPUTUM PRODUCTION: 0
MUSCULOSKELETAL NEGATIVE: 1
DIARRHEA: 0
FEVER: 0
ABDOMINAL PAIN: 0
TREMORS: 0
PALPITATIONS: 0
NAUSEA: 0
VOMITING: 0
CHILLS: 0
HEARTBURN: 0

## 2017-09-14 ASSESSMENT — GAIT ASSESSMENTS
ASSISTIVE DEVICE: FRONT WHEEL WALKER
GAIT LEVEL OF ASSIST: CONTACT GUARD ASSIST
DEVIATION: OTHER (COMMENT)
DISTANCE (FEET): 150

## 2017-09-14 ASSESSMENT — COGNITIVE AND FUNCTIONAL STATUS - GENERAL
PERSONAL GROOMING: A LITTLE
SUGGESTED CMS G CODE MODIFIER DAILY ACTIVITY: CK
STANDING UP FROM CHAIR USING ARMS: A LITTLE
CLIMB 3 TO 5 STEPS WITH RAILING: A LITTLE
WALKING IN HOSPITAL ROOM: A LITTLE
MOBILITY SCORE: 21
DRESSING REGULAR UPPER BODY CLOTHING: A LITTLE
TOILETING: A LOT
EATING MEALS: A LITTLE
HELP NEEDED FOR BATHING: A LOT
DAILY ACTIVITIY SCORE: 15
DRESSING REGULAR LOWER BODY CLOTHING: A LOT
SUGGESTED CMS G CODE MODIFIER MOBILITY: CJ

## 2017-09-14 ASSESSMENT — PATIENT HEALTH QUESTIONNAIRE - PHQ9
1. LITTLE INTEREST OR PLEASURE IN DOING THINGS: NOT AT ALL
SUM OF ALL RESPONSES TO PHQ QUESTIONS 1-9: 0
2. FEELING DOWN, DEPRESSED, IRRITABLE, OR HOPELESS: NOT AT ALL
SUM OF ALL RESPONSES TO PHQ9 QUESTIONS 1 AND 2: 0

## 2017-09-14 ASSESSMENT — PAIN SCALES - GENERAL
PAINLEVEL_OUTOF10: 0
PAINLEVEL_OUTOF10: 0

## 2017-09-14 NOTE — PROGRESS NOTES
Received patient from ICU via stretcher awake, alert to self only.  Benson to gravity in place draining yellow, clear urine.  IV to left wrist, patent, clean, dry.  Oxygen 2 liters via nasal cannula in use.  Bed alarm activated, seizure precautions in place.  Call light within reach.

## 2017-09-14 NOTE — ASSESSMENT & PLAN NOTE
Patient is at high risk for aspiration pna with dysphagia, history of alcohol abuse, and seizures. 9/11 broncheoalveolar culture grew few Gram negative rods x1.  Patient has not had leukocytosis or clinical signs of infection. CXRs show some R lung base atelectasis.   - S/p 7 day course unasyn (last dose this am 9/16)  - Continue working with SLP

## 2017-09-14 NOTE — THERAPY
"Physical Therapy Evaluation completed.   Bed Mobility:  Supine to Sit: Modified Independent  Transfers: Sit to Stand: Contact Guard Assist  Gait: Level Of Assist: Contact Guard Assist (close guarding 2' to impulsivity) with Front-Wheel Walker       Plan of Care: Will benefit from Physical Therapy 2 times per week  Discharge Recommendations: Equipment: Will Continue to Assess for Equipment Needs. See below    Pt presents to PT for risk reduction for LOB and falling. He is able to demonstrate hallway ambulation with FWW with CGA with no danny LOB. He does consistently need VC/TC and CGA for attention to environment and general safety awareness. Anticipate that as cognition clears pt will progress quickly with regards to activity. His balance deficits are exacerbated 2' to his current cognitive impairements > funcitonal impairements. He will benefit from continued skilled acute PT, though anticipate that pt has more OT and SLP needs at this time and would defer d/c recs to these disciplines (as primary concerns would be regarding safety with IADLs and ADLs and cognitive tasks). If current cognition is baseline, anticipate that pt would require long term care and need Spv for OOB activities and assist with IADls and ADLs. Will continue to follow.     See \"Rehab Therapy-Acute\" Patient Summary Report for complete documentation.     "

## 2017-09-14 NOTE — CARE PLAN
Problem: Communication  Goal: The ability to communicate needs accurately and effectively will improve  Outcome: PROGRESSING AS EXPECTED      Problem: Safety  Goal: Will remain free from falls  Outcome: PROGRESSING AS EXPECTED      Problem: Infection  Goal: Will remain free from infection  Outcome: PROGRESSING AS EXPECTED

## 2017-09-14 NOTE — THERAPY
"Speech Language Therapy dysphagia treatment completed.   Functional Status:  Pt was AAOx2 upon entering, and agreed to therapy. He continues to exhibit confusion, perseverating on need to take shower and buy pants. Pt is currently in restraints, and is unable to state that he is in a hospital, despite max cues. Pt exhibited slight right deviation of tongue upon protrusion. He laughed and shook his head when cued to complete other oral mech tasks. Pt completed PO trials of NTL via tsp and side of cup, and puree with no overt s/sx of aspiration. Upon palpation, initiation of swallow was delayed 2-5 seconds. Pt swallowed twice to clear bolus with mod cues from SLP. Pt completed 2 trials of single piece of fruit, with excessive chewing and delayed initiation of swallow. Pt exhibited choking/coughing immediately post 2nd swallow, with oral residue noted. Pt politely decline further PO trials. Swallowing exercises were completed: 10/10 lingual sweeps and 10/10 velar sounds with \"good\" overall accuracy. Pt attempted Laurie, but was unable to follow commands despite max cues. Pt noted to be coughing several minutes after dysphagia therapy was concluded.  CXR (9/13) shows no change from 9/12: \"There is stable bibasilar atelectasis. There is no pleural effusion.\" Pt with immediate coughing on soft solids, and delayed coughing post PO trials. He continues to exhibit altered mentation and is unsafe to independently consume NTL snacks.    Recommendations:  Recommend that the pt continue 2 NTFL items per meal with STRICT 1:1 feeding from nursing. Consider high calorie NTL snacks to assist pt in meeting nutritional needs. High f/u; SLP to follow closely to determine safest oral diet vs possible need for alternative means of nutrition.    Plan of Care: Will benefit from Speech Therapy 3 times per week  Post-Acute Therapy: Currently anticipate no further skilled therapy needs once patient is discharged from the inpatient " "setting.    See \"Rehab Therapy-Acute\" Patient Summary Report for complete documentation.     "

## 2017-09-14 NOTE — CARE PLAN
Problem: Safety  Goal: Will remain free from falls  Outcome: PROGRESSING AS EXPECTED  Safety and fall precautions in place.  Bed in low position, upper side rails X 2, treaded socks applied.  Hourly rounding, bed alarm activated.  Call light within reach.    Problem: Skin Integrity  Goal: Risk for impaired skin integrity will decrease  Outcome: PROGRESSING AS EXPECTED  Patient is turning and repositioning self.  Extra pillows provided for comfort.

## 2017-09-14 NOTE — PROGRESS NOTES
Two RN's skin assessment complete.  Right knee abrasion, right ankle dark spot, no drainage noted.

## 2017-09-14 NOTE — PROGRESS NOTES
Internal Medicine Interval Note    Name Crescencio Eller     1955   Age/Sex 62 y.o. male   MRN [id number]   Code Status Full      After 5PM or if no immediate response to page, please call for cross-coverage  Attending/Team: Dr. Karimi/Hua See Patient List for primary contact information  Call [phone] to page    1st Call - Day Intern (R1):   Dr. Valenzuela 2nd Call - Day Sr. Resident (R2/R3):   Dr. Cavazos         Reason for interval visit  (Principal Problem)   Status epilepticus (CMS-[facility])    Interval Problem Daily Status Update  (24 hours)     Pt seen and examined at bedside. In good spirits. No acute distress. As per nurses, patient was combative overnight and pulls on his wires. He is in need of restraints. Illogical at times, speaks about his childhood and [place] in response to several questions. Pt denies CP/SOB. No fevers/chills. No seizures reported. Unable to tolerate PO intake due to difficulty swallowing/choking/coughing.     OT suggested that patient would benefit from acute skilled serives and would recommend post acute skilled services prior to d/c home. PT feels the patient requires more OT and SLP needs at this time and will continue to follow patient.     Review of Systems   Constitutional: Negative for chills and fever.   Respiratory: Positive for cough. Negative for sputum production.    Cardiovascular: Negative for chest pain, palpitations and leg swelling.   Gastrointestinal: Negative for abdominal pain, diarrhea, heartburn, nausea and vomiting.   Genitourinary: Negative for dysuria.   Musculoskeletal: Negative.    Skin: Negative for itching.   Neurological: Negative for tremors.      Unable to reliably assess.       Consultants/Specialty  PMA    Disposition  Inpatient    Quality Measures  Quality-Core Measures  Reviewed items::  EKG reviewed, Labs reviewed, Medications reviewed and Radiology images reviewed  Benson catheter::  Critically Ill - Requiring  Accurate Measurement of Urinary Output  DVT prophylaxis pharmacological::  Enoxaparin (Lovenox)         Physical Exam       Vitals:    09/14/17 0000 09/14/17 0400 09/14/17 0800 09/14/17 1200   BP: 151/88 138/91 144/92 123/80   Pulse: 81 96 99 87   Resp: 16 16 18 20   Temp: 36.8 °C (98.3 °F) 36.2 °C (97.2 °F) 36.6 °C (97.8 °F) 36.4 °C (97.5 °F)   SpO2:  95% 93%    Weight:       Height:         Body mass index is 29.64 kg/m².    Oxygen Therapy:  Pulse Oximetry: 93 %, O2 (LPM): 2, O2 Delivery: Silicone Nasal Cannula (removed for ambulation)    Physical Exam  Constitutional: No acute distress  HENT:   Head: Normocephalic and atraumatic.   Eyes: Pupils are equal, round, and reactive to light. Right eye exhibits no discharge. No scleral icterus.   Neck: No JVD present.   Cardiovascular: RRR. No m/r/g.    Pulmonary/Chest: No respiratory distress.   Musculoskeletal: He exhibits no edema or tenderness.   Skin: Skin is warm and dry.    Lab Data Review:       9/14/2017  2:42 PM    Recent Labs      09/12/17 0310 09/12/17 1315 09/13/17 0415 09/14/17   0510   SODIUM  141  143  142  138   POTASSIUM  2.8*  3.2*  3.6  3.2*   CHLORIDE  108  112  109  105   CO2  23  24  27  26   BUN  16  21  15  9   CREATININE  0.68  0.60  0.62  0.61   MAGNESIUM  2.0   --   2.1  2.1   PHOSPHORUS  3.5   --    --    --    CALCIUM  9.1  8.1*  8.9  9.1       Recent Labs      09/12/17 0310 09/12/17   1315  09/13/17   0415  09/14/17   0510   ALTSGPT  18   --   21  31   ASTSGOT  22   --   26  35   ALKPHOSPHAT  126*   --   129*  127*   TBILIRUBIN  0.9   --   0.7  0.7   GLUCOSE  143*  98  90  98       Recent Labs      09/12/17 0310 09/13/17 0415  09/14/17   0511   RBC  3.61*  3.41*  3.52*   HEMOGLOBIN  11.7*  11.2*  11.4*   HEMATOCRIT  34.6*  34.0*  34.5*   PLATELETCT  231  211  206       Recent Labs      09/12/17   0310  09/13/17   0415  09/14/17   0510  09/14/17   0511   WBC  8.5  4.7*   --   4.3*   NEUTSPOLYS  77.80*  55.40   --   60.90    LYMPHOCYTES  13.60*  27.60   --   21.30*   MONOCYTES  6.50  11.60   --   10.50   EOSINOPHILS  1.20  4.20   --   5.90   BASOPHILS  0.50  0.80   --   0.90   ASTSGOT  22  26  35   --    ALTSGPT  18  21  31   --    ALKPHOSPHAT  126*  129*  127*   --    TBILIRUBIN  0.9  0.7  0.7   --            Assessment/Plan     * Status epilepticus (CMS-HCC)   Assessment & Plan    Patient is clinically stable since transfer from ICU.   - cont Keppra 500 mg b.i.d. & phenytoin 100 mg t.i.d. in IV form.  - Pepcid   - Keep hernandez through 9/18 per urology  - Replace lytes          Aspiration pneumonia (CMS-HCC)   Assessment & Plan    Patient is at high risk for aspiration pna with dysphagia, history of alcohol abuse, and seizures. 9/11 broncheoalveolar culture grew few Gram negative rods x1.  Patient has not had leukocytosis or clinical signs of infection. CXRs show some R lung base atelectasis.   - Continue unasyn to complete 7 day course tomorrow  - Continue working with SLP         Alcohol withdrawal (CMS-HCC)   Assessment & Plan    Patient has a history of alcohol abuse. Patient reports drinking prior to presentation. Seizure likely 2/2 alcohol withdrawal.   -[facility] Protocol

## 2017-09-14 NOTE — THERAPY
"Occupational Therapy Treatment completed with focus on ADLs, ADL transfers and upper extremity function.  Functional Status:  Pt seen for OT tx, performed bed mobility with cga from a raised hob, ambulated to BR and back with cga, min a to don/doff hospital gown 2/2 line management, toileting task mod a, assists with pericare but requires assistance for thoroughness, cga/min a for functional t/f for eccentric control for STS. Pt was able to follow 1-step commands consistently occasionally delay noted, pleasant and cooperative of session. Would continue to benefit from acute skilled serives and would recommend post acute skilled services prior to d/c home.   Plan of Care: Will benefit from Occupational Therapy 3 times per week  Discharge Recommendations:  Equipment Will Continue to Assess for Equipment Needs. Post-acute therapy Discharge to a transitional care facility for continued skilled therapy services.    See \"Rehab Therapy-Acute\" Patient Summary Report for complete documentation.   "

## 2017-09-14 NOTE — ASSESSMENT & PLAN NOTE
"Patient has a history of alcohol abuse. Patient reports drinking prior to presentation. Seizure likely 2/2 alcohol withdrawal. Pt's confusion persists, unable to determine baseline. Sister Daisy unable to provide clear details regarding baseline as he is \"always drunk.\"  -Not agitated anymore, restraints have been removed 9/17/2017   -Mental status mostly at his baseline  -Attempting to treat potential reversible cause of confusion (Wernicke's) with high dose thiamine   -CTM  "

## 2017-09-14 NOTE — ASSESSMENT & PLAN NOTE
No seizures overnight   Cont Keppra 500 mg b.i.d. & phenytoin 100 mg t.i.d. in PO form.  Keep hernandez through 9/18 per urology  Replacing dhruv

## 2017-09-15 LAB
ALBUMIN SERPL BCP-MCNC: 3.3 G/DL (ref 3.2–4.9)
ALBUMIN/GLOB SERPL: 1.1 G/DL
ALP SERPL-CCNC: 130 U/L (ref 30–99)
ALT SERPL-CCNC: 28 U/L (ref 2–50)
ANION GAP SERPL CALC-SCNC: 9 MMOL/L (ref 0–11.9)
AST SERPL-CCNC: 29 U/L (ref 12–45)
BASOPHILS # BLD AUTO: 1 % (ref 0–1.8)
BASOPHILS # BLD: 0.03 K/UL (ref 0–0.12)
BILIRUB SERPL-MCNC: 0.5 MG/DL (ref 0.1–1.5)
BUN SERPL-MCNC: 7 MG/DL (ref 8–22)
CALCIUM SERPL-MCNC: 8.7 MG/DL (ref 8.5–10.5)
CHLORIDE SERPL-SCNC: 107 MMOL/L (ref 96–112)
CO2 SERPL-SCNC: 26 MMOL/L (ref 20–33)
CREAT SERPL-MCNC: 0.57 MG/DL (ref 0.5–1.4)
EOSINOPHIL # BLD AUTO: 0.23 K/UL (ref 0–0.51)
EOSINOPHIL NFR BLD: 7.6 % (ref 0–6.9)
ERYTHROCYTE [DISTWIDTH] IN BLOOD BY AUTOMATED COUNT: 52.1 FL (ref 35.9–50)
GFR SERPL CREATININE-BSD FRML MDRD: >60 ML/MIN/1.73 M 2
GLOBULIN SER CALC-MCNC: 3 G/DL (ref 1.9–3.5)
GLUCOSE SERPL-MCNC: 86 MG/DL (ref 65–99)
HCT VFR BLD AUTO: 35.9 % (ref 42–52)
HGB BLD-MCNC: 12 G/DL (ref 14–18)
IMM GRANULOCYTES # BLD AUTO: 0.02 K/UL (ref 0–0.11)
IMM GRANULOCYTES NFR BLD AUTO: 0.7 % (ref 0–0.9)
LYMPHOCYTES # BLD AUTO: 0.91 K/UL (ref 1–4.8)
LYMPHOCYTES NFR BLD: 29.9 % (ref 22–41)
MAGNESIUM SERPL-MCNC: 2.2 MG/DL (ref 1.5–2.5)
MCH RBC QN AUTO: 33.2 PG (ref 27–33)
MCHC RBC AUTO-ENTMCNC: 33.4 G/DL (ref 33.7–35.3)
MCV RBC AUTO: 99.4 FL (ref 81.4–97.8)
MONOCYTES # BLD AUTO: 0.46 K/UL (ref 0–0.85)
MONOCYTES NFR BLD AUTO: 15.1 % (ref 0–13.4)
NEUTROPHILS # BLD AUTO: 1.39 K/UL (ref 1.82–7.42)
NEUTROPHILS NFR BLD: 45.7 % (ref 44–72)
NRBC # BLD AUTO: 0 K/UL
NRBC BLD AUTO-RTO: 0 /100 WBC
PLATELET # BLD AUTO: 192 K/UL (ref 164–446)
PMV BLD AUTO: 8.3 FL (ref 9–12.9)
POTASSIUM SERPL-SCNC: 3 MMOL/L (ref 3.6–5.5)
PROT SERPL-MCNC: 6.3 G/DL (ref 6–8.2)
RBC # BLD AUTO: 3.61 M/UL (ref 4.7–6.1)
SODIUM SERPL-SCNC: 142 MMOL/L (ref 135–145)
WBC # BLD AUTO: 3 K/UL (ref 4.8–10.8)

## 2017-09-15 PROCEDURE — 99232 SBSQ HOSP IP/OBS MODERATE 35: CPT | Mod: GC | Performed by: INTERNAL MEDICINE

## 2017-09-15 PROCEDURE — 85025 COMPLETE CBC W/AUTO DIFF WBC: CPT

## 2017-09-15 PROCEDURE — 92526 ORAL FUNCTION THERAPY: CPT

## 2017-09-15 PROCEDURE — 700105 HCHG RX REV CODE 258: Performed by: INTERNAL MEDICINE

## 2017-09-15 PROCEDURE — A9270 NON-COVERED ITEM OR SERVICE: HCPCS | Performed by: PHARMACIST

## 2017-09-15 PROCEDURE — 700102 HCHG RX REV CODE 250 W/ 637 OVERRIDE(OP): Performed by: PHARMACIST

## 2017-09-15 PROCEDURE — A9270 NON-COVERED ITEM OR SERVICE: HCPCS | Performed by: INTERNAL MEDICINE

## 2017-09-15 PROCEDURE — 83735 ASSAY OF MAGNESIUM: CPT

## 2017-09-15 PROCEDURE — 80053 COMPREHEN METABOLIC PANEL: CPT

## 2017-09-15 PROCEDURE — 700105 HCHG RX REV CODE 258: Performed by: STUDENT IN AN ORGANIZED HEALTH CARE EDUCATION/TRAINING PROGRAM

## 2017-09-15 PROCEDURE — 770001 HCHG ROOM/CARE - MED/SURG/GYN PRIV*

## 2017-09-15 PROCEDURE — 36415 COLL VENOUS BLD VENIPUNCTURE: CPT

## 2017-09-15 PROCEDURE — 700102 HCHG RX REV CODE 250 W/ 637 OVERRIDE(OP): Performed by: STUDENT IN AN ORGANIZED HEALTH CARE EDUCATION/TRAINING PROGRAM

## 2017-09-15 PROCEDURE — 700111 HCHG RX REV CODE 636 W/ 250 OVERRIDE (IP): Performed by: INTERNAL MEDICINE

## 2017-09-15 PROCEDURE — 700102 HCHG RX REV CODE 250 W/ 637 OVERRIDE(OP): Performed by: INTERNAL MEDICINE

## 2017-09-15 PROCEDURE — A9270 NON-COVERED ITEM OR SERVICE: HCPCS | Performed by: STUDENT IN AN ORGANIZED HEALTH CARE EDUCATION/TRAINING PROGRAM

## 2017-09-15 PROCEDURE — 700111 HCHG RX REV CODE 636 W/ 250 OVERRIDE (IP): Performed by: STUDENT IN AN ORGANIZED HEALTH CARE EDUCATION/TRAINING PROGRAM

## 2017-09-15 RX ORDER — POTASSIUM CHLORIDE 20 MEQ/1
40 TABLET, EXTENDED RELEASE ORAL DAILY
Status: DISCONTINUED | OUTPATIENT
Start: 2017-09-15 | End: 2017-09-16

## 2017-09-15 RX ADMIN — PHENYTOIN 100 MG: 125 SUSPENSION ORAL at 13:26

## 2017-09-15 RX ADMIN — AMPICILLIN SODIUM AND SULBACTAM SODIUM 3 G: 2; 1 INJECTION, POWDER, FOR SOLUTION INTRAMUSCULAR; INTRAVENOUS at 06:06

## 2017-09-15 RX ADMIN — PHENYTOIN 100 MG: 125 SUSPENSION ORAL at 22:08

## 2017-09-15 RX ADMIN — PHENYTOIN 100 MG: 125 SUSPENSION ORAL at 06:07

## 2017-09-15 RX ADMIN — LEVETIRACETAM 500 MG: 100 SOLUTION ORAL at 00:13

## 2017-09-15 RX ADMIN — THIAMINE HYDROCHLORIDE 100 MG: 100 INJECTION, SOLUTION INTRAMUSCULAR; INTRAVENOUS at 14:10

## 2017-09-15 RX ADMIN — LEVETIRACETAM 500 MG: 100 SOLUTION ORAL at 22:08

## 2017-09-15 RX ADMIN — LEVETIRACETAM 500 MG: 100 SOLUTION ORAL at 10:21

## 2017-09-15 RX ADMIN — AMPICILLIN SODIUM AND SULBACTAM SODIUM 3 G: 2; 1 INJECTION, POWDER, FOR SOLUTION INTRAMUSCULAR; INTRAVENOUS at 18:06

## 2017-09-15 RX ADMIN — THIAMINE HYDROCHLORIDE 500 MG: 100 INJECTION, SOLUTION INTRAMUSCULAR; INTRAVENOUS at 15:15

## 2017-09-15 RX ADMIN — AMPICILLIN SODIUM AND SULBACTAM SODIUM 3 G: 2; 1 INJECTION, POWDER, FOR SOLUTION INTRAMUSCULAR; INTRAVENOUS at 13:00

## 2017-09-15 RX ADMIN — POTASSIUM CHLORIDE 40 MEQ: 1500 TABLET, EXTENDED RELEASE ORAL at 10:21

## 2017-09-15 RX ADMIN — FAMOTIDINE 20 MG: 20 TABLET, FILM COATED ORAL at 10:21

## 2017-09-15 RX ADMIN — ENOXAPARIN SODIUM 40 MG: 100 INJECTION SUBCUTANEOUS at 10:21

## 2017-09-15 RX ADMIN — FAMOTIDINE 20 MG: 20 TABLET, FILM COATED ORAL at 22:07

## 2017-09-15 RX ADMIN — THIAMINE HYDROCHLORIDE 500 MG: 100 INJECTION, SOLUTION INTRAMUSCULAR; INTRAVENOUS at 22:07

## 2017-09-15 RX ADMIN — AMPICILLIN SODIUM AND SULBACTAM SODIUM 3 G: 2; 1 INJECTION, POWDER, FOR SOLUTION INTRAMUSCULAR; INTRAVENOUS at 00:12

## 2017-09-15 RX ADMIN — STANDARDIZED SENNA CONCENTRATE AND DOCUSATE SODIUM 2 TABLET: 8.6; 5 TABLET, FILM COATED ORAL at 10:21

## 2017-09-15 ASSESSMENT — PAIN SCALES - GENERAL
PAINLEVEL_OUTOF10: 0

## 2017-09-15 NOTE — CARE PLAN
Problem: Nutritional:  Goal: Nutrition support tolerated and meeting greater than 85% of estimated needs  Outcome: MET Date Met: 09/15/17  TF now discontinued s/p pt starting PO diet per SLP (two items per meal tray only).   Goal: Achieve adequate nutritional intake  Patient will consume 50% of meals   Outcome: MET Date Met: 09/15/17

## 2017-09-15 NOTE — PROGRESS NOTES
Recd report, assumed care. Pt confused, denies pain. VSS. Assessment complete. Restraints in place. No family at bedside. Fall precautions in place, call light in reach, white board updated, hourly rounding in place. Will continue to monitor.

## 2017-09-15 NOTE — DISCHARGE PLANNING
PMR order received from Dr. Valenzuela.  Will need to look into D/C resources/support.  Restraints are a barrier to RIRF. Dr. Wild to consult.  I do appreciate the referral.

## 2017-09-15 NOTE — CARE PLAN
Problem: Safety  Goal: Will remain free from injury  Fall precautions in place, call light in reach, white board updated, hourly rounding in place.    Restraints on, skin assessed.    Problem: Venous Thromboembolism (VTW)/Deep Vein Thrombosis (DVT) Prevention:  Goal: Patient will participate in Venous Thrombosis (VTE)/Deep Vein Thrombosis (DVT)Prevention Measures  SCDS on

## 2017-09-15 NOTE — PROGRESS NOTES
Internal Medicine Interval Note    Name Crescencio Eller     1955   Age/Sex 62 y.o. male   MRN [id number]   Code Status FULL     After 5PM or if no immediate response to page, please call for cross-coverage  Attending/Team: Dr. Karimi/Hua See Patient List for primary contact information  Call [phone] to page    1st Call - Day Intern (R1):   Dr. Valenzuela 2nd Call - Day Sr. Resident (R2/R3):   Dr. Cavazos         Reason for interval visit  (Principal Problem)   Status epilepticus (CMS-[facility])    Interval Problem Daily Status Update  (24 hours)   Pt seen and examined at bedside. No acute events overnight. Continues to have soft wrist restraints, as requested by RNs to prevent the patient from pulling out his hernandez. Pt is still confused and does not respond appropriately to most questions. He denies any pain. He denies CP/SOB.     Pt likely needs to be d/c'ed to a transitional care facility for continued skilled therapy services. SNF referral placed. Will need to touch base with SW.    ROS  Unable to reliably assess.  Pt denies CP/SOB. No fevers/Chills. Pt endorses cough, unchanged from yesterday. No abdominal pain.    Consultants/Specialty  PMA    Disposition  Inpatient     Quality Measures  Quality-Core Measures  Reviewed items::  EKG reviewed, Labs reviewed, Medications reviewed and Radiology images reviewed  Hernandez catheter::  Critically Ill - Requiring Accurate Measurement of Urinary Output  DVT prophylaxis pharmacological::  Enoxaparin (Lovenox)    Physical Exam       Vitals:    17 2000 09/15/17 0000 09/15/17 0400 09/15/17 0755   BP: 124/83 130/80 134/86 112/77   Pulse: 92 99 85 87   Resp: 16 16 16 16   Temp: 36.4 °C (97.5 °F) 36.5 °C (97.7 °F) 37.1 °C (98.8 °F) 36.1 °C (96.9 °F)   SpO2: 97% 93% 96% 94%   Weight:       Height:         Body mass index is 29.64 kg/m².    Oxygen Therapy:  Pulse Oximetry: 94 %, O2 (LPM): 1, O2 Delivery: Silicone Nasal Cannula    Physical Exam  Constitutional:  Unchanged from yesterday. No acute distress. Talkative.  HENT:   Head: Normocephalic and atraumatic.   Eyes: Pupils are equal, round, and reactive to light. Right eye exhibits no discharge. No scleral icterus.   Neck: No JVD present.   Cardiovascular: RRR. No m/r/g.    Pulmonary/Chest: No respiratory distress.   Musculoskeletal: He exhibits no edema or tenderness.   Skin: Skin is warm and dry.  Neuro: Inappropriately responds to most questions. Confused.     Lab Data Review:       9/15/2017  7:51 AM    Recent Labs      09/13/17 0415  09/14/17   0510  09/15/17   0525   SODIUM  142  138  142   POTASSIUM  3.6  3.2*  3.0*   CHLORIDE  109  105  107   CO2  27  26  26   BUN  15  9  7*   CREATININE  0.62  0.61  0.57   MAGNESIUM  2.1  2.1  2.2   CALCIUM  8.9  9.1  8.7       Recent Labs      09/13/17 0415  09/14/17   0510  09/15/17   0525   ALTSGPT  21  31  28   ASTSGOT  26  35  29   ALKPHOSPHAT  129*  127*  130*   TBILIRUBIN  0.7  0.7  0.5   GLUCOSE  90  98  86       Recent Labs      09/13/17 0415 09/14/17   0511  09/15/17   0525   RBC  3.41*  3.52*  3.61*   HEMOGLOBIN  11.2*  11.4*  12.0*   HEMATOCRIT  34.0*  34.5*  35.9*   PLATELETCT  211  206  192       Recent Labs      09/13/17 0415  09/14/17   0510  09/14/17   0511  09/15/17   0525   WBC  4.7*   --   4.3*  3.0*   NEUTSPOLYS  55.40   --   60.90  45.70   LYMPHOCYTES  27.60   --   21.30*  29.90   MONOCYTES  11.60   --   10.50  15.10*   EOSINOPHILS  4.20   --   5.90  7.60*   BASOPHILS  0.80   --   0.90  1.00   ASTSGOT  26  35   --   29   ALTSGPT  21  31   --   28   ALKPHOSPHAT  129*  127*   --   130*   TBILIRUBIN  0.7  0.7   --   0.5           Assessment/Plan     * Status epilepticus (CMS-[facility])   Assessment & Plan    Patient is clinically stable since transfer from ICU.   - cont Keppra 500 mg b.i.d. & phenytoin 100 mg t.i.d. in IV form.  - Pepcid   - Keep hernandez through 9/18 per urology  - Replace lytes          Aspiration pneumonia (CMS-HCC)   Assessment & Plan     Patient is at high risk for aspiration pna with dysphagia, history of alcohol abuse, and seizures. 9/11 broncheoalveolar culture grew few Gram negative rods x1.  Patient has not had leukocytosis or clinical signs of infection. CXRs show some R lung base atelectasis.   - D/c unasyn after last dose today (7 day course)  - Continue working with SLP         Alcohol withdrawal (CMS-HCC)   Assessment & Plan    Patient has a history of alcohol abuse. Patient reports drinking prior to presentation. Seizure likely 2/2 alcohol withdrawal.   -[facility] Protocol

## 2017-09-15 NOTE — DISCHARGE PLANNING
Medical Social Worker    RADHA spoke with RENATO Sequeira about SNF vs RIRF. RENATO put in physiatry order.     RADHA is aware that pt is currently restrained and will be a barrier to DC.

## 2017-09-15 NOTE — PROGRESS NOTES
Spoke with Daisy (cell: [phone]), Rafita's sister. She states that at baseline she believes he is coherent and communicates regularly, although she is unable to fully assess his status as he is usually drunk. She was encouraged to send a family member to visit him for a better judgment on how he is progressing and whether he is at baseline. She states someone should be visiting him this weekend. She states he lives at home alone, but family lives in the neighborhood and they check up on him regularly.     Physiatry assessed pt today and does not think he would be amenable to rehab services at they would not be able to arrange for a safe d/c to home alone and he may progress with with cognition and swallowing deficits.     The patient continues to be confused on physical exam. Given history of heavy alcohol abuse, we cannot rule out Wernicke's encephalopathy. IV thiamine started.

## 2017-09-15 NOTE — THERAPY
"  Speech Language Therapy Clinical Swallow Evaluation completed.  Functional Status: The patient was awake, pleasant but confused. Given min verbal cues he was able to feed himself thick liquid from the cup, taking one sip at a time. He consumed 8oz. However, about nursing home through the cup the patient had a wet sound and started belching, possibly indicative of reflux. He was unable to answer questions regarding reflux symptoms due to his confused speech and poor topic maintenance. Liquid was held for about 3 minutes and then he consumed the rest of the cup. He was unable to follow directions and/or complete dysphagia exercises accurately. Recommend to continue with nectar thick full liquid diet given 1:1 feeding and/or direct supervision while patient independently uses cup. Please allow breaks during meal intake. If patient continues to have these reflux type symptoms then further assessment of esophageal function may be warranted. SLp will continue to follow.   Recommendations - Diet: Diet / Liquid Recommendation: Nectar Thick Full Liquid                          Strategies: Strict 1:1 feeding , Direct supervision during meals, No Straws and Head of Bed at 90 Degrees                          Medication Administration: Medication Administration : Crush all Medications in Puree  Plan of Care: Will benefit from Speech Therapy 3 times per week  Post-Acute Therapy: Discharge to a transitional care facility for continued skilled therapy services.    See \"Rehab Therapy-Acute\" Patient Summary Report for complete documentation.   "

## 2017-09-15 NOTE — CARE PLAN
Problem: Safety  Goal: Will remain free from injury    Intervention: Provide assistance with mobility  Goal partially met.      Problem: Psychosocial Needs:  Goal: Level of anxiety will decrease    Intervention: Collaborate with Interdisciplinary Team including Psychologist/Behavioral Health Team  Goal partially met.

## 2017-09-15 NOTE — PROGRESS NOTES
Patient is alert at bedside and able to state his location and who he is. Pt is able to lopez & fc. Pt has L side facial drooping. Pt is mostly confused. Denies pain. Pt's bed alarm is on. PERRLA. Pt denies n&t. Pt has visual hallucinations. Pt is stable in room. Pt has nonviolent B wrist restraints. Pulses +3 of all extremities. Cap refills <3 seconds.

## 2017-09-15 NOTE — DISCHARGE PLANNING
Thank you for the opportunity to assist with this patient as they transition to post acute services.  We are aware of the Rehab referral from Dr. Valenzuela.  Dr. Wild to consult this referral. Our Transitional Case Coordinator will follow. At this time patient is showing to have Medicare as their coverage.   Please do not hesitate to call us if you require additional assistance my phone number is [phone] Eduard

## 2017-09-16 LAB
ALBUMIN SERPL BCP-MCNC: 3.5 G/DL (ref 3.2–4.9)
ALBUMIN/GLOB SERPL: 1.2 G/DL
ALP SERPL-CCNC: 122 U/L (ref 30–99)
ALT SERPL-CCNC: 27 U/L (ref 2–50)
ANION GAP SERPL CALC-SCNC: 7 MMOL/L (ref 0–11.9)
AST SERPL-CCNC: 22 U/L (ref 12–45)
BASOPHILS # BLD AUTO: 0.8 % (ref 0–1.8)
BASOPHILS # BLD: 0.03 K/UL (ref 0–0.12)
BILIRUB SERPL-MCNC: 0.5 MG/DL (ref 0.1–1.5)
BUN SERPL-MCNC: 9 MG/DL (ref 8–22)
CALCIUM SERPL-MCNC: 8.9 MG/DL (ref 8.5–10.5)
CHLORIDE SERPL-SCNC: 108 MMOL/L (ref 96–112)
CO2 SERPL-SCNC: 26 MMOL/L (ref 20–33)
CREAT SERPL-MCNC: 0.57 MG/DL (ref 0.5–1.4)
EOSINOPHIL # BLD AUTO: 0.2 K/UL (ref 0–0.51)
EOSINOPHIL NFR BLD: 5.4 % (ref 0–6.9)
ERYTHROCYTE [DISTWIDTH] IN BLOOD BY AUTOMATED COUNT: 52.1 FL (ref 35.9–50)
GFR SERPL CREATININE-BSD FRML MDRD: >60 ML/MIN/1.73 M 2
GLOBULIN SER CALC-MCNC: 2.9 G/DL (ref 1.9–3.5)
GLUCOSE SERPL-MCNC: 88 MG/DL (ref 65–99)
HCT VFR BLD AUTO: 35.7 % (ref 42–52)
HGB BLD-MCNC: 11.6 G/DL (ref 14–18)
IMM GRANULOCYTES # BLD AUTO: 0.02 K/UL (ref 0–0.11)
IMM GRANULOCYTES NFR BLD AUTO: 0.5 % (ref 0–0.9)
LYMPHOCYTES # BLD AUTO: 0.97 K/UL (ref 1–4.8)
LYMPHOCYTES NFR BLD: 26 % (ref 22–41)
MAGNESIUM SERPL-MCNC: 2.2 MG/DL (ref 1.5–2.5)
MCH RBC QN AUTO: 32.3 PG (ref 27–33)
MCHC RBC AUTO-ENTMCNC: 32.5 G/DL (ref 33.7–35.3)
MCV RBC AUTO: 99.4 FL (ref 81.4–97.8)
MONOCYTES # BLD AUTO: 0.5 K/UL (ref 0–0.85)
MONOCYTES NFR BLD AUTO: 13.4 % (ref 0–13.4)
NEUTROPHILS # BLD AUTO: 2.01 K/UL (ref 1.82–7.42)
NEUTROPHILS NFR BLD: 53.9 % (ref 44–72)
NRBC # BLD AUTO: 0 K/UL
NRBC BLD AUTO-RTO: 0 /100 WBC
PLATELET # BLD AUTO: 201 K/UL (ref 164–446)
PMV BLD AUTO: 8.7 FL (ref 9–12.9)
POTASSIUM SERPL-SCNC: 3.1 MMOL/L (ref 3.6–5.5)
PROT SERPL-MCNC: 6.4 G/DL (ref 6–8.2)
RBC # BLD AUTO: 3.59 M/UL (ref 4.7–6.1)
SODIUM SERPL-SCNC: 141 MMOL/L (ref 135–145)
WBC # BLD AUTO: 3.7 K/UL (ref 4.8–10.8)

## 2017-09-16 PROCEDURE — 700111 HCHG RX REV CODE 636 W/ 250 OVERRIDE (IP): Performed by: INTERNAL MEDICINE

## 2017-09-16 PROCEDURE — 36415 COLL VENOUS BLD VENIPUNCTURE: CPT

## 2017-09-16 PROCEDURE — 700102 HCHG RX REV CODE 250 W/ 637 OVERRIDE(OP): Performed by: STUDENT IN AN ORGANIZED HEALTH CARE EDUCATION/TRAINING PROGRAM

## 2017-09-16 PROCEDURE — 700105 HCHG RX REV CODE 258: Performed by: STUDENT IN AN ORGANIZED HEALTH CARE EDUCATION/TRAINING PROGRAM

## 2017-09-16 PROCEDURE — 770001 HCHG ROOM/CARE - MED/SURG/GYN PRIV*

## 2017-09-16 PROCEDURE — 85025 COMPLETE CBC W/AUTO DIFF WBC: CPT

## 2017-09-16 PROCEDURE — 83735 ASSAY OF MAGNESIUM: CPT

## 2017-09-16 PROCEDURE — 700105 HCHG RX REV CODE 258: Performed by: INTERNAL MEDICINE

## 2017-09-16 PROCEDURE — 700111 HCHG RX REV CODE 636 W/ 250 OVERRIDE (IP): Performed by: STUDENT IN AN ORGANIZED HEALTH CARE EDUCATION/TRAINING PROGRAM

## 2017-09-16 PROCEDURE — 302118 SHAMPOO,NO RINSE: Performed by: INTERNAL MEDICINE

## 2017-09-16 PROCEDURE — 99232 SBSQ HOSP IP/OBS MODERATE 35: CPT | Mod: GC | Performed by: INTERNAL MEDICINE

## 2017-09-16 PROCEDURE — 700102 HCHG RX REV CODE 250 W/ 637 OVERRIDE(OP): Performed by: PHARMACIST

## 2017-09-16 PROCEDURE — A9270 NON-COVERED ITEM OR SERVICE: HCPCS | Performed by: INTERNAL MEDICINE

## 2017-09-16 PROCEDURE — A9270 NON-COVERED ITEM OR SERVICE: HCPCS | Performed by: PHARMACIST

## 2017-09-16 PROCEDURE — 700102 HCHG RX REV CODE 250 W/ 637 OVERRIDE(OP): Performed by: INTERNAL MEDICINE

## 2017-09-16 PROCEDURE — A9270 NON-COVERED ITEM OR SERVICE: HCPCS | Performed by: STUDENT IN AN ORGANIZED HEALTH CARE EDUCATION/TRAINING PROGRAM

## 2017-09-16 PROCEDURE — 80053 COMPREHEN METABOLIC PANEL: CPT

## 2017-09-16 RX ORDER — POTASSIUM CHLORIDE 20 MEQ/1
40 TABLET, EXTENDED RELEASE ORAL ONCE
Status: DISCONTINUED | OUTPATIENT
Start: 2017-09-16 | End: 2017-09-16

## 2017-09-16 RX ORDER — POTASSIUM CHLORIDE 20 MEQ/1
40 TABLET, EXTENDED RELEASE ORAL DAILY
Status: DISCONTINUED | OUTPATIENT
Start: 2017-09-16 | End: 2017-09-17

## 2017-09-16 RX ADMIN — LEVETIRACETAM 500 MG: 100 SOLUTION ORAL at 08:01

## 2017-09-16 RX ADMIN — FAMOTIDINE 20 MG: 20 TABLET, FILM COATED ORAL at 21:17

## 2017-09-16 RX ADMIN — STANDARDIZED SENNA CONCENTRATE AND DOCUSATE SODIUM 2 TABLET: 8.6; 5 TABLET, FILM COATED ORAL at 21:17

## 2017-09-16 RX ADMIN — FAMOTIDINE 20 MG: 20 TABLET, FILM COATED ORAL at 08:00

## 2017-09-16 RX ADMIN — THIAMINE HYDROCHLORIDE 500 MG: 100 INJECTION, SOLUTION INTRAMUSCULAR; INTRAVENOUS at 08:19

## 2017-09-16 RX ADMIN — STANDARDIZED SENNA CONCENTRATE AND DOCUSATE SODIUM 2 TABLET: 8.6; 5 TABLET, FILM COATED ORAL at 08:00

## 2017-09-16 RX ADMIN — THIAMINE HYDROCHLORIDE 500 MG: 100 INJECTION, SOLUTION INTRAMUSCULAR; INTRAVENOUS at 21:18

## 2017-09-16 RX ADMIN — AMPICILLIN SODIUM AND SULBACTAM SODIUM 3 G: 2; 1 INJECTION, POWDER, FOR SOLUTION INTRAMUSCULAR; INTRAVENOUS at 01:04

## 2017-09-16 RX ADMIN — ENOXAPARIN SODIUM 40 MG: 100 INJECTION SUBCUTANEOUS at 08:00

## 2017-09-16 RX ADMIN — PHENYTOIN 100 MG: 125 SUSPENSION ORAL at 05:25

## 2017-09-16 RX ADMIN — POTASSIUM CHLORIDE 40 MEQ: 1500 TABLET, EXTENDED RELEASE ORAL at 12:29

## 2017-09-16 RX ADMIN — AMPICILLIN SODIUM AND SULBACTAM SODIUM 3 G: 2; 1 INJECTION, POWDER, FOR SOLUTION INTRAMUSCULAR; INTRAVENOUS at 05:25

## 2017-09-16 RX ADMIN — LEVETIRACETAM 500 MG: 100 SOLUTION ORAL at 21:17

## 2017-09-16 RX ADMIN — POTASSIUM CHLORIDE 40 MEQ: 1500 TABLET, EXTENDED RELEASE ORAL at 08:00

## 2017-09-16 RX ADMIN — PHENYTOIN 100 MG: 125 SUSPENSION ORAL at 21:17

## 2017-09-16 RX ADMIN — PHENYTOIN 100 MG: 125 SUSPENSION ORAL at 16:16

## 2017-09-16 RX ADMIN — THIAMINE HYDROCHLORIDE 500 MG: 100 INJECTION, SOLUTION INTRAMUSCULAR; INTRAVENOUS at 16:16

## 2017-09-16 ASSESSMENT — PAIN SCALES - GENERAL
PAINLEVEL_OUTOF10: 0

## 2017-09-16 ASSESSMENT — PATIENT HEALTH QUESTIONNAIRE - PHQ9
SUM OF ALL RESPONSES TO PHQ QUESTIONS 1-9: 0
2. FEELING DOWN, DEPRESSED, IRRITABLE, OR HOPELESS: NOT AT ALL
SUM OF ALL RESPONSES TO PHQ9 QUESTIONS 1 AND 2: 0
1. LITTLE INTEREST OR PLEASURE IN DOING THINGS: NOT AT ALL

## 2017-09-16 NOTE — PROGRESS NOTES
Internal Medicine Interval Note    Name Crescencio Eller     1955   Age/Sex 62 y.o. male   MRN [id number]   Code Status FULL     After 5PM or if no immediate response to page, please call for cross-coverage  Attending/Team: Dr. Karimi/Hua See Patient List for primary contact information  Call [phone] to page    1st Call - Day Intern (R1):   Dr. Valenzuela 2nd Call - Day Sr. Resident (R2/R3):   Dr. Cavazos         Reason for interval visit  (Principal Problem)   Status epilepticus (CMS-HCC)    Interval Problem Daily Status Update  (24 hours)   Pt seen and examined at bedside. No acute changes. Pt is talkative in good spirits, still confused and answers inappropriately to most questions. No delirium.  Soft wrist restraints. No CP/SOB. Strength in upper extremities equal bilaterally. Tolerating PO diet. Finding placement. Pt is not a good candidate for rehab as per physiatry. Expecting visit from family member during weekend to assess for baseline mental status.     ROS  Unable to reliably assess.  Pt denies CP/SOB. No abdominal pain. No GI complaints. Not coughing during exam.      Consultants/Specialty  IM    Disposition  Inpatient    Quality Measures  Quality-Core Measures  Reviewed items::  Labs reviewed, Medications reviewed and Radiology images reviewed  Benson catheter::  Critically Ill - Requiring Accurate Measurement of Urinary Output  DVT prophylaxis pharmacological::  Enoxaparin (Lovenox)      Physical Exam       Vitals:    09/15/17 1600 09/15/17 2000 17 0000 17 0400   BP: 122/80 121/78 122/77 128/79   Pulse: 85 88 79 83   Resp: 18  16 16   Temp: 36.3 °C (97.3 °F) 36.3 °C (97.3 °F) 36.4 °C (97.6 °F) 36.6 °C (97.8 °F)   SpO2: 94% 93% 95% 93%   Weight:       Height:         Body mass index is 29.64 kg/m².    Oxygen Therapy:  Pulse Oximetry: 93 %, O2 (LPM): 1, O2 Delivery: Silicone Nasal Cannula    Physical Exam  Constitutional: Pleasant mood. Exam unchanged from yesterday. No acute  distress.  HENT:   Head: Normocephalic and atraumatic.   Eyes: Pupils are equal, round, and reactive to light. Right eye exhibits no discharge. No scleral icterus.   Neck: No JVD present.   Cardiovascular: RRR. No m/r/g.    Pulmonary/Chest: No respiratory distress.   Musculoskeletal: He exhibits no edema or tenderness.   Skin: Skin is warm and dry.  Neuro: Inappropriately responds to most questions. Confused.        Lab Data Review:         9/16/2017  8:10 AM    Recent Labs      09/14/17   0510  09/15/17   0525  09/16/17   0449   SODIUM  138  142  141   POTASSIUM  3.2*  3.0*  3.1*   CHLORIDE  105  107  108   CO2  26  26  26   BUN  9  7*  9   CREATININE  0.61  0.57  0.57   MAGNESIUM  2.1  2.2  2.2   CALCIUM  9.1  8.7  8.9       Recent Labs      09/14/17   0510  09/15/17   0525  09/16/17   0449   ALTSGPT  31  28  27   ASTSGOT  35  29  22   ALKPHOSPHAT  127*  130*  122*   TBILIRUBIN  0.7  0.5  0.5   GLUCOSE  98  86  88       Recent Labs      09/14/17   0511  09/15/17   0525  09/16/17   0449   RBC  3.52*  3.61*  3.59*   HEMOGLOBIN  11.4*  12.0*  11.6*   HEMATOCRIT  34.5*  35.9*  35.7*   PLATELETCT  206  192  201       Recent Labs      09/14/17   0510  09/14/17   0511  09/15/17   0525  09/16/17   0449   WBC   --   4.3*  3.0*  3.7*   NEUTSPOLYS   --   60.90  45.70  53.90   LYMPHOCYTES   --   21.30*  29.90  26.00   MONOCYTES   --   10.50  15.10*  13.40   EOSINOPHILS   --   5.90  7.60*  5.40   BASOPHILS   --   0.90  1.00  0.80   ASTSGOT  35   --   29  22   ALTSGPT  31   --   28  27   ALKPHOSPHAT  127*   --   130*  122*   TBILIRUBIN  0.7   --   0.5  0.5           Assessment/Plan     * Status epilepticus (CMS-[facility])   Assessment & Plan    Patient is clinically stable since transfer from ICU.   - cont Keppra 500 mg b.i.d. & phenytoin 100 mg t.i.d. in IV form.  - Pepcid   - Keep hernandez through 9/18 per urology  - Replace lytes           Aspiration pneumonia (CMS-HCC)   Assessment & Plan    Patient is at high risk for aspiration  "pna with dysphagia, history of alcohol abuse, and seizures. 9/11 broncheoalveolar culture grew few Gram negative rods x1.  Patient has not had leukocytosis or clinical signs of infection. CXRs show some R lung base atelectasis.   - S/p 7 day course unasyn (last dose this am 9/16)  - Continue working with SLP         Alcohol withdrawal (CMS-HCC)   Assessment & Plan    Patient has a history of alcohol abuse. Patient reports drinking prior to presentation. Seizure likely 2/2 alcohol withdrawal. Pt's confusion persists, unable to determine baseline. Sister Daisy unable to provide clear details regarding baseline as he is \"always drunk.\"  -[facility] Protocol   -Encouraged family to visit patient to assess whether he is at baseline  -Attempting to treat potential reversible cause of confusion (Wernicke's) with high dose thiamine   -CTM          "

## 2017-09-16 NOTE — PROGRESS NOTES
Bed alarm going off, pt agitated, able to pull himself out of restraints and trying to get out of bed. Pt pulled out IV. Pt placed back in bed and back in restraints. Will continue to monitor.

## 2017-09-16 NOTE — PROGRESS NOTES
Received bedside report and accepted care of pt. Pt currently resting in bed no visible or stated distress. Pt confused, alert and oriented to only self and place. Denies pain. Restraints in place. No family at bedside. Bed control on and bed in locked in position. Bed alarm on.  Call light  within reach. Hourly rounding in place.

## 2017-09-16 NOTE — PROGRESS NOTES
Alerted Dr. Martins that restraints were removed from pt as he is currently cooperative.  Lap belt and bed alarm in place.  Notified MD that a new order for restraints may be necessary if pt becomes less cooperative or more confused due to his baseline cognitive deficits.

## 2017-09-16 NOTE — PROGRESS NOTES
Received report on pt, who is oriented to person and place, but disoriented to situation or time.  HE ROMO and is cooperative with plan of care.  He understands how to use call light, although is forgetful to this at times.

## 2017-09-16 NOTE — CARE PLAN
"Problem: Safety  Goal: Will remain free from injury  Outcome: PROGRESSING AS EXPECTED  PT requiring restraints through morning, but was very pleasant and redirectable.  Demonstrates some word finding difficulty, but seems to understand he is at hospital and states the reason he is here in hospital is \"because of the booze.\"  RN able to d/c restraints and provide for safety needs with lap belt, bed alarm, and q 2 hour rounding.  Alerted [facility].    Problem: Discharge Barriers/Planning  Goal: Patient's continuum of care needs will be met  Outcome: PROGRESSING AS EXPECTED  PT alert and aware of situation, although he can be impulsive and forgetful.  HE does demonstrate some word finding difficulties.  Receiving thiamine iv to attempt to slow down possible Wernicke's encephalopathy.  He remains oriented x2 and redirectable throughout the shift.      "

## 2017-09-16 NOTE — CARE PLAN
Problem: Safety  Goal: Will remain free from falls  Room/floor clear. Non skid socks on. Proper signs up. Bed alarm on. Bed low and locked. Call light and belonging within reach. Hourly rounding in place.     Problem: Pain Management  Goal: Pain level will decrease to patient's comfort goal  Pain assessment Q4H or Q2H after medication intervention. Encouraged pt to report pain, verbalized understanding.

## 2017-09-17 LAB
ALBUMIN SERPL BCP-MCNC: 3.4 G/DL (ref 3.2–4.9)
ALBUMIN/GLOB SERPL: 1.1 G/DL
ALP SERPL-CCNC: 122 U/L (ref 30–99)
ALT SERPL-CCNC: 29 U/L (ref 2–50)
ANION GAP SERPL CALC-SCNC: 6 MMOL/L (ref 0–11.9)
AST SERPL-CCNC: 21 U/L (ref 12–45)
BASOPHILS # BLD AUTO: 0.8 % (ref 0–1.8)
BASOPHILS # BLD: 0.03 K/UL (ref 0–0.12)
BILIRUB SERPL-MCNC: 0.4 MG/DL (ref 0.1–1.5)
BUN SERPL-MCNC: 7 MG/DL (ref 8–22)
CALCIUM SERPL-MCNC: 8.8 MG/DL (ref 8.5–10.5)
CHLORIDE SERPL-SCNC: 107 MMOL/L (ref 96–112)
CO2 SERPL-SCNC: 27 MMOL/L (ref 20–33)
CREAT SERPL-MCNC: 0.59 MG/DL (ref 0.5–1.4)
EOSINOPHIL # BLD AUTO: 0.25 K/UL (ref 0–0.51)
EOSINOPHIL NFR BLD: 6.9 % (ref 0–6.9)
ERYTHROCYTE [DISTWIDTH] IN BLOOD BY AUTOMATED COUNT: 51.8 FL (ref 35.9–50)
GFR SERPL CREATININE-BSD FRML MDRD: >60 ML/MIN/1.73 M 2
GLOBULIN SER CALC-MCNC: 3 G/DL (ref 1.9–3.5)
GLUCOSE SERPL-MCNC: 90 MG/DL (ref 65–99)
HCT VFR BLD AUTO: 36.1 % (ref 42–52)
HGB BLD-MCNC: 11.9 G/DL (ref 14–18)
IMM GRANULOCYTES # BLD AUTO: 0.01 K/UL (ref 0–0.11)
IMM GRANULOCYTES NFR BLD AUTO: 0.3 % (ref 0–0.9)
LYMPHOCYTES # BLD AUTO: 1.27 K/UL (ref 1–4.8)
LYMPHOCYTES NFR BLD: 35 % (ref 22–41)
MAGNESIUM SERPL-MCNC: 2.1 MG/DL (ref 1.5–2.5)
MCH RBC QN AUTO: 32.4 PG (ref 27–33)
MCHC RBC AUTO-ENTMCNC: 33 G/DL (ref 33.7–35.3)
MCV RBC AUTO: 98.4 FL (ref 81.4–97.8)
MONOCYTES # BLD AUTO: 0.43 K/UL (ref 0–0.85)
MONOCYTES NFR BLD AUTO: 11.8 % (ref 0–13.4)
NEUTROPHILS # BLD AUTO: 1.64 K/UL (ref 1.82–7.42)
NEUTROPHILS NFR BLD: 45.2 % (ref 44–72)
NRBC # BLD AUTO: 0 K/UL
NRBC BLD AUTO-RTO: 0 /100 WBC
PLATELET # BLD AUTO: 239 K/UL (ref 164–446)
PMV BLD AUTO: 8.7 FL (ref 9–12.9)
POTASSIUM SERPL-SCNC: 3.4 MMOL/L (ref 3.6–5.5)
PROT SERPL-MCNC: 6.4 G/DL (ref 6–8.2)
RBC # BLD AUTO: 3.67 M/UL (ref 4.7–6.1)
SODIUM SERPL-SCNC: 140 MMOL/L (ref 135–145)
WBC # BLD AUTO: 3.6 K/UL (ref 4.8–10.8)

## 2017-09-17 PROCEDURE — 80053 COMPREHEN METABOLIC PANEL: CPT

## 2017-09-17 PROCEDURE — 700102 HCHG RX REV CODE 250 W/ 637 OVERRIDE(OP): Performed by: HOSPITALIST

## 2017-09-17 PROCEDURE — 700102 HCHG RX REV CODE 250 W/ 637 OVERRIDE(OP): Performed by: PHARMACIST

## 2017-09-17 PROCEDURE — A9270 NON-COVERED ITEM OR SERVICE: HCPCS | Performed by: HOSPITALIST

## 2017-09-17 PROCEDURE — 85025 COMPLETE CBC W/AUTO DIFF WBC: CPT

## 2017-09-17 PROCEDURE — 700105 HCHG RX REV CODE 258: Performed by: STUDENT IN AN ORGANIZED HEALTH CARE EDUCATION/TRAINING PROGRAM

## 2017-09-17 PROCEDURE — A9270 NON-COVERED ITEM OR SERVICE: HCPCS | Performed by: PHARMACIST

## 2017-09-17 PROCEDURE — 700111 HCHG RX REV CODE 636 W/ 250 OVERRIDE (IP): Performed by: STUDENT IN AN ORGANIZED HEALTH CARE EDUCATION/TRAINING PROGRAM

## 2017-09-17 PROCEDURE — 99232 SBSQ HOSP IP/OBS MODERATE 35: CPT | Mod: GC | Performed by: INTERNAL MEDICINE

## 2017-09-17 PROCEDURE — 83735 ASSAY OF MAGNESIUM: CPT

## 2017-09-17 PROCEDURE — A9270 NON-COVERED ITEM OR SERVICE: HCPCS | Performed by: INTERNAL MEDICINE

## 2017-09-17 PROCEDURE — 700102 HCHG RX REV CODE 250 W/ 637 OVERRIDE(OP): Performed by: INTERNAL MEDICINE

## 2017-09-17 PROCEDURE — 36415 COLL VENOUS BLD VENIPUNCTURE: CPT

## 2017-09-17 PROCEDURE — 770001 HCHG ROOM/CARE - MED/SURG/GYN PRIV*

## 2017-09-17 PROCEDURE — 700111 HCHG RX REV CODE 636 W/ 250 OVERRIDE (IP): Performed by: INTERNAL MEDICINE

## 2017-09-17 RX ORDER — POTASSIUM CHLORIDE 20 MEQ/1
30 TABLET, EXTENDED RELEASE ORAL 2 TIMES DAILY
Status: DISCONTINUED | OUTPATIENT
Start: 2017-09-17 | End: 2017-09-18

## 2017-09-17 RX ADMIN — FAMOTIDINE 20 MG: 20 TABLET, FILM COATED ORAL at 21:05

## 2017-09-17 RX ADMIN — LEVETIRACETAM 500 MG: 100 SOLUTION ORAL at 08:50

## 2017-09-17 RX ADMIN — FAMOTIDINE 20 MG: 20 TABLET, FILM COATED ORAL at 08:42

## 2017-09-17 RX ADMIN — POTASSIUM CHLORIDE 30 MEQ: 1500 TABLET, EXTENDED RELEASE ORAL at 08:42

## 2017-09-17 RX ADMIN — ENOXAPARIN SODIUM 40 MG: 100 INJECTION SUBCUTANEOUS at 08:50

## 2017-09-17 RX ADMIN — PHENYTOIN 100 MG: 125 SUSPENSION ORAL at 21:08

## 2017-09-17 RX ADMIN — PHENYTOIN 100 MG: 125 SUSPENSION ORAL at 14:36

## 2017-09-17 RX ADMIN — PHENYTOIN 100 MG: 125 SUSPENSION ORAL at 05:25

## 2017-09-17 RX ADMIN — POTASSIUM CHLORIDE 30 MEQ: 1500 TABLET, EXTENDED RELEASE ORAL at 21:06

## 2017-09-17 RX ADMIN — THIAMINE HYDROCHLORIDE 500 MG: 100 INJECTION, SOLUTION INTRAMUSCULAR; INTRAVENOUS at 08:42

## 2017-09-17 RX ADMIN — LEVETIRACETAM 500 MG: 100 SOLUTION ORAL at 21:08

## 2017-09-17 RX ADMIN — STANDARDIZED SENNA CONCENTRATE AND DOCUSATE SODIUM 2 TABLET: 8.6; 5 TABLET, FILM COATED ORAL at 21:05

## 2017-09-17 RX ADMIN — STANDARDIZED SENNA CONCENTRATE AND DOCUSATE SODIUM 2 TABLET: 8.6; 5 TABLET, FILM COATED ORAL at 08:42

## 2017-09-17 ASSESSMENT — PAIN SCALES - GENERAL
PAINLEVEL_OUTOF10: 0
PAINLEVEL_OUTOF10: 0

## 2017-09-17 NOTE — PROGRESS NOTES
Pt. Is confused, changes the subject when asked what his name is, able to state his date of birth and why he is here but not where we are. IV patent. Lap belt in place. Pt. Currently sitting up in a chair. Poc discussed, bed/chair alarm on, call light within reach.

## 2017-09-17 NOTE — PROGRESS NOTES
PT remains with hernandez cathert per urology md orders due to taumatic removal of recent hernandez catheter.  Urine is angela yellow, no sediment.  He is making about 50 ml per hour.  Reminders offered q 2 hours about hernandez catheter care and to prevent pulling at tube.

## 2017-09-17 NOTE — DISCHARGE PLANNING
Received choice form from RADHA Gupta for SNF. Referral sent to [facility] at 1526.

## 2017-09-17 NOTE — DISCHARGE PLANNING
Medical SW    Referral: Sw received VM from Rehab.    Intervention: Pt does not appear to be appropriate for rehab admission.    Sw met w/ pt at bedside. He chose [facility]F for therapy. Yenny faxed completed choice form to Ronnie. Yenny received fax confirmation. Yenny called Ronnie.         Plan: Yenny to assist w/ d/c planning as needed.

## 2017-09-17 NOTE — PROGRESS NOTES
Pt sitting up in chair comfortably. Pt confused, disoriented to event and time at this time. Restraints d/c'd by day RN. Pt remains calm and cooperative with care. No s/s or stated distress. Denies pain. NASIR HERRERA. Call light within reach and demonstrated use of call light. Chair alarm on. Yellow non skid socks on. Lap belt on and demonstrated how to undo. Fall precaution in effect. Hourly rounding in place.

## 2017-09-17 NOTE — CARE PLAN
Problem: Venous Thromboembolism (VTW)/Deep Vein Thrombosis (DVT) Prevention:  Goal: Patient will participate in Venous Thrombosis (VTE)/Deep Vein Thrombosis (DVT)Prevention Measures  SCDs in place.     Problem: Psychosocial Needs:  Goal: Level of anxiety will decrease  D/C'd restraints during the day. Pt remains calm and cooperative with care. Pt sitting up in chair >2 hrs and walked hallway, pt tolerated well. Pt back to bed. Lap belt in place. Bed low and locked. Bed alarm on. Room close to nursing station. Hourly rounding in place.

## 2017-09-17 NOTE — PROGRESS NOTES
"       Internal Medicine Interval Note    Name Crescencio Eller     1955   Age/Sex 62 y.o. male   MRN [id number]   Code Status FULL     After 5PM or if no immediate response to page, please call for cross-coverage  Attending/Team: Dr. Karimi/Hua See Patient List for primary contact information  Call [phone] to page    1st Call - Day Intern (R1):   Dr. Valenzuela 2nd Call - Day Sr. Resident (R2/R3):   Dr. Cavazos         Reason for interval visit  (Principal Problem)   Status epilepticus (CMS-[facility])    Interval Problem Daily Status Update  (24 hours)   No longer needs restraints, K improved, but still low, increased PO K to 60 meq QD. His current mental status is multiple mostly his baseline.     ROS  Unable to reliably assess.   Pt denies CP/SOB. No abdominal pain. No GI complaints. Not coughing during exam.      Consultants/Specialty  IM    Disposition  Inpatient    Quality Measures  Quality-Core Measures  Reviewed items::  Labs reviewed, Medications reviewed and Radiology images reviewed  DVT prophylaxis pharmacological::  Enoxaparin (Lovenox)  Hernandez in \"Keep hernandez through \" per urology     Physical Exam       Vitals:    17 1500 17 2000 17 0400 17 0735   BP: 120/69 121/82 130/75 138/83   Pulse: 84 88 81 86   Resp:    Temp: 36.3 °C (97.3 °F) 36.4 °C (97.6 °F) 36.7 °C (98.1 °F) 36.4 °C (97.5 °F)   SpO2: 90% 91% 99% 91%   Weight:       Height:         Body mass index is 29.64 kg/m².    Oxygen Therapy:  Pulse Oximetry: 91 %, O2 (LPM): 1.5, O2 Delivery: Silicone Nasal Cannula    Physical Exam  Constitutional: Pleasant mood. Exam unchanged from yesterday. No acute distress.  HENT:   Head: Normocephalic and atraumatic.   Eyes: Pupils are equal, round, and reactive to light. Right eye exhibits no discharge. No scleral icterus.   Neck: No JVD present.   Cardiovascular: RRR. No m/r/g.    Pulmonary/Chest: No respiratory distress.   Musculoskeletal: He exhibits no edema or " tenderness.   Skin: Skin is warm and dry.  Neuro: Inappropriately responds to most questions. Confused.      Lab Data Review:     9/16/2017  8:10 AM    Recent Labs      09/15/17   0525  09/16/17   0449  09/17/17   0454   SODIUM  142  141  140   POTASSIUM  3.0*  3.1*  3.4*   CHLORIDE  107  108  107   CO2  26  26  27   BUN  7*  9  7*   CREATININE  0.57  0.57  0.59   MAGNESIUM  2.2  2.2  2.1   CALCIUM  8.7  8.9  8.8       Recent Labs      09/15/17   0525  09/16/17   0449  09/17/17   0454   ALTSGPT  28  27  29   ASTSGOT  29  22  21   ALKPHOSPHAT  130*  122*  122*   TBILIRUBIN  0.5  0.5  0.4   GLUCOSE  86  88  90       Recent Labs      09/15/17   0525  09/16/17   0449  09/17/17   0454   RBC  3.61*  3.59*  3.67*   HEMOGLOBIN  12.0*  11.6*  11.9*   HEMATOCRIT  35.9*  35.7*  36.1*   PLATELETCT  192  201  239       Recent Labs      09/15/17   0525  09/16/17   0449  09/17/17   0454   WBC  3.0*  3.7*  3.6*   NEUTSPOLYS  45.70  53.90  45.20   LYMPHOCYTES  29.90  26.00  35.00   MONOCYTES  15.10*  13.40  11.80   EOSINOPHILS  7.60*  5.40  6.90   BASOPHILS  1.00  0.80  0.80   ASTSGOT  29  22  21   ALTSGPT  28  27  29   ALKPHOSPHAT  130*  122*  122*   TBILIRUBIN  0.5  0.5  0.4       Assessment/Plan     * Status epilepticus (CMS-HCC)   Assessment & Plan    No seizures overnight   Cont Keppra 500 mg b.i.d. & phenytoin 100 mg t.i.d. in PO form.  Keep hernandez through 9/18 per urology  Replacing lytes           Aspiration pneumonia (CMS-HCC)   Assessment & Plan    Patient is at high risk for aspiration pna with dysphagia, history of alcohol abuse, and seizures. 9/11 broncheoalveolar culture grew few Gram negative rods x1.  Patient has not had leukocytosis or clinical signs of infection. CXRs show some R lung base atelectasis.   - S/p 7 day course unasyn (last dose this am 9/16)  - Continue working with SLP          Alcohol withdrawal (CMS-[facility])   Assessment & Plan    Patient has a history of alcohol abuse. Patient reports drinking prior to  "presentation. Seizure likely 2/2 alcohol withdrawal. Pt's confusion persists, unable to determine baseline. Sister Daisy unable to provide clear details regarding baseline as he is \"always drunk.\"  -Not agitated anymore, restraints have been removed 9/17/2017   -Mental status mostly at his baseline  -Attempting to treat potential reversible cause of confusion (Wernicke's) with high dose thiamine   -CTM          "

## 2017-09-17 NOTE — DISCHARGE PLANNING
Rehab Physiatrist Dr. Wild consult recommending:    Despite having 3/3 therapy needs, the patient is likely not a good candidate for acute inpatient rehabilitation, as he may continue to have cognitive deficits after rehab, and he doesn't appear to have any 24/7 support. We wouldn't be able to arrange for a safe discharge home alone.     He also may continue to progress with his cognition and swallow, so that he may be safe for discharge home directly, with home health nursing for medication management and close outpatient follow up with his PCP to ensure he's compliant with his seizure medications.    Voice message update to d/c planner ext [phone] regarding decision. TCC will follow.

## 2017-09-18 LAB
ANION GAP SERPL CALC-SCNC: 8 MMOL/L (ref 0–11.9)
BUN SERPL-MCNC: 5 MG/DL (ref 8–22)
CALCIUM SERPL-MCNC: 8.7 MG/DL (ref 8.5–10.5)
CHLORIDE SERPL-SCNC: 107 MMOL/L (ref 96–112)
CO2 SERPL-SCNC: 26 MMOL/L (ref 20–33)
CREAT SERPL-MCNC: 0.6 MG/DL (ref 0.5–1.4)
CRP SERPL HS-MCNC: 3.28 MG/DL (ref 0–0.75)
GFR SERPL CREATININE-BSD FRML MDRD: >60 ML/MIN/1.73 M 2
GLUCOSE SERPL-MCNC: 89 MG/DL (ref 65–99)
MAGNESIUM SERPL-MCNC: 2 MG/DL (ref 1.5–2.5)
POTASSIUM SERPL-SCNC: 3.5 MMOL/L (ref 3.6–5.5)
PREALB SERPL-MCNC: 15 MG/DL (ref 18–38)
SODIUM SERPL-SCNC: 141 MMOL/L (ref 135–145)

## 2017-09-18 PROCEDURE — 83735 ASSAY OF MAGNESIUM: CPT

## 2017-09-18 PROCEDURE — 700111 HCHG RX REV CODE 636 W/ 250 OVERRIDE (IP): Performed by: STUDENT IN AN ORGANIZED HEALTH CARE EDUCATION/TRAINING PROGRAM

## 2017-09-18 PROCEDURE — 700111 HCHG RX REV CODE 636 W/ 250 OVERRIDE (IP): Performed by: INTERNAL MEDICINE

## 2017-09-18 PROCEDURE — 770001 HCHG ROOM/CARE - MED/SURG/GYN PRIV*

## 2017-09-18 PROCEDURE — 700102 HCHG RX REV CODE 250 W/ 637 OVERRIDE(OP): Performed by: INTERNAL MEDICINE

## 2017-09-18 PROCEDURE — 86140 C-REACTIVE PROTEIN: CPT

## 2017-09-18 PROCEDURE — A9270 NON-COVERED ITEM OR SERVICE: HCPCS | Performed by: STUDENT IN AN ORGANIZED HEALTH CARE EDUCATION/TRAINING PROGRAM

## 2017-09-18 PROCEDURE — 36415 COLL VENOUS BLD VENIPUNCTURE: CPT

## 2017-09-18 PROCEDURE — 700105 HCHG RX REV CODE 258: Performed by: STUDENT IN AN ORGANIZED HEALTH CARE EDUCATION/TRAINING PROGRAM

## 2017-09-18 PROCEDURE — A9270 NON-COVERED ITEM OR SERVICE: HCPCS | Performed by: PHARMACIST

## 2017-09-18 PROCEDURE — 80048 BASIC METABOLIC PNL TOTAL CA: CPT

## 2017-09-18 PROCEDURE — 700102 HCHG RX REV CODE 250 W/ 637 OVERRIDE(OP): Performed by: STUDENT IN AN ORGANIZED HEALTH CARE EDUCATION/TRAINING PROGRAM

## 2017-09-18 PROCEDURE — 700102 HCHG RX REV CODE 250 W/ 637 OVERRIDE(OP): Performed by: PHARMACIST

## 2017-09-18 PROCEDURE — 99232 SBSQ HOSP IP/OBS MODERATE 35: CPT | Mod: GC | Performed by: HOSPITALIST

## 2017-09-18 PROCEDURE — 84134 ASSAY OF PREALBUMIN: CPT

## 2017-09-18 PROCEDURE — A9270 NON-COVERED ITEM OR SERVICE: HCPCS | Performed by: INTERNAL MEDICINE

## 2017-09-18 RX ORDER — POTASSIUM CHLORIDE 20 MEQ/1
40 TABLET, EXTENDED RELEASE ORAL 2 TIMES DAILY
Status: DISCONTINUED | OUTPATIENT
Start: 2017-09-18 | End: 2017-09-20 | Stop reason: HOSPADM

## 2017-09-18 RX ADMIN — FAMOTIDINE 20 MG: 20 TABLET, FILM COATED ORAL at 20:25

## 2017-09-18 RX ADMIN — PHENYTOIN 100 MG: 125 SUSPENSION ORAL at 14:29

## 2017-09-18 RX ADMIN — STANDARDIZED SENNA CONCENTRATE AND DOCUSATE SODIUM 2 TABLET: 8.6; 5 TABLET, FILM COATED ORAL at 20:25

## 2017-09-18 RX ADMIN — STANDARDIZED SENNA CONCENTRATE AND DOCUSATE SODIUM 2 TABLET: 8.6; 5 TABLET, FILM COATED ORAL at 09:53

## 2017-09-18 RX ADMIN — LEVETIRACETAM 500 MG: 100 SOLUTION ORAL at 20:26

## 2017-09-18 RX ADMIN — PHENYTOIN 100 MG: 125 SUSPENSION ORAL at 20:26

## 2017-09-18 RX ADMIN — FAMOTIDINE 20 MG: 20 TABLET, FILM COATED ORAL at 09:53

## 2017-09-18 RX ADMIN — POTASSIUM CHLORIDE 40 MEQ: 1500 TABLET, EXTENDED RELEASE ORAL at 20:25

## 2017-09-18 RX ADMIN — LEVETIRACETAM 500 MG: 100 SOLUTION ORAL at 09:54

## 2017-09-18 RX ADMIN — PHENYTOIN 100 MG: 125 SUSPENSION ORAL at 06:18

## 2017-09-18 RX ADMIN — THIAMINE HYDROCHLORIDE 250 MG: 100 INJECTION, SOLUTION INTRAMUSCULAR; INTRAVENOUS at 09:58

## 2017-09-18 RX ADMIN — ENOXAPARIN SODIUM 40 MG: 100 INJECTION SUBCUTANEOUS at 09:53

## 2017-09-18 RX ADMIN — POTASSIUM CHLORIDE 40 MEQ: 1500 TABLET, EXTENDED RELEASE ORAL at 09:53

## 2017-09-18 ASSESSMENT — LIFESTYLE VARIABLES
EVER FELT BAD OR GUILTY ABOUT YOUR DRINKING: YES
HAVE PEOPLE ANNOYED YOU BY CRITICIZING YOUR DRINKING: YES
HAVE YOU EVER FELT YOU SHOULD CUT DOWN ON YOUR DRINKING: YES
TOTAL SCORE: 3
TOTAL SCORE: 3
CONSUMPTION TOTAL: POSITIVE
AVERAGE NUMBER OF DAYS PER WEEK YOU HAVE A DRINK CONTAINING ALCOHOL: 2
ON A TYPICAL DAY WHEN YOU DRINK ALCOHOL HOW MANY DRINKS DO YOU HAVE: 2
EVER HAD A DRINK FIRST THING IN THE MORNING TO STEADY YOUR NERVES TO GET RID OF A HANGOVER: NO
ALCOHOL_USE: YES
HOW MANY TIMES IN THE PAST YEAR HAVE YOU HAD 5 OR MORE DRINKS IN A DAY: 0
TOTAL SCORE: 3

## 2017-09-18 ASSESSMENT — PAIN SCALES - GENERAL
PAINLEVEL_OUTOF10: 0

## 2017-09-18 NOTE — PROGRESS NOTES
Pt has poor understanding of blood transfusion. Initially objected to blood transfusion while completing his admit profile. Will assess again when family present. Charge RN agrees with this.

## 2017-09-18 NOTE — DISCHARGE PLANNING
Followed up with [facility]. Spoke to Mame who states referral is pending review, however, she will contact this CCS with any updates.

## 2017-09-18 NOTE — DISCHARGE PLANNING
Medical Social Worker    RADHA was informed by DELFIN Patel that pt was deeclined by [facility] due to not being homebound and care exceeding capacity.     RADHA will inform RENATO Sequeira to explore new options.

## 2017-09-18 NOTE — DISCHARGE PLANNING
Medical Social Worker    RADHA paged RENATO Sequeira twice to inform them of updates of SNF denial, RADHA will attempt to page a third time.

## 2017-09-18 NOTE — DISCHARGE PLANNING
[facility] has declined patient as they feel care exceed capacity and pt is not homebound. Byron(RADHA) notified.

## 2017-09-18 NOTE — PROGRESS NOTES
Pt A&Ox3 (disoriented to time), denies any pain, states he has numbness in R arm and RLE, denies any tingling.    Bed alarm on, lap belt on, call light within reach, pt educated on importance of using call light when he needs assistance, pt verbalized understanding.

## 2017-09-18 NOTE — CARE PLAN
Problem: Communication  Goal: The ability to communicate needs accurately and effectively will improve  Outcome: PROGRESSING SLOWER THAN EXPECTED  Pt. Continues to have some dysarthria when trying to communicate. Is usually disoriented to time and occasionally to circumstance.     Problem: Safety  Goal: Will remain free from injury    Intervention: Provide assistance with mobility  Pt. Is able to mobilize steadily with standby assistance and fww. Provided education on leaving the walker on the ground when walking instead of picking it up.

## 2017-09-18 NOTE — CARE PLAN
Problem: Communication  Goal: The ability to communicate needs accurately and effectively will improve  Outcome: PROGRESSING AS EXPECTED  Pt. Calling appropriately for assistance.    Problem: Safety  Goal: Will remain free from injury  Outcome: PROGRESSING AS EXPECTED  Fall and seizure precautions in place.  No seizure activity noted this shift,

## 2017-09-19 LAB
ALBUMIN SERPL BCP-MCNC: 3.4 G/DL (ref 3.2–4.9)
ALBUMIN/GLOB SERPL: 1.1 G/DL
ALP SERPL-CCNC: 135 U/L (ref 30–99)
ALT SERPL-CCNC: 36 U/L (ref 2–50)
ANION GAP SERPL CALC-SCNC: 5 MMOL/L (ref 0–11.9)
AST SERPL-CCNC: 29 U/L (ref 12–45)
BILIRUB SERPL-MCNC: 0.4 MG/DL (ref 0.1–1.5)
BUN SERPL-MCNC: 5 MG/DL (ref 8–22)
CALCIUM SERPL-MCNC: 9.2 MG/DL (ref 8.5–10.5)
CHLORIDE SERPL-SCNC: 105 MMOL/L (ref 96–112)
CO2 SERPL-SCNC: 29 MMOL/L (ref 20–33)
CREAT SERPL-MCNC: 0.66 MG/DL (ref 0.5–1.4)
GFR SERPL CREATININE-BSD FRML MDRD: >60 ML/MIN/1.73 M 2
GLOBULIN SER CALC-MCNC: 3 G/DL (ref 1.9–3.5)
GLUCOSE SERPL-MCNC: 88 MG/DL (ref 65–99)
MAGNESIUM SERPL-MCNC: 2.1 MG/DL (ref 1.5–2.5)
POTASSIUM SERPL-SCNC: 4.1 MMOL/L (ref 3.6–5.5)
PROT SERPL-MCNC: 6.4 G/DL (ref 6–8.2)
SODIUM SERPL-SCNC: 139 MMOL/L (ref 135–145)

## 2017-09-19 PROCEDURE — 700111 HCHG RX REV CODE 636 W/ 250 OVERRIDE (IP): Performed by: STUDENT IN AN ORGANIZED HEALTH CARE EDUCATION/TRAINING PROGRAM

## 2017-09-19 PROCEDURE — A9270 NON-COVERED ITEM OR SERVICE: HCPCS | Performed by: STUDENT IN AN ORGANIZED HEALTH CARE EDUCATION/TRAINING PROGRAM

## 2017-09-19 PROCEDURE — 700102 HCHG RX REV CODE 250 W/ 637 OVERRIDE(OP): Performed by: PHARMACIST

## 2017-09-19 PROCEDURE — 700102 HCHG RX REV CODE 250 W/ 637 OVERRIDE(OP): Performed by: INTERNAL MEDICINE

## 2017-09-19 PROCEDURE — 80053 COMPREHEN METABOLIC PANEL: CPT

## 2017-09-19 PROCEDURE — 83735 ASSAY OF MAGNESIUM: CPT

## 2017-09-19 PROCEDURE — A9270 NON-COVERED ITEM OR SERVICE: HCPCS | Performed by: PHARMACIST

## 2017-09-19 PROCEDURE — 770001 HCHG ROOM/CARE - MED/SURG/GYN PRIV*

## 2017-09-19 PROCEDURE — 36415 COLL VENOUS BLD VENIPUNCTURE: CPT

## 2017-09-19 PROCEDURE — 99232 SBSQ HOSP IP/OBS MODERATE 35: CPT | Mod: GC | Performed by: HOSPITALIST

## 2017-09-19 PROCEDURE — 700111 HCHG RX REV CODE 636 W/ 250 OVERRIDE (IP): Performed by: INTERNAL MEDICINE

## 2017-09-19 PROCEDURE — A9270 NON-COVERED ITEM OR SERVICE: HCPCS | Performed by: INTERNAL MEDICINE

## 2017-09-19 PROCEDURE — 700105 HCHG RX REV CODE 258: Performed by: STUDENT IN AN ORGANIZED HEALTH CARE EDUCATION/TRAINING PROGRAM

## 2017-09-19 PROCEDURE — 700102 HCHG RX REV CODE 250 W/ 637 OVERRIDE(OP): Performed by: STUDENT IN AN ORGANIZED HEALTH CARE EDUCATION/TRAINING PROGRAM

## 2017-09-19 RX ADMIN — LEVETIRACETAM 500 MG: 100 SOLUTION ORAL at 10:16

## 2017-09-19 RX ADMIN — THIAMINE HYDROCHLORIDE 250 MG: 100 INJECTION, SOLUTION INTRAMUSCULAR; INTRAVENOUS at 10:16

## 2017-09-19 RX ADMIN — PHENYTOIN 100 MG: 125 SUSPENSION ORAL at 14:04

## 2017-09-19 RX ADMIN — PHENYTOIN 100 MG: 125 SUSPENSION ORAL at 05:21

## 2017-09-19 RX ADMIN — FAMOTIDINE 20 MG: 20 TABLET, FILM COATED ORAL at 21:35

## 2017-09-19 RX ADMIN — STANDARDIZED SENNA CONCENTRATE AND DOCUSATE SODIUM 2 TABLET: 8.6; 5 TABLET, FILM COATED ORAL at 21:34

## 2017-09-19 RX ADMIN — LEVETIRACETAM 500 MG: 100 SOLUTION ORAL at 21:35

## 2017-09-19 RX ADMIN — STANDARDIZED SENNA CONCENTRATE AND DOCUSATE SODIUM 2 TABLET: 8.6; 5 TABLET, FILM COATED ORAL at 10:16

## 2017-09-19 RX ADMIN — POTASSIUM CHLORIDE 40 MEQ: 1500 TABLET, EXTENDED RELEASE ORAL at 10:16

## 2017-09-19 RX ADMIN — ENOXAPARIN SODIUM 40 MG: 100 INJECTION SUBCUTANEOUS at 10:16

## 2017-09-19 RX ADMIN — FAMOTIDINE 20 MG: 20 TABLET, FILM COATED ORAL at 10:16

## 2017-09-19 RX ADMIN — POTASSIUM CHLORIDE 40 MEQ: 1500 TABLET, EXTENDED RELEASE ORAL at 21:31

## 2017-09-19 RX ADMIN — PHENYTOIN 100 MG: 125 SUSPENSION ORAL at 21:35

## 2017-09-19 ASSESSMENT — PAIN SCALES - GENERAL
PAINLEVEL_OUTOF10: 0

## 2017-09-19 ASSESSMENT — LIFESTYLE VARIABLES: DO YOU DRINK ALCOHOL: YES

## 2017-09-19 NOTE — PROGRESS NOTES
Internal Medicine Interval Note    Name Crescencio Eller     1955   Age/Sex 62 y.o. male   MRN [id number]   Code Status FULL     After 5PM or if no immediate response to page, please call for cross-coverage  Attending/Team: Dr. Sorensen/Hua See Patient List for primary contact information  Call [phone] to page    1st Call - Day Intern (R1):   Dr. Valenzuela 2nd Call - Day Sr. Resident (R2/R3):   Dr. Cavazos         Reason for interval visit  (Principal Problem)   Status epilepticus (CMS-[facility])    Interval Problem Daily Status Update  (24 hours)   No acute changes. Pt has not needed restraints. SW currently working on placement. As per note, Pt was denied from [facility] due to not being homebound and care exceeding capacity. Pt is clinically stable.     ROS   Unable to reliably assess.   Pt denies CP/SOB. No abdominal pain. No N/V/D. No GI complaints. Not coughing during exam    Consultants/Specialty  IM     Disposition  Inpatient    Quality Measures  Quality-Core Measures    Reviewed items::  Labs reviewed, Medications reviewed and Radiology images reviewed  DVT prophylaxis pharmacological::  Enoxaparin (Lovenox)  Benson to be d/c'ed today       Physical Exam       Vitals:    17 0400 17 0710 17 0715 17 1510   BP: 127/74 115/77 115/77 126/75   Pulse: 73 83 83 83   Resp:    Temp: 36.3 °C (97.4 °F) 36.7 °C (98 °F) 36.7 °C (98 °F) 36.4 °C (97.5 °F)   SpO2: 90% 90% 90% 92%   Weight:       Height:         Body mass index is 29.64 kg/m².    Oxygen Therapy:  Pulse Oximetry: 92 %, O2 (LPM): 0, O2 Delivery: None (Room Air)    Physical Exam  Constitutional: Watching TV. Exam unchanged from yesterday. No acute distress.  HENT:   Head: Normocephalic and atraumatic.   Eyes: Pupils are equal, round, and reactive to light. Right eye exhibits no discharge. No scleral icterus.   Neck: No JVD present.   Cardiovascular: RRR. No m/r/g.    Pulmonary/Chest: No respiratory  distress.   Musculoskeletal: He exhibits no edema or tenderness.   Skin: Skin is warm and dry.  Neuro: Inappropriately responds to most questions. Confused.     Lab Data Review:         9/18/2017  5:11 PM    Recent Labs      09/16/17 0449 09/17/17 0454 09/18/17   0342   SODIUM  141  140  141   POTASSIUM  3.1*  3.4*  3.5*   CHLORIDE  108  107  107   CO2  26  27  26   BUN  9  7*  5*   CREATININE  0.57  0.59  0.60   MAGNESIUM  2.2  2.1  2.0   CALCIUM  8.9  8.8  8.7       Recent Labs      09/16/17 0449 09/17/17 0454 09/18/17   0342   ALTSGPT  27  29   --    ASTSGOT  22  21   --    ALKPHOSPHAT  122*  122*   --    TBILIRUBIN  0.5  0.4   --    PREALBUMIN   --    --   15.0*   GLUCOSE  88  90  89       Recent Labs      09/16/17 0449 09/17/17 0454   RBC  3.59*  3.67*   HEMOGLOBIN  11.6*  11.9*   HEMATOCRIT  35.7*  36.1*   PLATELETCT  201  239       Recent Labs      09/16/17 0449 09/17/17 0454   WBC  3.7*  3.6*   NEUTSPOLYS  53.90  45.20   LYMPHOCYTES  26.00  35.00   MONOCYTES  13.40  11.80   EOSINOPHILS  5.40  6.90   BASOPHILS  0.80  0.80   ASTSGOT  22  21   ALTSGPT  27  29   ALKPHOSPHAT  122*  122*   TBILIRUBIN  0.5  0.4           Assessment/Plan     * Status epilepticus (CMS-HCC)   Assessment & Plan    No seizures overnight   Cont Keppra 500 mg b.i.d. & phenytoin 100 mg t.i.d. in PO form.  D/c hernandez today (9/18) per urology  Replacing lytes           Aspiration pneumonia (CMS-HCC)   Assessment & Plan    Patient is at high risk for aspiration pna with dysphagia, history of alcohol abuse, and seizures. 9/11 broncheoalveolar culture grew few Gram negative rods x1.  Patient has not had leukocytosis or clinical signs of infection. CXRs show some R lung base atelectasis.   - S/p 7 day course unasyn (last dose this am 9/16)  - Continue working with SLP          Alcohol withdrawal (CMS-HCC)   Assessment & Plan    Patient has a history of alcohol abuse. Patient reports drinking prior to presentation. Seizure  "likely 2/2 alcohol withdrawal. Pt's confusion persists, unable to determine baseline. Sister Daisy unable to provide clear details regarding baseline as he is \"always drunk,\" however sister Tila told RN he is at baseline after she spoke with him over the phone.  -Not agitated anymore, restraints have been removed 9/17/2017   -Mental status mostly at his baseline  -Attempting to treat potential reversible cause of confusion (Wernicke's) with high dose thiamine   -CTM            "

## 2017-09-19 NOTE — CARE PLAN
Problem: Safety  Goal: Will remain free from injury  Seizure, fall and aspiration precautions in place.    Problem: Venous Thromboembolism (VTW)/Deep Vein Thrombosis (DVT) Prevention:  Goal: Patient will participate in Venous Thrombosis (VTE)/Deep Vein Thrombosis (DVT)Prevention Measures  SCDs on, lovenox administered.

## 2017-09-19 NOTE — PROGRESS NOTES
Internal Medicine Interval Note    Name Crescencio Eller     1955   Age/Sex 62 y.o. male   MRN [id number]   Code Status FULL     After 5PM or if no immediate response to page, please call for cross-coverage  Attending/Team: Dr. Sorensen/Hua See Patient List for primary contact information  Call [phone] to page    1st Call - Day Intern (R1):   Dr. Valenzuela 2nd Call - Day Sr. Resident (R2/R3):   Dr. Cavazos         Reason for interval visit  (Principal Problem)   Status epilepticus (CMS-[facility])    Interval Problem Daily Status Update  (24 hours)   No acute events overnight. Patient is at baseline. No longer requires restraints. Pt is sitting comfortably, watching TV. Tolerating diet. No complaints at this time. Denies any pain.    Patient appears to be at baseline. He is not a good candidate for rehab as per physiatry and he has not been accepted at SNFs. Pt appears to be at baseline mentation and he is clinically stable. He came from home. Sister Daisy was contacted last week and felt he would be able to go home. As per patient and sister, he is able to perform his daily life functions. Patient was walked in the halls without a walker without any difficulty.     SW contacted sister Daisy to discuss d/c. [facility] from the [facility] should be able to pick him up tomorrow. D/c tentatively planned for tomorrow. Pt is clinically stable.    ROS  Unable to reliably assess.   Pt denies CP/SOB. No cough. No abdominal pain. No N/V/D. No urinary complaints.     Consultants/Specialty  IM    Disposition  Inpatient    Quality Measures  Quality-Core Measures  Reviewed items::  Labs reviewed, Medications reviewed and Radiology images reviewed  DVT prophylaxis pharmacological::  Enoxaparin (Lovenox)  No hernandez.      Physical Exam       Vitals:    17 1510 17 1936 17 0328 17 0710   BP: 126/75 118/77 138/83 139/77   Pulse: 83 89 90 75   Resp:    Temp: 36.4 °C (97.5 °F) 36.2 °C (97.2  °F) 36.4 °C (97.5 °F) 36.3 °C (97.3 °F)   SpO2: 92% 91% 90% 93%   Weight:       Height:         Body mass index is 29.64 kg/m².    Oxygen Therapy:  Pulse Oximetry: 93 %, O2 (LPM): 0, O2 Delivery: None (Room Air)    Physical Exam  Constitutional: Exam unchanged from yesterday. No acute distress. Mobile without walker/  HENT:   Head: Normocephalic and atraumatic.   Eyes: Pupils are equal, round, and reactive to light. Right eye exhibits no discharge. No scleral icterus.   Neck: No JVD present.   Cardiovascular: RRR. No m/r/g.    Pulmonary/Chest: No respiratory distress.   Musculoskeletal: He exhibits no edema or tenderness.   Skin: Skin is warm and dry.  Neuro: Pleasantly confused at times. Appears to be at baseline.     Lab Data Review:         9/19/2017  2:35 PM    Recent Labs      09/17/17 0454 09/18/17 0342 09/19/17   0636   SODIUM  140  141  139   POTASSIUM  3.4*  3.5*  4.1   CHLORIDE  107  107  105   CO2  27  26  29   BUN  7*  5*  5*   CREATININE  0.59  0.60  0.66   MAGNESIUM  2.1  2.0  2.1   CALCIUM  8.8  8.7  9.2       Recent Labs      09/17/17 0454 09/18/17 0342 09/19/17   0636   ALTSGPT  29   --   36   ASTSGOT  21   --   29   ALKPHOSPHAT  122*   --   135*   TBILIRUBIN  0.4   --   0.4   PREALBUMIN   --   15.0*   --    GLUCOSE  90  89  88       Recent Labs      09/17/17 0454   RBC  3.67*   HEMOGLOBIN  11.9*   HEMATOCRIT  36.1*   PLATELETCT  239       Recent Labs      09/17/17 0454 09/19/17   0636   WBC  3.6*   --    NEUTSPOLYS  45.20   --    LYMPHOCYTES  35.00   --    MONOCYTES  11.80   --    EOSINOPHILS  6.90   --    BASOPHILS  0.80   --    ASTSGOT  21  29   ALTSGPT  29  36   ALKPHOSPHAT  122*  135*   TBILIRUBIN  0.4  0.4           Assessment/Plan     * Status epilepticus (CMS-[facility])   Assessment & Plan     No seizures overnight   Cont Keppra 500 mg b.i.d. & phenytoin 100 mg t.i.d. in PO form.          Aspiration pneumonia (CMS-HCC)   Assessment & Plan     Patient is at high risk for aspiration  "pna with dysphagia, history of alcohol abuse, and seizures. 9/11 broncheoalveolar culture grew few Gram negative rods x1.  Patient has not had leukocytosis or clinical signs of infection.   - S/p 7 day course unasyn (last dose this am 9/16)  - Continue working with SLP         Alcohol withdrawal (CMS-HCC)   Assessment & Plan     Patient has a history of alcohol abuse. Patient reports drinking prior to presentation. Seizure likely 2/2 alcohol withdrawal. Pt's confusion persists, unable to determine baseline. Sister Daisy unable to provide clear details regarding baseline as he is \"always drunk,\" however sister told RN he is at baseline after she spoke with him over the phone.  -Not agitated anymore, restraints have been removed as of 9/17/2017   -Mental status mostly at his baseline  -C/w thiamine to treat potential reversible cause of confusion (Wernicke's)        "

## 2017-09-19 NOTE — DISCHARGE PLANNING
Medical Social Worker    RADHA received call from [facility] in [place], [place].     They have set transport up for pt for tomorrow afternoon to take him back home. The person picking up pt will be Traci Zhang.   RADHA informed RENATO Sequeira and stated that pt needs to have DC orders before 1200 on 9/20/17 so that they are able to transport him home.

## 2017-09-19 NOTE — DISCHARGE PLANNING
Medical Social Worker    RADHA paged RENATO Sequeira at [phone] so that that they may determine if pt is a candidate for HH as SNF will not accept.       Add:  RADHA spoke to RENATO Sequeira who stated that due to pt's sister stating that pt was at his baseline due to a past TBI she was comfortable with pt DCing home without HH. RADHA called pt's sister Daisy who stated she can set up transportation through their [facility] to come pick him up tomorrow. RADHA asked Daisy that she get the name of the person picking him up and the name of the [facility] so that we may DC them to said person tomorrow.     RADHA will await Daisy's call back with the name.

## 2017-09-19 NOTE — PROGRESS NOTES
Pt A&Ox3 (disoriented to time), denies any numbness/tingling/pain.    Bed alarm on, lap belt on, call light within reach, pt educated on importance of using call light when he needs assistance, pt verbalized understanding.

## 2017-09-19 NOTE — CARE PLAN
Problem: Safety  Goal: Will remain free from falls  Outcome: PROGRESSING AS EXPECTED    Intervention: Assess risk factors for falls  Bed locked in lowest position, call bell in hand, bed alarm on, non-skid socks in use.      Problem: Pain Management  Goal: Pain level will decrease to patient's comfort goal  Outcome: PROGRESSING AS EXPECTED    Intervention: Follow pain managment plan developed in collaboration with patient and Interdisciplinary Team  Patient denies pain or discomfort at this time.

## 2017-09-20 VITALS
RESPIRATION RATE: 17 BRPM | TEMPERATURE: 97.6 F | HEIGHT: 70 IN | SYSTOLIC BLOOD PRESSURE: 141 MMHG | HEART RATE: 79 BPM | BODY MASS INDEX: 29.57 KG/M2 | OXYGEN SATURATION: 90 % | DIASTOLIC BLOOD PRESSURE: 79 MMHG | WEIGHT: 206.57 LBS

## 2017-09-20 LAB
ALBUMIN SERPL BCP-MCNC: 3.8 G/DL (ref 3.2–4.9)
ALBUMIN/GLOB SERPL: 1.4 G/DL
ALP SERPL-CCNC: 139 U/L (ref 30–99)
ALT SERPL-CCNC: 36 U/L (ref 2–50)
ANION GAP SERPL CALC-SCNC: 10 MMOL/L (ref 0–11.9)
AST SERPL-CCNC: 25 U/L (ref 12–45)
BILIRUB SERPL-MCNC: 0.4 MG/DL (ref 0.1–1.5)
BUN SERPL-MCNC: 4 MG/DL (ref 8–22)
CALCIUM SERPL-MCNC: 8.9 MG/DL (ref 8.5–10.5)
CHLORIDE SERPL-SCNC: 105 MMOL/L (ref 96–112)
CO2 SERPL-SCNC: 24 MMOL/L (ref 20–33)
CREAT SERPL-MCNC: 0.57 MG/DL (ref 0.5–1.4)
GFR SERPL CREATININE-BSD FRML MDRD: >60 ML/MIN/1.73 M 2
GLOBULIN SER CALC-MCNC: 2.8 G/DL (ref 1.9–3.5)
GLUCOSE SERPL-MCNC: 93 MG/DL (ref 65–99)
MAGNESIUM SERPL-MCNC: 2.1 MG/DL (ref 1.5–2.5)
POTASSIUM SERPL-SCNC: 3.9 MMOL/L (ref 3.6–5.5)
PROT SERPL-MCNC: 6.6 G/DL (ref 6–8.2)
SODIUM SERPL-SCNC: 139 MMOL/L (ref 135–145)

## 2017-09-20 PROCEDURE — 700111 HCHG RX REV CODE 636 W/ 250 OVERRIDE (IP): Performed by: INTERNAL MEDICINE

## 2017-09-20 PROCEDURE — 700102 HCHG RX REV CODE 250 W/ 637 OVERRIDE(OP): Performed by: PHARMACIST

## 2017-09-20 PROCEDURE — 99239 HOSP IP/OBS DSCHRG MGMT >30: CPT | Mod: GC | Performed by: HOSPITALIST

## 2017-09-20 PROCEDURE — 83735 ASSAY OF MAGNESIUM: CPT

## 2017-09-20 PROCEDURE — A9270 NON-COVERED ITEM OR SERVICE: HCPCS | Performed by: INTERNAL MEDICINE

## 2017-09-20 PROCEDURE — 36415 COLL VENOUS BLD VENIPUNCTURE: CPT

## 2017-09-20 PROCEDURE — 700102 HCHG RX REV CODE 250 W/ 637 OVERRIDE(OP): Performed by: INTERNAL MEDICINE

## 2017-09-20 PROCEDURE — A9270 NON-COVERED ITEM OR SERVICE: HCPCS | Performed by: STUDENT IN AN ORGANIZED HEALTH CARE EDUCATION/TRAINING PROGRAM

## 2017-09-20 PROCEDURE — 80053 COMPREHEN METABOLIC PANEL: CPT

## 2017-09-20 PROCEDURE — 700102 HCHG RX REV CODE 250 W/ 637 OVERRIDE(OP): Performed by: STUDENT IN AN ORGANIZED HEALTH CARE EDUCATION/TRAINING PROGRAM

## 2017-09-20 PROCEDURE — A9270 NON-COVERED ITEM OR SERVICE: HCPCS | Performed by: PHARMACIST

## 2017-09-20 RX ORDER — LANOLIN ALCOHOL/MO/W.PET/CERES
100 CREAM (GRAM) TOPICAL DAILY
Qty: 30 TAB | Refills: 3 | Status: SHIPPED | OUTPATIENT
Start: 2017-09-20 | End: 2017-09-20

## 2017-09-20 RX ORDER — LEVETIRACETAM 100 MG/ML
500 SOLUTION ORAL EVERY 12 HOURS
Qty: 240 ML | Refills: 3 | Status: SHIPPED | OUTPATIENT
Start: 2017-09-20 | End: 2017-09-20

## 2017-09-20 RX ORDER — LEVETIRACETAM 100 MG/ML
500 SOLUTION ORAL EVERY 12 HOURS
Qty: 240 ML | Refills: 3 | Status: SHIPPED | OUTPATIENT
Start: 2017-09-20

## 2017-09-20 RX ORDER — PHENYTOIN 125 MG/5ML
100 SUSPENSION ORAL EVERY 8 HOURS
Qty: 1 BOTTLE | Refills: 3 | Status: SHIPPED | OUTPATIENT
Start: 2017-09-20 | End: 2017-09-20

## 2017-09-20 RX ORDER — LANOLIN ALCOHOL/MO/W.PET/CERES
100 CREAM (GRAM) TOPICAL DAILY
Qty: 30 TAB | Refills: 3 | Status: SHIPPED | OUTPATIENT
Start: 2017-09-20

## 2017-09-20 RX ORDER — FOLIC ACID 1 MG/1
1 TABLET ORAL DAILY
Qty: 30 TAB | Refills: 3 | Status: SHIPPED | OUTPATIENT
Start: 2017-09-20 | End: 2017-09-20

## 2017-09-20 RX ORDER — PHENYTOIN 125 MG/5ML
100 SUSPENSION ORAL EVERY 8 HOURS
Qty: 1 BOTTLE | Refills: 3 | Status: SHIPPED | OUTPATIENT
Start: 2017-09-20

## 2017-09-20 RX ORDER — FOLIC ACID 1 MG/1
1 TABLET ORAL DAILY
Qty: 30 TAB | Refills: 3 | Status: SHIPPED | OUTPATIENT
Start: 2017-09-20

## 2017-09-20 RX ADMIN — ENOXAPARIN SODIUM 40 MG: 100 INJECTION SUBCUTANEOUS at 08:36

## 2017-09-20 RX ADMIN — LEVETIRACETAM 500 MG: 100 SOLUTION ORAL at 08:36

## 2017-09-20 RX ADMIN — PHENYTOIN 100 MG: 125 SUSPENSION ORAL at 05:49

## 2017-09-20 RX ADMIN — FAMOTIDINE 20 MG: 20 TABLET, FILM COATED ORAL at 08:35

## 2017-09-20 RX ADMIN — POTASSIUM CHLORIDE 40 MEQ: 1500 TABLET, EXTENDED RELEASE ORAL at 08:35

## 2017-09-20 ASSESSMENT — PAIN SCALES - GENERAL: PAINLEVEL_OUTOF10: 0

## 2017-09-20 NOTE — DISCHARGE SUMMARY
Internal Medicine Discharge Summary      Admit Date:  9/9/2017       Discharge Date:  9/20/2017    Service:   [facility] Internal Medicine Green Team  Attending Physician(s):   Dr. Sorensen       Senior Resident(s):  Dr. Cavazos  Conor Resident(s):   Dr. Valenzuela      Primary Diagnosis:   Status epilepticus     Secondary Diagnoses:                Alcohol withdrawal  Aspiration pneumonia    Hospital Summary (Brief Narrative):          62-year-old male admitted 9/9/2017 for status epilepticus thought to be secondary to alcohol withdrawal and history of TBI. CT head was negative for structural abnormalities to explain seizure activity on admission.     Regarding seizures, neurology was consulted and started phenytoin and keppra. He was intubated for airway protection. He had an EEG done on Sep 11, and it showed an abnormal video EEG recording in the awake, and sleep state(s), due to excessive fast background activity, which can be seen with the use of sedatives. No seizures were captured. He was extubated 9/12/2017. Patient was transferred to the floor on 9/13/2017.     Regarding suspected aspiration pneumonia: Given history of alcohol abuse, and seizures, and intubation, patient was at high risk for aspiration pneumonia. 9/11 broncheoalveolar culture grew few Gram negative rods x1.  Patient did not have leukocytosis or clinical signs of infection. CXR on 9/10 showed some R lung base atelectasis. He received a 7 day course unasyn (last dose this am 9/16). He also followed up with Speech-Language Pathology. Clinically stable.     Benson catheter was placed on 9/11 following traumatic catheter removal by the patient and it was removed 9/18 as per recommendation of urology.      Regarding mental status, discussion with family (sister Daisy) revealed patient at baseline. Patient appears to be confused at baseline, but is functional in activities of daily life. Differential of his ongoing confusion is broad and includes  underlying dementia related to chronic alcohol abuse, encephalomalacia from previous traumatic hemorrhage in area of known encephalomalacia, acute illness with sleep deprivation, medication side effect. Wernicke's encephalopathy was a potentially reversible cause of dementia, and he was started on high dose thiamine IV on 9/15. On 9/19, patient was walked with team, and it was noted that he was able to walk stably without a walker. He is medically clear for discharge.     Consultants:     Neurology  Urology    Procedures:        Intubated 9/11, extubated 9/12    Imaging/ Testing:      DX-CHEST-PORTABLE (1 VIEW)   Final Result      1.  Supportive tubing as described above.   2.  No other significant change from prior exam.         DX-CHEST-PORTABLE (1 VIEW)   Final Result      Stable examination.      DX-CHEST-PORTABLE (1 VIEW)   Final Result      Increased right basilar atelectasis.      DX-CHEST-PORTABLE (1 VIEW)   Final Result      1.  Slight increased inflation with improvement of RIGHT lung base atelectasis.   2.  Supportive tubing as described above.      DX-ABDOMEN FOR TUBE PLACEMENT   Final Result      Feeding tube tip overlies the proximal duodenum.      OUTSIDE IMAGES-DX CHEST   Final Result      CT-HEAD W/O   Final Result      Bitemporal encephalomalacia, left greater than right without evidence of intracranial hemorrhage, midline shift or mass effect.      DX-CHEST-PORTABLE (1 VIEW)   Final Result      1.  Bibasilar underinflation atelectasis which could obscure an additional process.   2.  Endotracheal tube tip terminates at the level of the aortic arch          Discharge Medications:         Medication Reconciliation: Completed       Medication List      START taking these medications      Instructions   folic acid 1 MG Tabs  Commonly known as:  FOLVITE   Take 1 Tab by mouth every day.  Dose:  1 mg     levetiracetam 100 MG/ML Soln  Commonly known as:  KEPPRA   Take 5 mL by mouth every 12 hours.  Dose:   500 mg     phenytoin 100 MG/4ML Susp  Commonly known as:  DILANTIN   Take 4 mL by mouth every 8 hours.  Dose:  100 mg     thiamine 100 MG tablet  Commonly known as:  THIAMINE   Take 1 Tab by mouth every day.  Dose:  100 mg        CONTINUE taking these medications      Instructions   COLACE 100 MG Caps  Generic drug:  docusate sodium   Take 100 mg by mouth 2 times a day. TAKE ONE CAP WITH BREAKFAST AND DINNER. STOOL SOFTENER-HOLD FOR LOOSE STOOLS.  Dose:  100 mg     metoprolol 25 MG Tabs  Commonly known as:  LOPRESSOR   Take  by mouth. TAKE ONE TAB EVERY MORNING TO CONTROL BLOOD PRESSURE     PEPCID 20 MG Tabs  Generic drug:  famotidine   Take  by mouth. TAKE ONE TAB TWO TIMES DAILY BEFORE MEALS FOR INDIGESTION     THERAGRAN-M PO   Take  by mouth. MULTIPLE VITAMIN-TAKE ONE EVERY DAY WITH LUNCH        STOP taking these medications    phenytoin 300 MG ER capsule  Commonly known as:  DILANTIN            Can use .DISCHARGEMEDSLIST if going to another facility         Disposition:   Home    Diet:   As tolerated, dysphagia/nectar thick    Activity:   As tolerated    Instructions:        The patient was instructed to return to the ER in the event of worsening symptoms. I have counseled the patient on the importance of compliance and the patient has agreed to proceed with all medical recommendations and follow up plan indicated above.   The patient understands that all medications come with benefits and risks. Risks may include permanent injury or death and these risks can be minimized with close reassessment and monitoring.        Pending Studies:        None    Time spent on discharge day patient visit, preparing discharge paperwork and arranging for patient follow up.    Summary of follow up issues:   Please follow up with PMD  Please follow up with Neurology Outpatient    Please continue taking anti-seizure medications outpatient (Keppra and dilantin) and follow up with your neurologist.    Please start taking thiamine  and folate as instructed daily.     We highly encourage alcohol cessation.     Discharge Time (Minutes) :    60        Condition on Discharge    ______________________________________________________________________    Interval history/exam for day of discharge:    Stable    Vitals:    09/19/17 1510 09/19/17 2000 09/20/17 0400 09/20/17 0710   BP: 129/82 132/76 140/80 141/79   Pulse: 86 82 83 79   Resp: 16 16 16 17   Temp: 36.2 °C (97.1 °F) 36.2 °C (97.2 °F) 36.2 °C (97.2 °F) 36.4 °C (97.6 °F)   SpO2: 90% 92% 90% 90%   Weight:       Height:         Weight/BMI: Body mass index is 29.64 kg/m².  Pulse Oximetry: 90 %, O2 (LPM): 0, O2 Delivery: None (Room Air)    General: NAD  CVS: RRR  PULM: CTAB    Most Recent Labs:    Lab Results   Component Value Date/Time    WBC 3.6 (L) 09/17/2017 04:54 AM    RBC 3.67 (L) 09/17/2017 04:54 AM    HEMOGLOBIN 11.9 (L) 09/17/2017 04:54 AM    HEMATOCRIT 36.1 (L) 09/17/2017 04:54 AM    MCV 98.4 (H) 09/17/2017 04:54 AM    MCH 32.4 09/17/2017 04:54 AM    MCHC 33.0 (L) 09/17/2017 04:54 AM    MPV 8.7 (L) 09/17/2017 04:54 AM    NEUTSPOLYS 45.20 09/17/2017 04:54 AM    LYMPHOCYTES 35.00 09/17/2017 04:54 AM    MONOCYTES 11.80 09/17/2017 04:54 AM    EOSINOPHILS 6.90 09/17/2017 04:54 AM    BASOPHILS 0.80 09/17/2017 04:54 AM      Lab Results   Component Value Date/Time    SODIUM 139 09/20/2017 04:58 AM    POTASSIUM 3.9 09/20/2017 04:58 AM    CHLORIDE 105 09/20/2017 04:58 AM    CO2 24 09/20/2017 04:58 AM    GLUCOSE 93 09/20/2017 04:58 AM    BUN 4 (L) 09/20/2017 04:58 AM    CREATININE 0.57 09/20/2017 04:58 AM      Lab Results   Component Value Date/Time    ALTSGPT 36 09/20/2017 04:58 AM    ASTSGOT 25 09/20/2017 04:58 AM    ALKPHOSPHAT 139 (H) 09/20/2017 04:58 AM    TBILIRUBIN 0.4 09/20/2017 04:58 AM    DBILIRUBIN 0.1 06/08/2010 03:05 AM    ALBUMIN 3.8 09/20/2017 04:58 AM    GLOBULIN 2.8 09/20/2017 04:58 AM    PREALBUMIN 15.0 (L) 09/18/2017 03:42 AM    INR 0.86 (L) 09/09/2017 07:00 AM    MACROCYTOSIS 1+  06/04/2010 03:00 AM     Lab Results   Component Value Date/Time    PROTHROMBTM 12.0 09/09/2017 07:00 AM    INR 0.86 (L) 09/09/2017 07:00 AM

## 2017-09-20 NOTE — CARE PLAN
Problem: Safety  Goal: Will remain free from injury  Outcome: PROGRESSING AS EXPECTED    Patient remain free from injury/fall. Bed alarm on, educated patient to call for assistance. Call light within and belongings within reach, bed at lowest level and rails up.    Problem: Infection  Goal: Will remain free from infection  Outcome: PROGRESSING AS EXPECTED    Patient afebrile, patient neuro status remains at baseline (AO x 3),  IV line is CDI with no signs of infection. Patient voids well with no noted pain or irritation on urination. Skin intact.     Problem: Discharge Barriers/Planning  Goal: Patient's continuum of care needs will be met  Outcome: PROGRESSING AS EXPECTED    Discussed POC with patient in regards to discharge planning. Patient verbalized understanding

## 2017-09-20 NOTE — CARE PLAN
Problem: Safety  Goal: Will remain free from falls  Bed alarm on, lap belt in place, call light within reach, pt educated on importance of using call light when he needs to get OOB, appropriate staff assistance provided when pt is OOB to ensure safety.    Problem: Venous Thromboembolism (VTW)/Deep Vein Thrombosis (DVT) Prevention:  Goal: Patient will participate in Venous Thrombosis (VTE)/Deep Vein Thrombosis (DVT)Prevention Measures  SCDs on, Lovenox administered.

## 2017-09-20 NOTE — PROGRESS NOTES
Patient report received. Assumed care.    Patient Alert and oriented x 2, no distress, no pain reported. Patient breathing normally.     No reported N/T. Strength equal BUE and BLE.    Bowel sounds hyperactive, patient voiding well.    Lap belt on. Skin intact.    POC discussed, no further needs at this time, call light within reach, bed alarm armed.

## 2017-09-20 NOTE — PROGRESS NOTES
Pt discharged to home. Taken to car with family member via. No changes from morning assessment. Pt and family member read and understood discharge instructions. Ying Cyr R.N.

## 2017-09-20 NOTE — DISCHARGE INSTRUCTIONS
Discharge Instructions    Discharged to home by car with relative. Discharged via wheelchair, hospital escort: Yes.  Special equipment needed: Not Applicable    Be sure to schedule a follow-up appointment with your primary care doctor or any specialists as instructed.     Discharge Plan:   Diet Plan: Discussed  Activity Level: Discussed  Confirmed Follow up Appointment: Patient to Call and Schedule Appointment  Confirmed Symptoms Management: Discussed  Medication Reconciliation Updated: Yes  Influenza Vaccine Indication: Patient Refuses    I understand that a diet low in cholesterol, fat, and sodium is recommended for good health. Unless I have been given specific instructions below for another diet, I accept this instruction as my diet prescription.   Other diet: Nectar thick liquids    Special Instructions:  Follow up with a neurologist    · Is patient discharged on Warfarin / Coumadin?   No     · Is patient Post Blood Transfusion?  No    Depression / Suicide Risk    As you are discharged from this [facility] facility, it is important to learn how to keep safe from harming yourself.    Recognize the warning signs:  · Abrupt changes in personality, positive or negative- including increase in energy   · Giving away possessions  · Change in eating patterns- significant weight changes-  positive or negative  · Change in sleeping patterns- unable to sleep or sleeping all the time   · Unwillingness or inability to communicate  · Depression  · Unusual sadness, discouragement and loneliness  · Talk of wanting to die  · Neglect of personal appearance   · Rebelliousness- reckless behavior  · Withdrawal from people/activities they love  · Confusion- inability to concentrate     If you or a loved one observes any of these behaviors or has concerns about self-harm, here's what you can do:  · Talk about it- your feelings and reasons for harming yourself  · Remove any means that you might use to hurt yourself (examples:  pills, rope, extension cords, firearm)  · Get professional help from the community (Mental Health, Substance Abuse, psychological counseling)  · Do not be alone:Call your Safe Contact- someone whom you trust who will be there for you.  · Call your local CRISIS HOTLINE 804-9118 or 714-469-6289  · Call your local Children's Mobile Crisis Response Team [place] (667) 555-1099 or www.[employer]  · Call the toll free National Suicide Prevention Hotlines   · National Suicide Prevention Lifeline 601-025-WBBM (4296)  · National Hope Line Network 800-SUICIDE (406-8179)

## 2017-10-11 LAB
FUNGUS SPEC CULT: NORMAL
SIGNIFICANT IND 70042: NORMAL
SITE SITE: NORMAL
SOURCE SOURCE: NORMAL

## 2017-11-06 LAB
MYCOBACTERIUM SPEC CULT: NORMAL
RHODAMINE-AURAMINE STN SPEC: NORMAL
SIGNIFICANT IND 70042: NORMAL
SITE SITE: NORMAL
SOURCE SOURCE: NORMAL

## 2018-01-25 ENCOUNTER — OFFICE VISIT (OUTPATIENT)
Dept: NEUROLOGY | Facility: MEDICAL CENTER | Age: 63
End: 2018-01-25
Payer: MEDICARE

## 2018-01-25 VITALS
SYSTOLIC BLOOD PRESSURE: 136 MMHG | DIASTOLIC BLOOD PRESSURE: 70 MMHG | WEIGHT: 227.3 LBS | TEMPERATURE: 98.7 F | BODY MASS INDEX: 34.45 KG/M2 | OXYGEN SATURATION: 94 % | HEART RATE: 78 BPM | HEIGHT: 68 IN | RESPIRATION RATE: 16 BRPM

## 2018-01-25 DIAGNOSIS — G40.901 STATUS EPILEPTICUS (HCC): ICD-10-CM

## 2018-01-25 DIAGNOSIS — G31.09 OTHER FRONTOTEMPORAL DEMENTIA WITHOUT BEHAVIORAL DISTURBANCE: ICD-10-CM

## 2018-01-25 DIAGNOSIS — F02.80 OTHER FRONTOTEMPORAL DEMENTIA WITHOUT BEHAVIORAL DISTURBANCE: ICD-10-CM

## 2018-01-25 PROBLEM — F03.90 DEMENTIA (HCC): Status: ACTIVE | Noted: 2018-01-25

## 2018-01-25 PROCEDURE — 99204 OFFICE O/P NEW MOD 45 MIN: CPT | Performed by: PSYCHIATRY & NEUROLOGY
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
## 2018-01-25 NOTE — PATIENT INSTRUCTIONS
IMPRESSION:      1. Cognitive Decline-- could not even register information, could not give us reliable history-- dementia-- alcoholic induced?  2. Hx of brain injury and seizure  3. Hx of alcoholism  4. Right hemiparesis-- chronic? Due to head injury    PLAN/RECOMMENDATIONS:    Advise  intervention-- it does not sound safe for the patient to live alone-- because of poor memory and inability to register new information  We will offer the patient MRI of brain and EEG  We will ask the  to intervene  In the meantime, we will not change the seizure medication      SIGNATURE:  Fan Salcedo

## 2018-01-29 ENCOUNTER — PATIENT OUTREACH (OUTPATIENT)
Dept: HEALTH INFORMATION MANAGEMENT | Facility: OTHER | Age: 63
End: 2018-01-29

## 2018-01-30 NOTE — PROGRESS NOTES
"Pt referred to  CC services by neurologist, Dr Slacedo. Pt is non-ACO Medicare. Referral indicated concerns related to pt's ability to live independently with dx of dementia \"does not sound safe for the patient to live alone-- because of poor memory and inability to register new information\".  Pt's phone number on file is no longer in service per recording when called.     Outreach call to [facility] [phone] to determine if pt follows at clinic and if services can be offered though clinic. Left VM on RN line and with Community Health Representative Nicole Poole to potential collaborate care.     Referral placed to [facility] Josette Duque [phone] to request  evaluation and assessment of social needs/potential referral to Kids to Seniors program with [facility]. Provided NORTH Duque some demographic information and brief reason for referral. LSW sent referral to NORTH Duque via secure email.     Plan:  · [facility] NORTH Duque to follow-up with pt to discuss referral and identified needs  "

## 2018-02-01 ENCOUNTER — PATIENT OUTREACH (OUTPATIENT)
Dept: HEALTH INFORMATION MANAGEMENT | Facility: OTHER | Age: 63
End: 2018-02-01

## 2018-02-01 NOTE — PROGRESS NOTES
Pt referred to  CC services by neurologist, Dr Salcedo. Pt is non-ACO Medicare.  2nd attempt to reach pt however pt's phone number on file is no longer in service per recording when called.      2nd attempt to reach call to [facility] [phone] to determine if pt follows at clinic and if services can be offered though clinic. Left 2nd VM on RN line and with Community Health Representative Nicole Poole to potential collaborate care. LSW has not heard back from either since first attempt.      [phone] to request  evaluation and assessment of social needs/potential referral to Kids to Seniors program with [facility]. Provided NORTH Duque some demographic information and brief reason for referral. LSW sent referral to NORTH Duque via secure email.      Plan:  · [facility] NORTH Duque to follow-up with pt to discuss referral and identified needs  · Referral at this time will be closed by care coordination team due to lack of ability to reach pt.

## 2018-02-02 ENCOUNTER — PATIENT OUTREACH (OUTPATIENT)
Dept: HEALTH INFORMATION MANAGEMENT | Facility: OTHER | Age: 63
End: 2018-02-02

## 2018-02-06 ENCOUNTER — PATIENT OUTREACH (OUTPATIENT)
Dept: HEALTH INFORMATION MANAGEMENT | Facility: OTHER | Age: 63
End: 2018-02-06

## 2018-02-06 NOTE — PROGRESS NOTES
Received a return call from Nicole Poole, Critical access hospital Health Representative with the [facility] [phone]. She reported that pt does follow at clinic for primary care medical needs. Discussed concerns from neurologist regarding pt's ability to live independently and determine if there is any current support. Community Health Representative reported she was not currently aware of support in place for pt but would look into options and follow up. Also indicated she can go into the home to check on patients, check vitals and assist with setting up services with order from providers. Indicated that if pt is in need of services she will request order from pt's PCP at the [facility].     Plan:  · LSW will not actively follow pt at this time; current evaluation and assessment of social needs to be addressed by [facility]. Referral placed.   · Message routed to neurologist to provide update.

## 2018-02-22 ENCOUNTER — TELEPHONE (OUTPATIENT)
Dept: NEUROLOGY | Facility: MEDICAL CENTER | Age: 63
End: 2018-02-22

## 2018-02-22 NOTE — TELEPHONE ENCOUNTER
How about sending a letter telling the patient that we could not schedule test since his phone is not working    Johana Salcedo M.D.             We have received your request for this patient to have an MRI Brain However, this patient is not scheduled as the phone number provided is incorrect and we are unable to contact this patient. Please resubmit with the updated information or have the patient contact us directly at [phone]. Thank You!     Imaging Scheduling

## 2018-03-12 NOTE — PROGRESS NOTES
Josette Duque of [employer] called [phone], but number is invalid.  Since client lives in [place] and unable to reach, will be unable to follow client for case management.  NORTH Thornton

## 2018-05-06 ENCOUNTER — HOSPITAL ENCOUNTER (INPATIENT)
Facility: MEDICAL CENTER | Age: 63
LOS: 8 days | DRG: 101 | End: 2018-05-14
Attending: HOSPITALIST | Admitting: HOSPITALIST
Payer: MEDICARE

## 2018-05-06 ENCOUNTER — HOSPITAL ENCOUNTER (OUTPATIENT)
Facility: MEDICAL CENTER | Age: 63
DRG: 101 | End: 2018-05-06
Admitting: HOSPITALIST
Payer: MEDICARE

## 2018-05-06 DIAGNOSIS — I63.9 CEREBROVASCULAR ACCIDENT (CVA), UNSPECIFIED MECHANISM (HCC): Chronic | ICD-10-CM

## 2018-05-06 PROCEDURE — 99223 1ST HOSP IP/OBS HIGH 75: CPT | Performed by: HOSPITALIST

## 2018-05-06 PROCEDURE — 770006 HCHG ROOM/CARE - MED/SURG/GYN SEMI*

## 2018-05-06 PROCEDURE — HZ2ZZZZ DETOXIFICATION SERVICES FOR SUBSTANCE ABUSE TREATMENT: ICD-10-PCS | Performed by: HOSPITALIST

## 2018-05-06 PROCEDURE — 770020 HCHG ROOM/CARE - TELE (206)

## 2018-05-06 RX ORDER — ATORVASTATIN CALCIUM 80 MG/1
80 TABLET, FILM COATED ORAL EVERY EVENING
Status: DISCONTINUED | OUTPATIENT
Start: 2018-05-07 | End: 2018-05-07

## 2018-05-06 RX ORDER — LORAZEPAM 1 MG/1
2 TABLET ORAL
Status: DISCONTINUED | OUTPATIENT
Start: 2018-05-06 | End: 2018-05-14 | Stop reason: HOSPADM

## 2018-05-06 RX ORDER — LORAZEPAM 2 MG/ML
1 INJECTION INTRAMUSCULAR
Status: DISCONTINUED | OUTPATIENT
Start: 2018-05-06 | End: 2018-05-14 | Stop reason: HOSPADM

## 2018-05-06 RX ORDER — HYDRALAZINE HYDROCHLORIDE 20 MG/ML
10 INJECTION INTRAMUSCULAR; INTRAVENOUS
Status: DISCONTINUED | OUTPATIENT
Start: 2018-05-06 | End: 2018-05-14 | Stop reason: HOSPADM

## 2018-05-06 RX ORDER — BISACODYL 10 MG
10 SUPPOSITORY, RECTAL RECTAL
Status: DISCONTINUED | OUTPATIENT
Start: 2018-05-06 | End: 2018-05-14 | Stop reason: HOSPADM

## 2018-05-06 RX ORDER — PROMETHAZINE HYDROCHLORIDE 25 MG/1
12.5-25 SUPPOSITORY RECTAL EVERY 4 HOURS PRN
Status: DISCONTINUED | OUTPATIENT
Start: 2018-05-06 | End: 2018-05-14 | Stop reason: HOSPADM

## 2018-05-06 RX ORDER — LORAZEPAM 2 MG/ML
1.5 INJECTION INTRAMUSCULAR
Status: DISCONTINUED | OUTPATIENT
Start: 2018-05-06 | End: 2018-05-14 | Stop reason: HOSPADM

## 2018-05-06 RX ORDER — LABETALOL HYDROCHLORIDE 5 MG/ML
10 INJECTION, SOLUTION INTRAVENOUS EVERY 4 HOURS PRN
Status: DISCONTINUED | OUTPATIENT
Start: 2018-05-06 | End: 2018-05-14 | Stop reason: HOSPADM

## 2018-05-06 RX ORDER — SODIUM CHLORIDE 9 MG/ML
INJECTION, SOLUTION INTRAVENOUS CONTINUOUS
Status: DISCONTINUED | OUTPATIENT
Start: 2018-05-07 | End: 2018-05-11

## 2018-05-06 RX ORDER — CLONIDINE HYDROCHLORIDE 0.1 MG/1
0.1 TABLET ORAL EVERY 6 HOURS PRN
Status: DISCONTINUED | OUTPATIENT
Start: 2018-05-06 | End: 2018-05-14 | Stop reason: HOSPADM

## 2018-05-06 RX ORDER — LORAZEPAM 2 MG/ML
0.5 INJECTION INTRAMUSCULAR EVERY 4 HOURS PRN
Status: DISCONTINUED | OUTPATIENT
Start: 2018-05-06 | End: 2018-05-14 | Stop reason: HOSPADM

## 2018-05-06 RX ORDER — ASPIRIN 81 MG/1
324 TABLET, CHEWABLE ORAL DAILY
Status: DISCONTINUED | OUTPATIENT
Start: 2018-05-07 | End: 2018-05-14 | Stop reason: HOSPADM

## 2018-05-06 RX ORDER — LORAZEPAM 1 MG/1
1 TABLET ORAL EVERY 4 HOURS PRN
Status: DISCONTINUED | OUTPATIENT
Start: 2018-05-06 | End: 2018-05-14 | Stop reason: HOSPADM

## 2018-05-06 RX ORDER — THIAMINE MONONITRATE (VIT B1) 100 MG
100 TABLET ORAL DAILY
Status: DISCONTINUED | OUTPATIENT
Start: 2018-05-07 | End: 2018-05-06

## 2018-05-06 RX ORDER — ONDANSETRON 2 MG/ML
4 INJECTION INTRAMUSCULAR; INTRAVENOUS EVERY 4 HOURS PRN
Status: DISCONTINUED | OUTPATIENT
Start: 2018-05-06 | End: 2018-05-14 | Stop reason: HOSPADM

## 2018-05-06 RX ORDER — ACETAMINOPHEN 325 MG/1
650 TABLET ORAL EVERY 6 HOURS PRN
Status: DISCONTINUED | OUTPATIENT
Start: 2018-05-06 | End: 2018-05-14 | Stop reason: HOSPADM

## 2018-05-06 RX ORDER — LEVETIRACETAM 100 MG/ML
500 SOLUTION ORAL EVERY 12 HOURS
Status: DISCONTINUED | OUTPATIENT
Start: 2018-05-07 | End: 2018-05-14 | Stop reason: HOSPADM

## 2018-05-06 RX ORDER — PHENYTOIN 125 MG/5ML
100 SUSPENSION ORAL EVERY 8 HOURS
Status: DISCONTINUED | OUTPATIENT
Start: 2018-05-07 | End: 2018-05-14 | Stop reason: HOSPADM

## 2018-05-06 RX ORDER — ONDANSETRON 4 MG/1
4 TABLET, ORALLY DISINTEGRATING ORAL EVERY 4 HOURS PRN
Status: DISCONTINUED | OUTPATIENT
Start: 2018-05-06 | End: 2018-05-14 | Stop reason: HOSPADM

## 2018-05-06 RX ORDER — POLYETHYLENE GLYCOL 3350 17 G/17G
1 POWDER, FOR SOLUTION ORAL
Status: DISCONTINUED | OUTPATIENT
Start: 2018-05-06 | End: 2018-05-14 | Stop reason: HOSPADM

## 2018-05-06 RX ORDER — LORAZEPAM 2 MG/ML
2 INJECTION INTRAMUSCULAR
Status: DISCONTINUED | OUTPATIENT
Start: 2018-05-06 | End: 2018-05-14 | Stop reason: HOSPADM

## 2018-05-06 RX ORDER — PROMETHAZINE HYDROCHLORIDE 25 MG/1
12.5-25 TABLET ORAL EVERY 4 HOURS PRN
Status: DISCONTINUED | OUTPATIENT
Start: 2018-05-06 | End: 2018-05-14 | Stop reason: HOSPADM

## 2018-05-06 RX ORDER — LORAZEPAM 1 MG/1
0.5 TABLET ORAL EVERY 4 HOURS PRN
Status: DISCONTINUED | OUTPATIENT
Start: 2018-05-06 | End: 2018-05-14 | Stop reason: HOSPADM

## 2018-05-06 RX ORDER — THIAMINE MONONITRATE (VIT B1) 100 MG
100 TABLET ORAL DAILY
Status: COMPLETED | OUTPATIENT
Start: 2018-05-07 | End: 2018-05-10

## 2018-05-06 RX ORDER — ASPIRIN 300 MG/1
300 SUPPOSITORY RECTAL DAILY
Status: DISCONTINUED | OUTPATIENT
Start: 2018-05-07 | End: 2018-05-14 | Stop reason: HOSPADM

## 2018-05-06 RX ORDER — LORAZEPAM 1 MG/1
3 TABLET ORAL
Status: DISCONTINUED | OUTPATIENT
Start: 2018-05-06 | End: 2018-05-14 | Stop reason: HOSPADM

## 2018-05-06 RX ORDER — FOLIC ACID 1 MG/1
1 TABLET ORAL DAILY
Status: COMPLETED | OUTPATIENT
Start: 2018-05-07 | End: 2018-05-10

## 2018-05-06 RX ORDER — POTASSIUM CHLORIDE 7.45 MG/ML
10 INJECTION INTRAVENOUS ONCE
Status: COMPLETED | OUTPATIENT
Start: 2018-05-07 | End: 2018-05-07

## 2018-05-06 RX ORDER — AMOXICILLIN 250 MG
2 CAPSULE ORAL 2 TIMES DAILY
Status: DISCONTINUED | OUTPATIENT
Start: 2018-05-07 | End: 2018-05-14 | Stop reason: HOSPADM

## 2018-05-06 RX ORDER — ASPIRIN 325 MG
325 TABLET ORAL DAILY
Status: DISCONTINUED | OUTPATIENT
Start: 2018-05-07 | End: 2018-05-14 | Stop reason: HOSPADM

## 2018-05-06 RX ORDER — LORAZEPAM 1 MG/1
4 TABLET ORAL
Status: DISCONTINUED | OUTPATIENT
Start: 2018-05-06 | End: 2018-05-14 | Stop reason: HOSPADM

## 2018-05-07 ENCOUNTER — APPOINTMENT (OUTPATIENT)
Dept: RADIOLOGY | Facility: MEDICAL CENTER | Age: 63
DRG: 101 | End: 2018-05-07
Attending: HOSPITALIST
Payer: MEDICARE

## 2018-05-07 PROBLEM — I63.9 CVA (CEREBRAL VASCULAR ACCIDENT) (HCC): Status: ACTIVE | Noted: 2018-05-07

## 2018-05-07 PROBLEM — Z86.73 HISTORY OF CVA (CEREBROVASCULAR ACCIDENT): Status: ACTIVE | Noted: 2018-05-07

## 2018-05-07 PROBLEM — G40.909 SEIZURE DISORDER (HCC): Status: ACTIVE | Noted: 2018-05-07

## 2018-05-07 PROBLEM — I63.9 CVA (CEREBRAL VASCULAR ACCIDENT) (HCC): Chronic | Status: ACTIVE | Noted: 2018-05-07

## 2018-05-07 LAB
ANION GAP SERPL CALC-SCNC: 8 MMOL/L (ref 0–11.9)
BASOPHILS # BLD AUTO: 1 % (ref 0–1.8)
BASOPHILS # BLD: 0.05 K/UL (ref 0–0.12)
BUN SERPL-MCNC: 3 MG/DL (ref 8–22)
CALCIUM SERPL-MCNC: 8.9 MG/DL (ref 8.5–10.5)
CHLORIDE SERPL-SCNC: 93 MMOL/L (ref 96–112)
CHOLEST SERPL-MCNC: 240 MG/DL (ref 100–199)
CO2 SERPL-SCNC: 30 MMOL/L (ref 20–33)
CREAT SERPL-MCNC: 0.55 MG/DL (ref 0.5–1.4)
EKG IMPRESSION: NORMAL
EOSINOPHIL # BLD AUTO: 0.24 K/UL (ref 0–0.51)
EOSINOPHIL NFR BLD: 4.9 % (ref 0–6.9)
ERYTHROCYTE [DISTWIDTH] IN BLOOD BY AUTOMATED COUNT: 47.9 FL (ref 35.9–50)
GLUCOSE SERPL-MCNC: 90 MG/DL (ref 65–99)
HCT VFR BLD AUTO: 34.6 % (ref 42–52)
HDLC SERPL-MCNC: 98 MG/DL
HGB BLD-MCNC: 12.5 G/DL (ref 14–18)
IMM GRANULOCYTES # BLD AUTO: 0.05 K/UL (ref 0–0.11)
IMM GRANULOCYTES NFR BLD AUTO: 1 % (ref 0–0.9)
LDLC SERPL CALC-MCNC: 126 MG/DL
LV EJECT FRACT  99904: 65
LV EJECT FRACT MOD 4C 99902: 73.26
LYMPHOCYTES # BLD AUTO: 1.06 K/UL (ref 1–4.8)
LYMPHOCYTES NFR BLD: 21.8 % (ref 22–41)
MAGNESIUM SERPL-MCNC: 2.3 MG/DL (ref 1.5–2.5)
MCH RBC QN AUTO: 32.8 PG (ref 27–33)
MCHC RBC AUTO-ENTMCNC: 36.1 G/DL (ref 33.7–35.3)
MCV RBC AUTO: 90.8 FL (ref 81.4–97.8)
MONOCYTES # BLD AUTO: 0.37 K/UL (ref 0–0.85)
MONOCYTES NFR BLD AUTO: 7.6 % (ref 0–13.4)
NEUTROPHILS # BLD AUTO: 3.1 K/UL (ref 1.82–7.42)
NEUTROPHILS NFR BLD: 63.7 % (ref 44–72)
NRBC # BLD AUTO: 0 K/UL
NRBC BLD-RTO: 0 /100 WBC
PHOSPHATE SERPL-MCNC: 3.4 MG/DL (ref 2.5–4.5)
PLATELET # BLD AUTO: 245 K/UL (ref 164–446)
PMV BLD AUTO: 7.1 FL (ref 9–12.9)
POTASSIUM SERPL-SCNC: 3.5 MMOL/L (ref 3.6–5.5)
RBC # BLD AUTO: 3.81 M/UL (ref 4.7–6.1)
SODIUM SERPL-SCNC: 131 MMOL/L (ref 135–145)
TRIGL SERPL-MCNC: 79 MG/DL (ref 0–149)
TROPONIN I SERPL-MCNC: <0.01 NG/ML (ref 0–0.04)
WBC # BLD AUTO: 4.9 K/UL (ref 4.8–10.8)

## 2018-05-07 PROCEDURE — 93010 ELECTROCARDIOGRAM REPORT: CPT | Performed by: INTERNAL MEDICINE

## 2018-05-07 PROCEDURE — 4A10X4Z MONITORING OF CENTRAL NERVOUS ELECTRICAL ACTIVITY, EXTERNAL APPROACH: ICD-10-PCS | Performed by: PSYCHIATRY & NEUROLOGY

## 2018-05-07 PROCEDURE — 770020 HCHG ROOM/CARE - TELE (206)

## 2018-05-07 PROCEDURE — G8988 SELF CARE GOAL STATUS: HCPCS | Mod: CH

## 2018-05-07 PROCEDURE — 74018 RADEX ABDOMEN 1 VIEW: CPT

## 2018-05-07 PROCEDURE — 80048 BASIC METABOLIC PNL TOTAL CA: CPT

## 2018-05-07 PROCEDURE — 700111 HCHG RX REV CODE 636 W/ 250 OVERRIDE (IP): Performed by: HOSPITALIST

## 2018-05-07 PROCEDURE — 95951 EEG: CPT | Mod: 52

## 2018-05-07 PROCEDURE — 93306 TTE W/DOPPLER COMPLETE: CPT

## 2018-05-07 PROCEDURE — 700102 HCHG RX REV CODE 250 W/ 637 OVERRIDE(OP): Performed by: HOSPITALIST

## 2018-05-07 PROCEDURE — 700102 HCHG RX REV CODE 250 W/ 637 OVERRIDE(OP): Performed by: NURSE PRACTITIONER

## 2018-05-07 PROCEDURE — 84484 ASSAY OF TROPONIN QUANT: CPT

## 2018-05-07 PROCEDURE — 302135 SEQUENTIAL COMPRESSION MACHINE: Performed by: HOSPITALIST

## 2018-05-07 PROCEDURE — 770006 HCHG ROOM/CARE - MED/SURG/GYN SEMI*

## 2018-05-07 PROCEDURE — 700105 HCHG RX REV CODE 258: Performed by: HOSPITALIST

## 2018-05-07 PROCEDURE — 97165 OT EVAL LOW COMPLEX 30 MIN: CPT

## 2018-05-07 PROCEDURE — 36415 COLL VENOUS BLD VENIPUNCTURE: CPT

## 2018-05-07 PROCEDURE — 71045 X-RAY EXAM CHEST 1 VIEW: CPT

## 2018-05-07 PROCEDURE — 70250 X-RAY EXAM OF SKULL: CPT

## 2018-05-07 PROCEDURE — 83735 ASSAY OF MAGNESIUM: CPT

## 2018-05-07 PROCEDURE — 99232 SBSQ HOSP IP/OBS MODERATE 35: CPT | Performed by: HOSPITALIST

## 2018-05-07 PROCEDURE — 93005 ELECTROCARDIOGRAM TRACING: CPT | Performed by: HOSPITALIST

## 2018-05-07 PROCEDURE — A9270 NON-COVERED ITEM OR SERVICE: HCPCS | Performed by: NURSE PRACTITIONER

## 2018-05-07 PROCEDURE — G8987 SELF CARE CURRENT STATUS: HCPCS | Mod: CI

## 2018-05-07 PROCEDURE — 85025 COMPLETE CBC W/AUTO DIFF WBC: CPT

## 2018-05-07 PROCEDURE — 83036 HEMOGLOBIN GLYCOSYLATED A1C: CPT

## 2018-05-07 PROCEDURE — 70551 MRI BRAIN STEM W/O DYE: CPT

## 2018-05-07 PROCEDURE — 80061 LIPID PANEL: CPT

## 2018-05-07 PROCEDURE — A9270 NON-COVERED ITEM OR SERVICE: HCPCS | Performed by: HOSPITALIST

## 2018-05-07 PROCEDURE — 93306 TTE W/DOPPLER COMPLETE: CPT | Mod: 26 | Performed by: INTERNAL MEDICINE

## 2018-05-07 PROCEDURE — 84100 ASSAY OF PHOSPHORUS: CPT

## 2018-05-07 RX ORDER — ATORVASTATIN CALCIUM 40 MG/1
40 TABLET, FILM COATED ORAL EVERY EVENING
Status: DISCONTINUED | OUTPATIENT
Start: 2018-05-07 | End: 2018-05-14 | Stop reason: HOSPADM

## 2018-05-07 RX ORDER — POTASSIUM CHLORIDE 20 MEQ/1
40 TABLET, EXTENDED RELEASE ORAL ONCE
Status: COMPLETED | OUTPATIENT
Start: 2018-05-07 | End: 2018-05-07

## 2018-05-07 RX ADMIN — LEVETIRACETAM 500 MG: 100 SOLUTION ORAL at 07:56

## 2018-05-07 RX ADMIN — Medication 100 MG: at 07:54

## 2018-05-07 RX ADMIN — ATORVASTATIN CALCIUM 40 MG: 40 TABLET, FILM COATED ORAL at 20:10

## 2018-05-07 RX ADMIN — PHENYTOIN 100 MG: 125 SUSPENSION ORAL at 07:55

## 2018-05-07 RX ADMIN — ASPIRIN 325 MG: 325 TABLET ORAL at 07:55

## 2018-05-07 RX ADMIN — STANDARDIZED SENNA CONCENTRATE AND DOCUSATE SODIUM 2 TABLET: 8.6; 5 TABLET, FILM COATED ORAL at 20:10

## 2018-05-07 RX ADMIN — LEVETIRACETAM 500 MG: 100 SOLUTION ORAL at 01:08

## 2018-05-07 RX ADMIN — PHENYTOIN 100 MG: 125 SUSPENSION ORAL at 01:08

## 2018-05-07 RX ADMIN — SODIUM CHLORIDE: 9 INJECTION, SOLUTION INTRAVENOUS at 10:55

## 2018-05-07 RX ADMIN — METOPROLOL TARTRATE 25 MG: 25 TABLET, FILM COATED ORAL at 01:08

## 2018-05-07 RX ADMIN — FOLIC ACID 1 MG: 1 TABLET ORAL at 07:55

## 2018-05-07 RX ADMIN — ENOXAPARIN SODIUM 40 MG: 100 INJECTION SUBCUTANEOUS at 07:56

## 2018-05-07 RX ADMIN — PHENYTOIN 100 MG: 125 SUSPENSION ORAL at 23:51

## 2018-05-07 RX ADMIN — THERA TABS 1 TABLET: TAB at 07:55

## 2018-05-07 RX ADMIN — POTASSIUM CHLORIDE 40 MEQ: 1500 TABLET, EXTENDED RELEASE ORAL at 10:55

## 2018-05-07 RX ADMIN — POTASSIUM CHLORIDE 10 MEQ: 7.46 INJECTION, SOLUTION INTRAVENOUS at 01:40

## 2018-05-07 RX ADMIN — ATORVASTATIN CALCIUM 80 MG: 80 TABLET, FILM COATED ORAL at 01:07

## 2018-05-07 RX ADMIN — METOPROLOL TARTRATE 25 MG: 25 TABLET, FILM COATED ORAL at 20:10

## 2018-05-07 RX ADMIN — STANDARDIZED SENNA CONCENTRATE AND DOCUSATE SODIUM 2 TABLET: 8.6; 5 TABLET, FILM COATED ORAL at 01:07

## 2018-05-07 RX ADMIN — METOPROLOL TARTRATE 25 MG: 25 TABLET, FILM COATED ORAL at 07:54

## 2018-05-07 RX ADMIN — SODIUM CHLORIDE: 9 INJECTION, SOLUTION INTRAVENOUS at 01:09

## 2018-05-07 RX ADMIN — LEVETIRACETAM 500 MG: 100 SOLUTION ORAL at 20:10

## 2018-05-07 RX ADMIN — PHENYTOIN 100 MG: 125 SUSPENSION ORAL at 18:15

## 2018-05-07 RX ADMIN — STANDARDIZED SENNA CONCENTRATE AND DOCUSATE SODIUM 2 TABLET: 8.6; 5 TABLET, FILM COATED ORAL at 07:55

## 2018-05-07 ASSESSMENT — LIFESTYLE VARIABLES
NAUSEA AND VOMITING: NO NAUSEA AND NO VOMITING
HEADACHE, FULLNESS IN HEAD: NOT PRESENT
AGITATION: NORMAL ACTIVITY
TREMOR: NO TREMOR
PAROXYSMAL SWEATS: NO SWEAT VISIBLE
NAUSEA AND VOMITING: NO NAUSEA AND NO VOMITING
TREMOR: NO TREMOR
VISUAL DISTURBANCES: NOT PRESENT
ORIENTATION AND CLOUDING OF SENSORIUM: DISORIENTED FOR PLACE AND / OR PERSON
NAUSEA AND VOMITING: NO NAUSEA AND NO VOMITING
AGITATION: NORMAL ACTIVITY
VISUAL DISTURBANCES: NOT PRESENT
ANXIETY: NO ANXIETY (AT EASE)
ANXIETY: NO ANXIETY (AT EASE)
TOTAL SCORE: 0
EVER HAD A DRINK FIRST THING IN THE MORNING TO STEADY YOUR NERVES TO GET RID OF A HANGOVER: NO
AGITATION: NORMAL ACTIVITY
ALCOHOL_USE: YES
ORIENTATION AND CLOUDING OF SENSORIUM: DISORIENTED FOR PLACE AND / OR PERSON
PAROXYSMAL SWEATS: NO SWEAT VISIBLE
VISUAL DISTURBANCES: NOT PRESENT
AUDITORY DISTURBANCES: NOT PRESENT
NAUSEA AND VOMITING: NO NAUSEA AND NO VOMITING
HOW MANY TIMES IN THE PAST YEAR HAVE YOU HAD 5 OR MORE DRINKS IN A DAY: 12
TREMOR: NO TREMOR
HEADACHE, FULLNESS IN HEAD: NOT PRESENT
EVER_SMOKED: YES
VISUAL DISTURBANCES: NOT PRESENT
ALCOHOL_USE: YES
TOTAL SCORE: 0
ANXIETY: NO ANXIETY (AT EASE)
ORIENTATION AND CLOUDING OF SENSORIUM: DISORIENTED FOR PLACE AND / OR PERSON
AVERAGE NUMBER OF DAYS PER WEEK YOU HAVE A DRINK CONTAINING ALCOHOL: 6
AGITATION: NORMAL ACTIVITY
TOTAL SCORE: 4
AUDITORY DISTURBANCES: NOT PRESENT
HEADACHE, FULLNESS IN HEAD: NOT PRESENT
AUDITORY DISTURBANCES: NOT PRESENT
ON A TYPICAL DAY WHEN YOU DRINK ALCOHOL HOW MANY DRINKS DO YOU HAVE: 2
ORIENTATION AND CLOUDING OF SENSORIUM: DISORIENTED FOR PLACE AND / OR PERSON
AUDITORY DISTURBANCES: NOT PRESENT
PAROXYSMAL SWEATS: NO SWEAT VISIBLE
HEADACHE, FULLNESS IN HEAD: NOT PRESENT
EVER FELT BAD OR GUILTY ABOUT YOUR DRINKING: NO
TOTAL SCORE: 4
TOTAL SCORE: 4
HAVE PEOPLE ANNOYED YOU BY CRITICIZING YOUR DRINKING: NO
PAROXYSMAL SWEATS: NO SWEAT VISIBLE
TOTAL SCORE: 0
ORIENTATION AND CLOUDING OF SENSORIUM: DISORIENTED FOR PLACE AND / OR PERSON
AUDITORY DISTURBANCES: NOT PRESENT
CONSUMPTION TOTAL: POSITIVE
TOTAL SCORE: 4
ANXIETY: NO ANXIETY (AT EASE)
VISUAL DISTURBANCES: NOT PRESENT
NAUSEA AND VOMITING: NO NAUSEA AND NO VOMITING
AGITATION: NORMAL ACTIVITY
TREMOR: NO TREMOR
TOTAL SCORE: 4
PAROXYSMAL SWEATS: NO SWEAT VISIBLE
HAVE YOU EVER FELT YOU SHOULD CUT DOWN ON YOUR DRINKING: NO
HEADACHE, FULLNESS IN HEAD: NOT PRESENT
ANXIETY: NO ANXIETY (AT EASE)
TREMOR: NO TREMOR

## 2018-05-07 ASSESSMENT — ENCOUNTER SYMPTOMS
PSYCHIATRIC NEGATIVE: 1
FOCAL WEAKNESS: 1
MUSCULOSKELETAL NEGATIVE: 1
CARDIOVASCULAR NEGATIVE: 1
SEIZURES: 1
EYES NEGATIVE: 1
CONSTITUTIONAL NEGATIVE: 1
GASTROINTESTINAL NEGATIVE: 1
RESPIRATORY NEGATIVE: 1

## 2018-05-07 ASSESSMENT — COGNITIVE AND FUNCTIONAL STATUS - GENERAL
DRESSING REGULAR UPPER BODY CLOTHING: A LOT
STANDING UP FROM CHAIR USING ARMS: A LOT
EATING MEALS: A LITTLE
SUGGESTED CMS G CODE MODIFIER DAILY ACTIVITY: CL
MOVING FROM LYING ON BACK TO SITTING ON SIDE OF FLAT BED: A LOT
PERSONAL GROOMING: A LITTLE
HELP NEEDED FOR BATHING: A LITTLE
HELP NEEDED FOR BATHING: A LOT
TOILETING: A LOT
DAILY ACTIVITIY SCORE: 13
PERSONAL GROOMING: A LOT
TOILETING: A LITTLE
DRESSING REGULAR UPPER BODY CLOTHING: A LITTLE
SUGGESTED CMS G CODE MODIFIER DAILY ACTIVITY: CK
DRESSING REGULAR LOWER BODY CLOTHING: A LITTLE
SUGGESTED CMS G CODE MODIFIER MOBILITY: CK
WALKING IN HOSPITAL ROOM: A LOT
MOBILITY SCORE: 16
DAILY ACTIVITIY SCORE: 19
DRESSING REGULAR LOWER BODY CLOTHING: A LOT
CLIMB 3 TO 5 STEPS WITH RAILING: A LOT

## 2018-05-07 ASSESSMENT — PATIENT HEALTH QUESTIONNAIRE - PHQ9
SUM OF ALL RESPONSES TO PHQ9 QUESTIONS 1 AND 2: 0
2. FEELING DOWN, DEPRESSED, IRRITABLE, OR HOPELESS: NOT AT ALL
1. LITTLE INTEREST OR PLEASURE IN DOING THINGS: NOT AT ALL

## 2018-05-07 ASSESSMENT — PAIN SCALES - GENERAL: PAINLEVEL_OUTOF10: 0

## 2018-05-07 ASSESSMENT — ACTIVITIES OF DAILY LIVING (ADL): TOILETING: UNABLE TO DETERMINE AT THIS TIME

## 2018-05-07 NOTE — ASSESSMENT & PLAN NOTE
Patient reports noncompliance with seizure medications, and possible seizure as cause for altered state on day of admission.   Continue home dose of dilantin and keppra.  No seizures witnessed here  Heavy etoh use at home likely contributed

## 2018-05-07 NOTE — THERAPY
PT orders received and acknowledged. Attempted PT eval this pm. Pt being set up for EEG. Will complete PT evaluation as able.

## 2018-05-07 NOTE — H&P
Hospital Medicine History and Physical    Date of Service  5/6/2018    Chief Complaint  Seizure medication being sought.  Right facial droop per caregiver    History of Presenting Illness  63 y.o. male with history of hypertension controlled on current metoprolol regimen, alcoholism ongoing, and prior CVA with baseline ambulatory dysfunction, and right sided weakness was in his usual state of health until approximately 1 month prior to admission, when apparently his caregiver noticed right sided facial droop.  She felt this was due to chronic alcohol use, and he had no evaluation at the time.  2 days prior to admission, he additionally had word finding difficullties, and slurring of his speech, as well as being altered all far from baseline.  When this did not improve, he was brought to an outside hospital, where concern for stroke required higher level of care at this facility.   Patient is able to respond to yes or no questions relatively easily, but seems to not grasp complex commands, such as asking for him to stick his tongue out.  He provides not further history, and in fact states only that he is here for seizure medications, which he reports taking 3 or 2 times daily.    Primary Care Physician  Curtis Ramirez M.D.    Consultants  None     Code Status  Full code     Review of Systems  Review of Systems   Constitutional: Negative.    HENT: Negative.    Eyes: Negative.    Respiratory: Negative.    Cardiovascular: Negative.    Gastrointestinal: Negative.    Genitourinary: Negative.    Musculoskeletal: Negative.    Skin: Negative.    Neurological: Positive for focal weakness and seizures.   Endo/Heme/Allergies: Negative.    Psychiatric/Behavioral: Negative.         Past Medical History  Past Medical History:   Diagnosis Date   • Aspiration pneumonia (CMS-HCC) 9/14/2017   • Alcoholism /alcohol abuse (CMS-HCC)    • Backpain    • Fall    • Head trauma    • Hypertension    • Seizure disorder (CMS-HCC)         Surgical History  Past Surgical History:   Procedure Laterality Date   • SOCORRO HOLES         Medications  No current facility-administered medications on file prior to encounter.      Current Outpatient Prescriptions on File Prior to Encounter   Medication Sig Dispense Refill   • folic acid (FOLVITE) 1 MG Tab Take 1 Tab by mouth every day. 30 Tab 3   • levetiracetam (KEPPRA) 100 MG/ML Solution Take 5 mL by mouth every 12 hours. 240 mL 3   • phenytoin (DILANTIN) 100 MG/4ML Suspension Take 4 mL by mouth every 8 hours. 1 Bottle 3   • thiamine (THIAMINE) 100 MG tablet Take 1 Tab by mouth every day. 30 Tab 3   • docusate sodium (COLACE) 100 MG CAPS Take 100 mg by mouth 2 times a day. TAKE ONE CAP WITH BREAKFAST AND DINNER. STOOL SOFTENER-HOLD FOR LOOSE STOOLS.      • famotidine (PEPCID) 20 MG TABS Take  by mouth. TAKE ONE TAB TWO TIMES DAILY BEFORE MEALS FOR INDIGESTION      • metoprolol (LOPRESSOR) 25 MG TABS Take  by mouth. TAKE ONE TAB EVERY MORNING TO CONTROL BLOOD PRESSURE      • Multiple Vitamins-Minerals (THERAGRAN-M PO) Take  by mouth. MULTIPLE VITAMIN-TAKE ONE EVERY DAY WITH LUNCH          Family History  Family History   Problem Relation Age of Onset   • Stroke Mother    • Stroke Father        Social History  Social History   Substance Use Topics   • Smoking status: Current Every Day Smoker   • Smokeless tobacco: Never Used      Comment: 30 years off and on unknown amt. per family   • Alcohol use Yes       Allergies  No Known Allergies     Physical Exam  Laboratory   Hemodynamics  No data recorded.      No Data Recorded           Respiratory                    Physical Exam   Constitutional: He appears well-developed and well-nourished. No distress.   HENT:   Head: Normocephalic and atraumatic.   Eyes: Conjunctivae are normal. Pupils are equal, round, and reactive to light.   Neck: Normal range of motion. Neck supple. No tracheal deviation present. No thyromegaly present.   Cardiovascular: Normal rate and  "regular rhythm.  Exam reveals no gallop and no friction rub.    Murmur heard.  Pulmonary/Chest: Effort normal and breath sounds normal. No respiratory distress. He has no wheezes.   Abdominal: Soft. Bowel sounds are normal. He exhibits no distension and no mass. There is no tenderness. There is no rebound and no guarding.   Musculoskeletal: Normal range of motion. He exhibits no edema.   Lymphadenopathy:     He has no cervical adenopathy.   Neurological: He is alert. No cranial nerve deficit.   Mild right sided facial droop, 4.5/5 strength to right upper and lower extremity, normal left upper and lower extremity strength.  Cognitively impaired with word finding difficulty, unable to understand some commands, such as \"stick out your tongue\" although there does not seem to be a language barrier   Skin: Skin is warm and dry. He is not diaphoretic.   Psychiatric: He has a normal mood and affect.   Nursing note and vitals reviewed.      ROCKY 0.196%  Na 121  K 3.1  Cl 85  Co2 27.9  Ca 8.1  Glu 93  BUN 4  Crea 0.58  TPro 6.7  Alb 3.3  Alk Phos 221    TSH 0.55    WBC 3.42  H/H 11.8/34.1 MCV 93.3  eos 6.2%    UA:  Negative for glu, bili, ketone, protein, nitrite, leukocyte esterase    UDS: Negative save barbiturates which are positive    INR 0.95 PT 10.0        Urinalysis:        Imaging  CT cervical spine without contrast from outside facility:  Moderate multilevel cervical spondylosis with no acute fracture noted    CT head without contrast from outside facility:  Left parietal scalp hematoma, no acute intracranial abnormality, crhonic encephalomalacia left frontal and left temporal lobes from remote left MCA infarct, mild cerebral atrophy with chronic microvascular ischemic changes, left mastoid air cell effusion   Assessment/Plan     I anticipate this patient will require at least two midnights for appropriate medical management, necessitating inpatient admission.    * CVA (cerebral vascular accident) (HCC) "   Assessment & Plan    With new right sided facial droop, word finding difficulty and inability of comprehension.  Unclear acute or subacute.  Check MRI, stroke orderset except permissive hypertension as patient symptom onset is greater than 48h prior to presentation        Seizure disorder (HCC)   Assessment & Plan    With questionable compliance with seizure medications, and possible seizure as cause for altered state on day of admission.  Restart medications and monitor.  Again, MRI is pending.  Will order EEG, seizure precautions        History of CVA (cerebrovascular accident)   Assessment & Plan    With right sided weakness, unclear chronicity given poor historian and caregiver unavailable at this time.  Patient does report needing walker at baseline for ambulation.          Alcohol withdrawal (HCC)- (present on admission)   Assessment & Plan    Concern for the same, with prominent history of the same, and current intoxication with ROCKY .197%  CIWA protocol        Hypokalemia- (present on admission)   Assessment & Plan    Replace        Hyponatremia- (present on admission)   Assessment & Plan    Unclear etiology, but with evidence of dehydration favor hypovolemia as cause.  Replete, monitor.             VTE prophylaxis: SCD, lovenox.

## 2018-05-07 NOTE — THERAPY
"Occupational Therapy Evaluation completed.   Functional Status:  CGA full body dressing, CGA grooming standing at sink - pt brushed teeth and washed face, CGA functional mobility w/ no AD within room   Plan of Care: Will benefit from Occupational Therapy 3 times per week  Discharge Recommendations:  Equipment: Will Continue to Assess for Equipment Needs. Post-acute therapy: Unclear what pt's PLOF, supports, or home environment is. Will continue to assess for DC needs.     See \"Rehab Therapy-Acute\" Patient Summary Report for complete documentation.    Pt is a 64 y/o male who presents to Acute 2' concern for a stroke as medical chart reports that pt had r sided facial droop, word finding difficulties and slurred speech. Pt has ongoing alcoholism. RN aware of OT evaluation and approved. Pt was able to follow 1 step instructions on this date. Oriented x0. Very pleasant and cooperative to work with. Pt. Presents w/ decreased balance, functional mobility, activity tolerance and poor insight to deficits impacting his ability to participate in BADLs safely indicating a need for Acute OT services. Unclear what PLOF, Home Support, or Home environment is at this time, will continue to assess for DC needs.     "

## 2018-05-07 NOTE — PROGRESS NOTES
Direct admit from Dr. Najera at [facility]. Dr. Alford accepting for Altered Mental Status. ADT signed and held 05/06/2018 at 2038 and will need to be released upon patient arrival to unit. Patient arriving via ground transport.

## 2018-05-07 NOTE — PROGRESS NOTES
Discussed MRI with Dr. Andre. Will need imaging for the radiologist to review to confirm MRI is safe. Orders received waiting for scans to be completed.

## 2018-05-07 NOTE — PROGRESS NOTES
Assumed care of pt at 0700.   Pt is confused and oriented to self only this AM.   Pt denies n/v and pain at this time.   Pt reports numbness to R leg.   Pt is x1 assist with FWW to bathroom.   Tele monitor in place.   Pt is on 2L O2 via NC.   Plan of care discussed.   Hourly rounding in place.

## 2018-05-07 NOTE — PROGRESS NOTES
Two RN skin check completed. Scabbed abrasion noted to pts right knee, otherwise his skin is clean, dry and intact.

## 2018-05-07 NOTE — ASSESSMENT & PLAN NOTE
Right facial droop  Baseline decreased cognition  MRI shows multiple old infarcts, no acute findings  No significant abnormalities on echo or carotids  Continue ASA and lipitor.  SNF pending

## 2018-05-07 NOTE — PROGRESS NOTES
Direct admit accept from [facility], arrived to floor by stretcher. Stand pivot to bed with assistance. VSS. MD notified, at bedside to see pt at this time. Pt alert to self only, poor historian. Doesn't seem to know events leading to hospitalization. No distress or pain noted. Passed dysphagia screen. Incontinent of bladder and bowel, pericare given. Will continue to monitor.

## 2018-05-07 NOTE — CARE PLAN
Problem: Safety  Goal: Will remain free from falls    Intervention: Implement fall precautions  Bed alarm is in place, treaded slipper socks are on, call light in place, pt educated to call for assistance.       Problem: Venous Thromboembolism (VTW)/Deep Vein Thrombosis (DVT) Prevention:  Goal: Patient will participate in Venous Thrombosis (VTE)/Deep Vein Thrombosis (DVT)Prevention Measures  Outcome: PROGRESSING AS EXPECTED  Will place SCD    Problem: Mobility  Goal: Risk for activity intolerance will decrease  Outcome: PROGRESSING AS EXPECTED  Pt is up with one assist.

## 2018-05-07 NOTE — PROGRESS NOTES
[facility] Hospitalist Progress Note    Date of Service: 2018    Chief Complaint  63 y.o. Male with h/o ETOH abuse, CVA, HTN, and seizure disorder admitted 2018 with right facial droop and confusion.    Interval Problem Update  MRI pending. Continues to have right facial droop.  Slightly weaker on right side, but this is a chronic issue.  Oriented x 2.  Denies pain or shortness of breath.  VSS.    Consultants/Specialty  N/A    Disposition  TBD.        Review of Systems   Unable to perform ROS: Mental acuity      Physical Exam  Laboratory/Imaging   Hemodynamics  Temp (24hrs), Av.5 °C (97.7 °F), Min:36.3 °C (97.4 °F), Max:36.7 °C (98 °F)   Temperature: 36.3 °C (97.4 °F)  Pulse  Av.3  Min: 69  Max: 93    Blood Pressure: 117/79      Respiratory      Respiration: 16, Pulse Oximetry: 99 %        RUL Breath Sounds: Clear, RML Breath Sounds: Clear, RLL Breath Sounds: Clear, MOHSEN Breath Sounds: Clear, LLL Breath Sounds: Clear    Fluids    Intake/Output Summary (Last 24 hours) at 18 1220  Last data filed at 18 0400   Gross per 24 hour   Intake              360 ml   Output             1600 ml   Net            -1240 ml       Nutrition  Orders Placed This Encounter   Procedures   • DIET ORDER     Standing Status:   Standing     Number of Occurrences:   1     Order Specific Question:   Diet:     Answer:   2 Gram Sodium [7]     Order Specific Question:   Macronutrient modifications:     Answer:   Low Cholesterol [7]     Physical Exam   Constitutional: He appears well-developed. No distress.   HENT:   Head: Normocephalic and atraumatic.   Mouth/Throat: No oropharyngeal exudate.   Eyes: Conjunctivae are normal. Right eye exhibits no discharge. Left eye exhibits no discharge.   Neck: Neck supple. No tracheal deviation present.   Cardiovascular: Normal rate, regular rhythm, normal heart sounds and intact distal pulses.    No murmur heard.  Pulmonary/Chest: Effort normal and breath sounds normal. No stridor. No  respiratory distress. He has no wheezes.   Abdominal: Soft. Bowel sounds are normal. He exhibits no distension. There is no tenderness. There is no rebound.   Musculoskeletal: He exhibits no edema or deformity.   Neurological: He is alert. A cranial nerve deficit is present.   Oriented x 2.    Right facial droop.   Skin: Skin is warm and dry. No rash noted. He is not diaphoretic. No erythema.   Nursing note and vitals reviewed.      Recent Labs      05/07/18 0418   WBC  4.9   RBC  3.81*   HEMOGLOBIN  12.5*   HEMATOCRIT  34.6*   MCV  90.8   MCH  32.8   MCHC  36.1*   RDW  47.9   PLATELETCT  245   MPV  7.1*     Recent Labs      05/07/18 0418   SODIUM  131*   POTASSIUM  3.5*   CHLORIDE  93*   CO2  30   GLUCOSE  90   BUN  3*   CREATININE  0.55   CALCIUM  8.9             Recent Labs      05/07/18 0418   TRIGLYCERIDE  79   HDL  98   LDL  126*          Assessment/Plan     * CVA (cerebral vascular accident) (HCC)   Assessment & Plan    Right facial droop with confusion  MRI, carotid US, and echo pending.  Continue ASA and lipitor.  PT/OT/SLP following.        Seizure disorder (HCC)   Assessment & Plan    Patient reports noncompliance with seizure medications, and possible seizure as cause for altered state on day of admission.   Continue home dose of dilantin and keppra.  MRI and EEG pending.  No witnessed seizure activity since admission.        History of CVA (cerebrovascular accident)   Assessment & Plan    With right sided weakness, unclear chronicity given poor historian and caregiver unavailable at this time.  Patient does report needing walker at baseline for ambulation.    Continue ASA and statin.        Alcohol withdrawal (HCC)- (present on admission)   Assessment & Plan    Concern for the same, with prominent history of the same, and current intoxication with ROCKY .197%  CIWA protocol  Continue thiamine, folic acid, and multivitamin.         Hypokalemia- (present on admission)   Assessment & Plan     Replace  Follow bmp.        Hyponatremia- (present on admission)   Assessment & Plan    Unclear etiology, but with evidence of dehydration favor hypovolemia as cause.  Replete, monitor.   Asymptomatic.          Quality-Core Measures   Reviewed items::  Radiology images reviewed, Labs reviewed, Medications reviewed and EKG reviewed  Benson catheter::  No Benson  DVT prophylaxis pharmacological::  Enoxaparin (Lovenox)  Ulcer Prophylaxis::  Not indicated  Assessed for rehabilitation services:  Patient was assess for and/or received rehabilitation services during this hospitalization

## 2018-05-07 NOTE — CARE PLAN
Problem: Communication  Goal: The ability to communicate needs accurately and effectively will improve  Outcome: PROGRESSING AS EXPECTED  Pt able to make basic needs known, using call light appropriately     Problem: Safety  Goal: Will remain free from falls  Outcome: PROGRESSING AS EXPECTED  Bed alarm in place, pt calling throughout the night for assistance.

## 2018-05-07 NOTE — PROGRESS NOTES
Pt has a brain MRI ordered; pt NOT A&O and able to answer the safety questions.  Xrays ordered to clear pt; pt ok to have an MRI at this time per radiologist, Dr. Ramirez.

## 2018-05-08 LAB
ANION GAP SERPL CALC-SCNC: 5 MMOL/L (ref 0–11.9)
BUN SERPL-MCNC: 11 MG/DL (ref 8–22)
CALCIUM SERPL-MCNC: 8.6 MG/DL (ref 8.5–10.5)
CHLORIDE SERPL-SCNC: 101 MMOL/L (ref 96–112)
CO2 SERPL-SCNC: 28 MMOL/L (ref 20–33)
CREAT SERPL-MCNC: 0.6 MG/DL (ref 0.5–1.4)
EKG IMPRESSION: NORMAL
ERYTHROCYTE [DISTWIDTH] IN BLOOD BY AUTOMATED COUNT: 53.6 FL (ref 35.9–50)
GLUCOSE SERPL-MCNC: 91 MG/DL (ref 65–99)
HCT VFR BLD AUTO: 34.8 % (ref 42–52)
HGB BLD-MCNC: 11.9 G/DL (ref 14–18)
MAGNESIUM SERPL-MCNC: 2.2 MG/DL (ref 1.5–2.5)
MCH RBC QN AUTO: 32.5 PG (ref 27–33)
MCHC RBC AUTO-ENTMCNC: 34.2 G/DL (ref 33.7–35.3)
MCV RBC AUTO: 95.1 FL (ref 81.4–97.8)
PHOSPHATE SERPL-MCNC: 3.1 MG/DL (ref 2.5–4.5)
PLATELET # BLD AUTO: 235 K/UL (ref 164–446)
PMV BLD AUTO: 7.3 FL (ref 9–12.9)
POTASSIUM SERPL-SCNC: 4 MMOL/L (ref 3.6–5.5)
RBC # BLD AUTO: 3.66 M/UL (ref 4.7–6.1)
SODIUM SERPL-SCNC: 134 MMOL/L (ref 135–145)
WBC # BLD AUTO: 5.4 K/UL (ref 4.8–10.8)

## 2018-05-08 PROCEDURE — 700111 HCHG RX REV CODE 636 W/ 250 OVERRIDE (IP): Performed by: HOSPITALIST

## 2018-05-08 PROCEDURE — 93880 EXTRACRANIAL BILAT STUDY: CPT | Mod: 26 | Performed by: SURGERY

## 2018-05-08 PROCEDURE — A9270 NON-COVERED ITEM OR SERVICE: HCPCS | Performed by: HOSPITALIST

## 2018-05-08 PROCEDURE — 85027 COMPLETE CBC AUTOMATED: CPT

## 2018-05-08 PROCEDURE — 84100 ASSAY OF PHOSPHORUS: CPT

## 2018-05-08 PROCEDURE — 97161 PT EVAL LOW COMPLEX 20 MIN: CPT

## 2018-05-08 PROCEDURE — 93880 EXTRACRANIAL BILAT STUDY: CPT

## 2018-05-08 PROCEDURE — 700102 HCHG RX REV CODE 250 W/ 637 OVERRIDE(OP): Performed by: HOSPITALIST

## 2018-05-08 PROCEDURE — 93010 ELECTROCARDIOGRAM REPORT: CPT | Performed by: INTERNAL MEDICINE

## 2018-05-08 PROCEDURE — 770006 HCHG ROOM/CARE - MED/SURG/GYN SEMI*

## 2018-05-08 PROCEDURE — 36415 COLL VENOUS BLD VENIPUNCTURE: CPT

## 2018-05-08 PROCEDURE — 83735 ASSAY OF MAGNESIUM: CPT

## 2018-05-08 PROCEDURE — 770020 HCHG ROOM/CARE - TELE (206)

## 2018-05-08 PROCEDURE — 93005 ELECTROCARDIOGRAM TRACING: CPT | Performed by: HOSPITALIST

## 2018-05-08 PROCEDURE — 99232 SBSQ HOSP IP/OBS MODERATE 35: CPT | Performed by: HOSPITALIST

## 2018-05-08 PROCEDURE — A9270 NON-COVERED ITEM OR SERVICE: HCPCS | Performed by: NURSE PRACTITIONER

## 2018-05-08 PROCEDURE — 700102 HCHG RX REV CODE 250 W/ 637 OVERRIDE(OP): Performed by: NURSE PRACTITIONER

## 2018-05-08 PROCEDURE — G8979 MOBILITY GOAL STATUS: HCPCS | Mod: CI

## 2018-05-08 PROCEDURE — G8978 MOBILITY CURRENT STATUS: HCPCS | Mod: CI

## 2018-05-08 PROCEDURE — 80048 BASIC METABOLIC PNL TOTAL CA: CPT

## 2018-05-08 RX ADMIN — PHENYTOIN 100 MG: 125 SUSPENSION ORAL at 17:33

## 2018-05-08 RX ADMIN — METOPROLOL TARTRATE 25 MG: 25 TABLET, FILM COATED ORAL at 08:38

## 2018-05-08 RX ADMIN — ASPIRIN 325 MG: 325 TABLET ORAL at 08:37

## 2018-05-08 RX ADMIN — METOPROLOL TARTRATE 25 MG: 25 TABLET, FILM COATED ORAL at 21:21

## 2018-05-08 RX ADMIN — FOLIC ACID 1 MG: 1 TABLET ORAL at 08:39

## 2018-05-08 RX ADMIN — PHENYTOIN 100 MG: 125 SUSPENSION ORAL at 23:45

## 2018-05-08 RX ADMIN — PHENYTOIN 100 MG: 125 SUSPENSION ORAL at 08:37

## 2018-05-08 RX ADMIN — ENOXAPARIN SODIUM 40 MG: 100 INJECTION SUBCUTANEOUS at 08:39

## 2018-05-08 RX ADMIN — STANDARDIZED SENNA CONCENTRATE AND DOCUSATE SODIUM 2 TABLET: 8.6; 5 TABLET, FILM COATED ORAL at 21:21

## 2018-05-08 RX ADMIN — LEVETIRACETAM 500 MG: 100 SOLUTION ORAL at 21:21

## 2018-05-08 RX ADMIN — STANDARDIZED SENNA CONCENTRATE AND DOCUSATE SODIUM 2 TABLET: 8.6; 5 TABLET, FILM COATED ORAL at 08:38

## 2018-05-08 RX ADMIN — THERA TABS 1 TABLET: TAB at 08:38

## 2018-05-08 RX ADMIN — Medication 100 MG: at 08:38

## 2018-05-08 RX ADMIN — ATORVASTATIN CALCIUM 40 MG: 40 TABLET, FILM COATED ORAL at 21:21

## 2018-05-08 RX ADMIN — LEVETIRACETAM 500 MG: 100 SOLUTION ORAL at 08:37

## 2018-05-08 ASSESSMENT — COGNITIVE AND FUNCTIONAL STATUS - GENERAL
MOBILITY SCORE: 22
SUGGESTED CMS G CODE MODIFIER MOBILITY: CJ
CLIMB 3 TO 5 STEPS WITH RAILING: A LITTLE
WALKING IN HOSPITAL ROOM: A LITTLE

## 2018-05-08 ASSESSMENT — LIFESTYLE VARIABLES
HEADACHE, FULLNESS IN HEAD: NOT PRESENT
HEADACHE, FULLNESS IN HEAD: NOT PRESENT
TREMOR: NO TREMOR
NAUSEA AND VOMITING: NO NAUSEA AND NO VOMITING
ANXIETY: NO ANXIETY (AT EASE)
NAUSEA AND VOMITING: NO NAUSEA AND NO VOMITING
TOTAL SCORE: 4
TOTAL SCORE: 4
AGITATION: NORMAL ACTIVITY
TOTAL SCORE: 4
VISUAL DISTURBANCES: NOT PRESENT
ANXIETY: NO ANXIETY (AT EASE)
ORIENTATION AND CLOUDING OF SENSORIUM: DISORIENTED FOR PLACE AND / OR PERSON
ORIENTATION AND CLOUDING OF SENSORIUM: DISORIENTED FOR PLACE AND / OR PERSON
TREMOR: NO TREMOR
PAROXYSMAL SWEATS: NO SWEAT VISIBLE
TREMOR: NO TREMOR
AGITATION: NORMAL ACTIVITY
HEADACHE, FULLNESS IN HEAD: NOT PRESENT
PAROXYSMAL SWEATS: NO SWEAT VISIBLE
ORIENTATION AND CLOUDING OF SENSORIUM: DISORIENTED FOR PLACE AND / OR PERSON
AUDITORY DISTURBANCES: NOT PRESENT
AGITATION: NORMAL ACTIVITY
VISUAL DISTURBANCES: NOT PRESENT
NAUSEA AND VOMITING: NO NAUSEA AND NO VOMITING
AUDITORY DISTURBANCES: NOT PRESENT
VISUAL DISTURBANCES: NOT PRESENT
ANXIETY: NO ANXIETY (AT EASE)
AUDITORY DISTURBANCES: NOT PRESENT
PAROXYSMAL SWEATS: NO SWEAT VISIBLE

## 2018-05-08 ASSESSMENT — GAIT ASSESSMENTS
DISTANCE (FEET): 500
GAIT LEVEL OF ASSIST: STAND BY ASSIST
DEVIATION: DECREASED BASE OF SUPPORT

## 2018-05-08 ASSESSMENT — ENCOUNTER SYMPTOMS
GASTROINTESTINAL NEGATIVE: 1
EYES NEGATIVE: 1
RESPIRATORY NEGATIVE: 1
MUSCULOSKELETAL NEGATIVE: 1
CONSTITUTIONAL NEGATIVE: 1
FOCAL WEAKNESS: 1

## 2018-05-08 ASSESSMENT — PAIN SCALES - GENERAL: PAINLEVEL_OUTOF10: 0

## 2018-05-08 NOTE — CARE PLAN
Problem: Safety  Goal: Will remain free from injury    Intervention: Provide assistance with mobility  Pt calls appropriately for assistance to the BR, bed alarms in place.      Problem: Fluid Volume:  Goal: Will maintain balanced intake and output  Outcome: PROGRESSING AS EXPECTED  IVF infusing as ordered, pt encouraged to stay hydrated.

## 2018-05-08 NOTE — THERAPY
"Physical Therapy Evaluation completed.   Bed Mobility:  Supine to Sit:  (found and returned to EOB)  Transfers: Sit to Stand: Stand by Assist  Gait: Level Of Assist: Stand by Assist x500' with No Equipment Needed       Plan of Care: Will benefit from Physical Therapy 2 times per week  Discharge Recommendations: Equipment: No Equipment Needed.     Pt presents visually unsteady during gait but did not demo an overt LOB. He tends to have an inconsistent base of support as well as inconsistent step length. Unclear if pt is an accurate historian as he easily deviates from topics at hand. This could possibly be pt's functional baseline or new functional baseline. Per chart in ER MD note, pt has caregiver. If this is the case, pt can likely return to his prior living situation. He is currently a fall risk but anticipate he will self-progress as he mobilizes more with staff.     See \"Rehab Therapy-Acute\" Patient Summary Report for complete documentation.     "

## 2018-05-08 NOTE — CARE PLAN
Problem: Venous Thromboembolism (VTW)/Deep Vein Thrombosis (DVT) Prevention:  Goal: Patient will participate in Venous Thrombosis (VTE)/Deep Vein Thrombosis (DVT)Prevention Measures  Outcome: PROGRESSING AS EXPECTED  SCD in place, Lovenox in use.     Problem: Mobility  Goal: Risk for activity intolerance will decrease  Pt is up one assist with a steady gait.

## 2018-05-08 NOTE — PROGRESS NOTES
[facility]    Date of Service: 2018    Chief Complaint  63 y.o. male admitted 2018 with acute on chronic right sided weakness and word finding difficulties    Interval Problem Update  No seizure activity  Working well with PT    Consultants/Specialty      Disposition  snf        Review of Systems   Constitutional: Negative.    HENT: Negative.    Eyes: Negative.    Respiratory: Negative.    Gastrointestinal: Negative.    Genitourinary: Negative.    Musculoskeletal: Negative.    Skin: Negative.    Neurological: Positive for focal weakness.   Endo/Heme/Allergies: Negative.       Physical Exam  Laboratory/Imaging   Hemodynamics  Temp (24hrs), Av.6 °C (97.8 °F), Min:36.1 °C (97 °F), Max:36.8 °C (98.3 °F)   Temperature: 36.1 °C (97 °F)  Pulse  Av.4  Min: 64  Max: 93    Blood Pressure: 128/83      Respiratory      Respiration: 16, Pulse Oximetry: 94 %        RUL Breath Sounds: Clear, RML Breath Sounds: Clear, RLL Breath Sounds: Clear, MOHSEN Breath Sounds: Clear, LLL Breath Sounds: Clear    Fluids    Intake/Output Summary (Last 24 hours) at 18 1610  Last data filed at 18 0838   Gross per 24 hour   Intake              270 ml   Output                0 ml   Net              270 ml       Nutrition  Orders Placed This Encounter   Procedures   • DIET ORDER     Standing Status:   Standing     Number of Occurrences:   1     Order Specific Question:   Diet:     Answer:   2 Gram Sodium [7]     Order Specific Question:   Macronutrient modifications:     Answer:   Low Cholesterol [7]     Physical Exam   Constitutional: He is oriented to person, place, and time. He appears well-developed and well-nourished. No distress.   HENT:   Head: Atraumatic.   Nose: Nose normal.   Mouth/Throat: No oropharyngeal exudate.   Facial droop, stable   Eyes: Conjunctivae and EOM are normal. Pupils are equal, round, and reactive to light. Right eye exhibits no discharge. Left eye exhibits no discharge. No  scleral icterus.   Neck: Normal range of motion. Neck supple. No JVD present. No tracheal deviation present. No thyromegaly present.   Cardiovascular: Normal rate, regular rhythm, normal heart sounds and intact distal pulses.  Exam reveals no gallop and no friction rub.    No murmur heard.  Pulmonary/Chest: Effort normal and breath sounds normal. No stridor. No respiratory distress. He has no wheezes. He has no rales. He exhibits no tenderness.   Abdominal: Soft. Bowel sounds are normal. He exhibits no distension and no mass. There is no tenderness. There is no rebound and no guarding.   Musculoskeletal: Normal range of motion. He exhibits no edema, tenderness or deformity.   Lymphadenopathy:     He has no cervical adenopathy.   Neurological: He is alert and oriented to person, place, and time. He has normal reflexes. No cranial nerve deficit. He exhibits normal muscle tone. Coordination normal.   Skin: Skin is warm and dry. No rash noted. He is not diaphoretic. No erythema. No pallor.   Psychiatric: He has a normal mood and affect. His behavior is normal. Judgment and thought content normal.   Nursing note and vitals reviewed.      Recent Labs      05/07/18 0418 05/08/18   0345   WBC  4.9  5.4   RBC  3.81*  3.66*   HEMOGLOBIN  12.5*  11.9*   HEMATOCRIT  34.6*  34.8*   MCV  90.8  95.1   MCH  32.8  32.5   MCHC  36.1*  34.2   RDW  47.9  53.6*   PLATELETCT  245  235   MPV  7.1*  7.3*     Recent Labs      05/07/18 0418  05/08/18   0346   SODIUM  131*  134*   POTASSIUM  3.5*  4.0   CHLORIDE  93*  101   CO2  30  28   GLUCOSE  90  91   BUN  3*  11   CREATININE  0.55  0.60   CALCIUM  8.9  8.6             Recent Labs      05/07/18   0418   TRIGLYCERIDE  79   HDL  98   LDL  126*          Assessment/Plan     * CVA (cerebral vascular accident) (HCC)   Assessment & Plan    Right facial droop  Mild confusion, baseline unclear  MRI shows multiple old infarcts, no acute findings  Echo and carotids pending  Continue ASA and  lipitor.  SNF pending        Seizure disorder (HCC)   Assessment & Plan    Patient reports noncompliance with seizure medications, and possible seizure as cause for altered state on day of admission.   Continue home dose of dilantin and keppra.          History of CVA (cerebrovascular accident)   Assessment & Plan    With right sided weakness, mild which is likely chronic    Continue ASA and statin.        Alcohol withdrawal (HCC)- (present on admission)   Assessment & Plan    No s/o w/d, following  CIWA protocol  Continue thiamine, folic acid, and multivitamin.         Hypokalemia- (present on admission)   Assessment & Plan    Resolved        Hyponatremia- (present on admission)   Assessment & Plan    Mild  Improving          Quality-Core Measures

## 2018-05-08 NOTE — PROGRESS NOTES
Monitor summary: SR 69-90, MS 0.16, QRS 0.10, QT 0.38, with rare PVCs per strip from monitor room.

## 2018-05-08 NOTE — PROGRESS NOTES
"Assumed care, pt awake, sitting up in bed. A&Ox2, self and day pt answered day of the week and year \"Tuesday, 2018\". Pt denies n/v and pain. Pt 1 person assist with FWW. Pt 02 sats 91% at r.a. Tele monitor in place. bed in lowest position. Call light within reach. Will continue hourly rounding.   "

## 2018-05-08 NOTE — EEG PROGRESS NOTE
VIDEO ELECTROENCEPHALOGRAM REPORT      Referring MD: Dr. Leyva.    DOS: 5/7/2018 (total recording of 25 minutes).     INDICATION:  Crescencio Eller 63 y.o. male presenting with stroke like symptoms, h/o seizures.     CURRENT ANTIEPILEPTIC REGIMEN: Phenytoin, Levetiracetam, Lorazepam.     TECHNIQUE: 30 channel video electroencephalogram (EEG) was performed in accordance with the international 10-20 system. The study was reviewed in bipolar and referential montages. The recording examined the patient during wakeful and drowsy state(s).     DESCRIPTION OF THE RECORD:  During the wakefulness, the background showed a diffuse symmetrical 16-20 Hz beta activity posteriorly.  There was no reactivity to eye closure/opening.  There was loss of the normal anterior-posterior gradient. During drowsiness, alpha/theta frequencies were seen.    ACTIVATION PROCEDURES:   Not performed.     ICTAL AND/OR INTERICTAL FINDINGS:   No focal or generalized epileptiform activity noted. No regional slowing was seen during this routine study.  No clinical events or seizures were reported or recorded during the study.     EKG: sampling of the EKG recording demonstrated sinus rhythm.     EVENTS:  None.     INTERPRETATION:  This is a normal video EEG recording in the awake and drowsy state(s).  Clinical correlation is recommended.    Note: A normal EEG does not rule out epilepsy.  If the clinical suspicion remains high for seizures, a prolonged recording to capture clinical or subclinical events may be helpful.        Molina Santos MD   Epilepsy and Neurodiagnostics.   Clinical  of Neurology [facility].   Diplomate in Neurology, Epilepsy, and Electrodiagnostic Medicine.   Office: [phone]  Fax: [phone]

## 2018-05-08 NOTE — PROCEDURES
VIDEO ELECTROENCEPHALOGRAM REPORT        Referring MD: Dr. Leyva.     DOS: 5/7/2018 (total recording of 25 minutes).      INDICATION:  Crescencio Eller 63 y.o. male presenting with stroke like symptoms, h/o seizures.      CURRENT ANTIEPILEPTIC REGIMEN: Phenytoin, Levetiracetam, Lorazepam.      TECHNIQUE: 30 channel video electroencephalogram (EEG) was performed in accordance with the international 10-20 system. The study was reviewed in bipolar and referential montages. The recording examined the patient during wakeful and drowsy state(s).      DESCRIPTION OF THE RECORD:  During the wakefulness, the background showed a diffuse symmetrical 16-20 Hz beta activity posteriorly.  There was no reactivity to eye closure/opening.  There was loss of the normal anterior-posterior gradient. During drowsiness, alpha/theta frequencies were seen.     ACTIVATION PROCEDURES:   Not performed.      ICTAL AND/OR INTERICTAL FINDINGS:   No focal or generalized epileptiform activity noted. No regional slowing was seen during this routine study.  No clinical events or seizures were reported or recorded during the study.      EKG: sampling of the EKG recording demonstrated sinus rhythm.      EVENTS:  None.      INTERPRETATION:  This is a normal video EEG recording in the awake and drowsy state(s).  Clinical correlation is recommended.     Note: A normal EEG does not rule out epilepsy.  If the clinical suspicion remains high for seizures, a prolonged recording to capture clinical or subclinical events may be helpful.           Molina Santos MD   Epilepsy and Neurodiagnostics.   Clinical  of Neurology [facility].   Diplomate in Neurology, Epilepsy, and Electrodiagnostic Medicine.   Office: [phone]  Fax: [phone]       GORDO BOGGS    DD:  05/07/2018 18:39:13  DT:  05/07/2018 19:36:10    D#:  [id number]  Job#:  [id number]

## 2018-05-09 LAB
EKG IMPRESSION: NORMAL
EST. AVERAGE GLUCOSE BLD GHB EST-MCNC: 114 MG/DL
HBA1C MFR BLD: 5.6 % (ref 0–5.6)
MAGNESIUM SERPL-MCNC: 2 MG/DL (ref 1.5–2.5)
PHOSPHATE SERPL-MCNC: 3.4 MG/DL (ref 2.5–4.5)

## 2018-05-09 PROCEDURE — 83735 ASSAY OF MAGNESIUM: CPT

## 2018-05-09 PROCEDURE — 99232 SBSQ HOSP IP/OBS MODERATE 35: CPT | Performed by: HOSPITALIST

## 2018-05-09 PROCEDURE — A9270 NON-COVERED ITEM OR SERVICE: HCPCS | Performed by: HOSPITALIST

## 2018-05-09 PROCEDURE — 36415 COLL VENOUS BLD VENIPUNCTURE: CPT

## 2018-05-09 PROCEDURE — 700111 HCHG RX REV CODE 636 W/ 250 OVERRIDE (IP): Performed by: HOSPITALIST

## 2018-05-09 PROCEDURE — 770020 HCHG ROOM/CARE - TELE (206)

## 2018-05-09 PROCEDURE — 700102 HCHG RX REV CODE 250 W/ 637 OVERRIDE(OP): Performed by: NURSE PRACTITIONER

## 2018-05-09 PROCEDURE — A9270 NON-COVERED ITEM OR SERVICE: HCPCS | Performed by: NURSE PRACTITIONER

## 2018-05-09 PROCEDURE — 770006 HCHG ROOM/CARE - MED/SURG/GYN SEMI*

## 2018-05-09 PROCEDURE — 93010 ELECTROCARDIOGRAM REPORT: CPT | Performed by: INTERNAL MEDICINE

## 2018-05-09 PROCEDURE — 93005 ELECTROCARDIOGRAM TRACING: CPT | Performed by: HOSPITALIST

## 2018-05-09 PROCEDURE — 700102 HCHG RX REV CODE 250 W/ 637 OVERRIDE(OP): Performed by: HOSPITALIST

## 2018-05-09 PROCEDURE — 84100 ASSAY OF PHOSPHORUS: CPT

## 2018-05-09 RX ADMIN — LEVETIRACETAM 500 MG: 100 SOLUTION ORAL at 20:04

## 2018-05-09 RX ADMIN — PHENYTOIN 100 MG: 125 SUSPENSION ORAL at 08:00

## 2018-05-09 RX ADMIN — PHENYTOIN 100 MG: 125 SUSPENSION ORAL at 18:00

## 2018-05-09 RX ADMIN — STANDARDIZED SENNA CONCENTRATE AND DOCUSATE SODIUM 2 TABLET: 8.6; 5 TABLET, FILM COATED ORAL at 20:04

## 2018-05-09 RX ADMIN — ASPIRIN 324 MG: 81 TABLET, CHEWABLE ORAL at 07:56

## 2018-05-09 RX ADMIN — THERA TABS 1 TABLET: TAB at 07:58

## 2018-05-09 RX ADMIN — METOPROLOL TARTRATE 25 MG: 25 TABLET, FILM COATED ORAL at 20:04

## 2018-05-09 RX ADMIN — LEVETIRACETAM 500 MG: 100 SOLUTION ORAL at 08:00

## 2018-05-09 RX ADMIN — STANDARDIZED SENNA CONCENTRATE AND DOCUSATE SODIUM 2 TABLET: 8.6; 5 TABLET, FILM COATED ORAL at 07:57

## 2018-05-09 RX ADMIN — ENOXAPARIN SODIUM 40 MG: 100 INJECTION SUBCUTANEOUS at 07:58

## 2018-05-09 RX ADMIN — METOPROLOL TARTRATE 25 MG: 25 TABLET, FILM COATED ORAL at 07:57

## 2018-05-09 RX ADMIN — ATORVASTATIN CALCIUM 40 MG: 40 TABLET, FILM COATED ORAL at 20:04

## 2018-05-09 RX ADMIN — FOLIC ACID 1 MG: 1 TABLET ORAL at 07:57

## 2018-05-09 RX ADMIN — Medication 100 MG: at 07:58

## 2018-05-09 ASSESSMENT — LIFESTYLE VARIABLES
NAUSEA AND VOMITING: NO NAUSEA AND NO VOMITING
AUDITORY DISTURBANCES: NOT PRESENT
ANXIETY: NO ANXIETY (AT EASE)
TOTAL SCORE: 4
ORIENTATION AND CLOUDING OF SENSORIUM: DISORIENTED FOR PLACE AND / OR PERSON
AGITATION: NORMAL ACTIVITY
ORIENTATION AND CLOUDING OF SENSORIUM: DATE DISORIENTATION BY MORE THAN TWO CALENDAR DAYS
AGITATION: NORMAL ACTIVITY
ORIENTATION AND CLOUDING OF SENSORIUM: DATE DISORIENTATION BY MORE THAN TWO CALENDAR DAYS
TOTAL SCORE: 3
TOTAL SCORE: 3
TREMOR: NO TREMOR
PAROXYSMAL SWEATS: NO SWEAT VISIBLE
ANXIETY: NO ANXIETY (AT EASE)
NAUSEA AND VOMITING: NO NAUSEA AND NO VOMITING
TOTAL SCORE: 3
HEADACHE, FULLNESS IN HEAD: NOT PRESENT
AGITATION: NORMAL ACTIVITY
ANXIETY: NO ANXIETY (AT EASE)
PAROXYSMAL SWEATS: NO SWEAT VISIBLE
TREMOR: NO TREMOR
HEADACHE, FULLNESS IN HEAD: NOT PRESENT
AUDITORY DISTURBANCES: NOT PRESENT
VISUAL DISTURBANCES: NOT PRESENT
AUDITORY DISTURBANCES: NOT PRESENT
NAUSEA AND VOMITING: NO NAUSEA AND NO VOMITING
TREMOR: NO TREMOR
AGITATION: NORMAL ACTIVITY
VISUAL DISTURBANCES: NOT PRESENT
PAROXYSMAL SWEATS: NO SWEAT VISIBLE
HEADACHE, FULLNESS IN HEAD: NOT PRESENT
NAUSEA AND VOMITING: NO NAUSEA AND NO VOMITING
HEADACHE, FULLNESS IN HEAD: NOT PRESENT
TREMOR: NO TREMOR
PAROXYSMAL SWEATS: NO SWEAT VISIBLE
VISUAL DISTURBANCES: NOT PRESENT
HEADACHE, FULLNESS IN HEAD: NOT PRESENT
PAROXYSMAL SWEATS: NO SWEAT VISIBLE
ANXIETY: NO ANXIETY (AT EASE)
ORIENTATION AND CLOUDING OF SENSORIUM: DISORIENTED FOR PLACE AND / OR PERSON
AUDITORY DISTURBANCES: NOT PRESENT
AGITATION: NORMAL ACTIVITY
TOTAL SCORE: 4
VISUAL DISTURBANCES: NOT PRESENT
NAUSEA AND VOMITING: NO NAUSEA AND NO VOMITING
VISUAL DISTURBANCES: NOT PRESENT
AUDITORY DISTURBANCES: NOT PRESENT
ORIENTATION AND CLOUDING OF SENSORIUM: DATE DISORIENTATION BY MORE THAN TWO CALENDAR DAYS
TREMOR: NO TREMOR
ANXIETY: NO ANXIETY (AT EASE)

## 2018-05-09 ASSESSMENT — PATIENT HEALTH QUESTIONNAIRE - PHQ9
2. FEELING DOWN, DEPRESSED, IRRITABLE, OR HOPELESS: NOT AT ALL
SUM OF ALL RESPONSES TO PHQ9 QUESTIONS 1 AND 2: 0
1. LITTLE INTEREST OR PLEASURE IN DOING THINGS: NOT AT ALL

## 2018-05-09 ASSESSMENT — ENCOUNTER SYMPTOMS
MUSCULOSKELETAL NEGATIVE: 1
EYES NEGATIVE: 1
RESPIRATORY NEGATIVE: 1
CONSTITUTIONAL NEGATIVE: 1
GASTROINTESTINAL NEGATIVE: 1
FOCAL WEAKNESS: 1

## 2018-05-09 ASSESSMENT — PAIN SCALES - GENERAL: PAINLEVEL_OUTOF10: 0

## 2018-05-09 NOTE — DISCHARGE PLANNING
SW spoke with pt's sister Shelby. Daisy is pt's oldest sister. Per Shelby, Daisy is upset with pt's drinking habits and will likely not want to participate in any decision making for pt. Shelby to discuss choice with her sisters and will call SW back tomorrow.     Per Shelby, pt has a daughter in [place] that is estranged from the family. Her contact and exact location is unknown to family. No results found on [employer]. SW to use pt's siblings for SNF choice.

## 2018-05-09 NOTE — PROGRESS NOTES
[facility]    Date of Service: 2018    Chief Complaint  63 y.o. male admitted 2018 with acute on chronic right sided weakness and word finding difficulties    Interval Problem Update  Remains alert and oriented x 1  Calm and cooperative  I had a long conversation with patient's grand daughter who is his \"caregiver\", she lives   Next door and checks on him occasionally but states he is generally alone and drunk and likely not taking his medications.  His baseline is difficult to assess as he is usually drunk      Consultants/Specialty      Disposition  snf        Review of Systems   Constitutional: Negative.    HENT: Negative.    Eyes: Negative.    Respiratory: Negative.    Gastrointestinal: Negative.    Genitourinary: Negative.    Musculoskeletal: Negative.    Skin: Negative.    Neurological: Positive for focal weakness.   Endo/Heme/Allergies: Negative.       Physical Exam  Laboratory/Imaging   Hemodynamics  Temp (24hrs), Av.5 °C (97.7 °F), Min:36.3 °C (97.4 °F), Max:37 °C (98.6 °F)   Temperature: 36.4 °C (97.5 °F)  Pulse  Av.7  Min: 59  Max: 93    Blood Pressure: 129/84      Respiratory      Respiration: 16, Pulse Oximetry: 96 %        RUL Breath Sounds: Clear, RML Breath Sounds: Clear, RLL Breath Sounds: Clear, MOHSEN Breath Sounds: Clear, LLL Breath Sounds: Clear    Fluids  No intake or output data in the 24 hours ending 18 1446    Nutrition  Orders Placed This Encounter   Procedures   • DIET ORDER     Standing Status:   Standing     Number of Occurrences:   1     Order Specific Question:   Diet:     Answer:   2 Gram Sodium [7]     Order Specific Question:   Macronutrient modifications:     Answer:   Low Cholesterol [7]     Physical Exam   Constitutional: He is oriented to person, place, and time. He appears well-developed and well-nourished. No distress.   HENT:   Head: Atraumatic.   Nose: Nose normal.   Mouth/Throat: No oropharyngeal exudate.   Facial droop, stable "   Eyes: Conjunctivae and EOM are normal. Pupils are equal, round, and reactive to light. Right eye exhibits no discharge. Left eye exhibits no discharge. No scleral icterus.   Neck: Normal range of motion. Neck supple. No JVD present. No tracheal deviation present. No thyromegaly present.   Cardiovascular: Normal rate, regular rhythm, normal heart sounds and intact distal pulses.  Exam reveals no gallop and no friction rub.    No murmur heard.  Pulmonary/Chest: Effort normal and breath sounds normal. No stridor. No respiratory distress. He has no wheezes. He has no rales. He exhibits no tenderness.   Abdominal: Soft. Bowel sounds are normal. He exhibits no distension and no mass. There is no tenderness. There is no rebound and no guarding.   Musculoskeletal: Normal range of motion. He exhibits no edema, tenderness or deformity.   Lymphadenopathy:     He has no cervical adenopathy.   Neurological: He is alert and oriented to person, place, and time. He has normal reflexes. No cranial nerve deficit. He exhibits normal muscle tone. Coordination normal.   Skin: Skin is warm and dry. No rash noted. He is not diaphoretic. No erythema. No pallor.   Psychiatric: He has a normal mood and affect. His behavior is normal. Judgment and thought content normal.   Nursing note and vitals reviewed.      Recent Labs      05/07/18 0418 05/08/18   0345   WBC  4.9  5.4   RBC  3.81*  3.66*   HEMOGLOBIN  12.5*  11.9*   HEMATOCRIT  34.6*  34.8*   MCV  90.8  95.1   MCH  32.8  32.5   MCHC  36.1*  34.2   RDW  47.9  53.6*   PLATELETCT  245  235   MPV  7.1*  7.3*     Recent Labs      05/07/18 0418  05/08/18   0346   SODIUM  131*  134*   POTASSIUM  3.5*  4.0   CHLORIDE  93*  101   CO2  30  28   GLUCOSE  90  91   BUN  3*  11   CREATININE  0.55  0.60   CALCIUM  8.9  8.6             Recent Labs      05/07/18 0418   TRIGLYCERIDE  79   HDL  98   LDL  126*          Assessment/Plan     * CVA (cerebral vascular accident) (HCC)   Assessment & Plan     Right facial droop  Mild confusion, baseline unclear  MRI shows multiple old infarcts, no acute findings  Echo and carotids pending  Continue ASA and lipitor.  SNF pending        Seizure disorder (HCC)   Assessment & Plan    Patient reports noncompliance with seizure medications, and possible seizure as cause for altered state on day of admission.   Continue home dose of dilantin and keppra.          History of CVA (cerebrovascular accident)   Assessment & Plan    With right sided weakness, mild which is likely chronic    Continue ASA and statin.        Alcohol withdrawal (HCC)- (present on admission)   Assessment & Plan    No s/o w/d, following  CIWA protocol  Continue thiamine, folic acid, and multivitamin.         Hypokalemia- (present on admission)   Assessment & Plan    Resolved        Hyponatremia- (present on admission)   Assessment & Plan    Mild  Improving          Quality-Core Measures

## 2018-05-09 NOTE — CARE PLAN
Problem: Safety  Goal: Will remain free from injury  Outcome: PROGRESSING AS EXPECTED  Bed position low, call light within reach, treaded footwear, bed/chair alarm, fall risk education    Problem: Infection  Goal: Will remain free from infection  Outcome: PROGRESSING AS EXPECTED  Hand hygiene and infection prevention education

## 2018-05-09 NOTE — PROGRESS NOTES
Jennifer [phone], granddaughter called for  update. She requests call back from RADHA. I left RADHA a message regarding.

## 2018-05-09 NOTE — CARE PLAN
Problem: Safety  Goal: Will remain free from injury  Outcome: PROGRESSING AS EXPECTED  Pt using call light approp for needs and assistance oob    Problem: Mobility  Goal: Risk for activity intolerance will decrease  Outcome: PROGRESSING AS EXPECTED  Pt is stand by assist with ADL's, reports feeling more steady on his feet, denies dizziness/weakness

## 2018-05-09 NOTE — DISCHARGE PLANNING
SW left VM with pt's sister Daisy requesting call back to discuss SNF choice. RADHA called pt's nephew, Ashish. Daisy is at work and will return call later today.

## 2018-05-10 PROCEDURE — 700111 HCHG RX REV CODE 636 W/ 250 OVERRIDE (IP): Performed by: HOSPITALIST

## 2018-05-10 PROCEDURE — 770006 HCHG ROOM/CARE - MED/SURG/GYN SEMI*

## 2018-05-10 PROCEDURE — 700102 HCHG RX REV CODE 250 W/ 637 OVERRIDE(OP): Performed by: NURSE PRACTITIONER

## 2018-05-10 PROCEDURE — A9270 NON-COVERED ITEM OR SERVICE: HCPCS | Performed by: NURSE PRACTITIONER

## 2018-05-10 PROCEDURE — A9270 NON-COVERED ITEM OR SERVICE: HCPCS | Performed by: HOSPITALIST

## 2018-05-10 PROCEDURE — 770020 HCHG ROOM/CARE - TELE (206)

## 2018-05-10 PROCEDURE — 700102 HCHG RX REV CODE 250 W/ 637 OVERRIDE(OP): Performed by: HOSPITALIST

## 2018-05-10 PROCEDURE — 99232 SBSQ HOSP IP/OBS MODERATE 35: CPT | Performed by: HOSPITALIST

## 2018-05-10 RX ADMIN — PHENYTOIN 100 MG: 125 SUSPENSION ORAL at 17:35

## 2018-05-10 RX ADMIN — METOPROLOL TARTRATE 25 MG: 25 TABLET, FILM COATED ORAL at 20:27

## 2018-05-10 RX ADMIN — ENOXAPARIN SODIUM 40 MG: 100 INJECTION SUBCUTANEOUS at 08:54

## 2018-05-10 RX ADMIN — LEVETIRACETAM 500 MG: 100 SOLUTION ORAL at 08:54

## 2018-05-10 RX ADMIN — FOLIC ACID 1 MG: 1 TABLET ORAL at 08:54

## 2018-05-10 RX ADMIN — STANDARDIZED SENNA CONCENTRATE AND DOCUSATE SODIUM 2 TABLET: 8.6; 5 TABLET, FILM COATED ORAL at 08:54

## 2018-05-10 RX ADMIN — ATORVASTATIN CALCIUM 40 MG: 40 TABLET, FILM COATED ORAL at 20:27

## 2018-05-10 RX ADMIN — ASPIRIN 325 MG: 325 TABLET ORAL at 08:54

## 2018-05-10 RX ADMIN — THERA TABS 1 TABLET: TAB at 08:54

## 2018-05-10 RX ADMIN — LEVETIRACETAM 500 MG: 100 SOLUTION ORAL at 20:27

## 2018-05-10 RX ADMIN — METOPROLOL TARTRATE 25 MG: 25 TABLET, FILM COATED ORAL at 08:54

## 2018-05-10 RX ADMIN — Medication 100 MG: at 08:54

## 2018-05-10 RX ADMIN — PHENYTOIN 100 MG: 125 SUSPENSION ORAL at 00:00

## 2018-05-10 RX ADMIN — PHENYTOIN 100 MG: 125 SUSPENSION ORAL at 08:54

## 2018-05-10 ASSESSMENT — ENCOUNTER SYMPTOMS
EYES NEGATIVE: 1
GASTROINTESTINAL NEGATIVE: 1
FOCAL WEAKNESS: 1
CONSTITUTIONAL NEGATIVE: 1
MUSCULOSKELETAL NEGATIVE: 1
RESPIRATORY NEGATIVE: 1

## 2018-05-10 ASSESSMENT — PAIN SCALES - GENERAL
PAINLEVEL_OUTOF10: 0

## 2018-05-10 NOTE — CARE PLAN
Problem: Venous Thromboembolism (VTW)/Deep Vein Thrombosis (DVT) Prevention:  Goal: Patient will participate in Venous Thrombosis (VTE)/Deep Vein Thrombosis (DVT)Prevention Measures  Lovenox ordered and given, SCD ordered and refused.     Problem: Knowledge Deficit  Goal: Knowledge of disease process/condition, treatment plan, diagnostic tests, and medications will improve  POC: Vit therapy, Keppra, Dilantin     Problem: Discharge Barriers/Planning  Goal: Patient's continuum of care needs will be met  Pt sister to speak to SW today for DC planning.

## 2018-05-10 NOTE — DISCHARGE PLANNING
SW called pt's sister, Shelby. Sister has not discussed SNF choice with her siblings yet. SW notified Shelby that referrals will need to be placed before this weekend. Shelby to call her siblings now.     SW to follow up tomorrow if she does not hear back from Shelby today.
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
## 2018-05-10 NOTE — PROGRESS NOTES
Received report, assumed pt care. Pt a&o 1, VSS, assessment completed. Resting comfortably in bed with call light, bedside table in reach. No c/o at this time. Side rails up 2. Instructed to use call light when needing assistance verbalized understanding. Bed in low position. Will continue to monitor.

## 2018-05-10 NOTE — PROGRESS NOTES
[facility] Progress Note    Date of Service: 5/10/2018    Chief Complaint  63 y.o. male admitted 2018 with acute on chronic right sided weakness and word finding difficulties    Interval Problem Update  Remains alert and oriented x 1  Calm and cooperative  Denies paink      Consultants/Specialty      Disposition  snf        Review of Systems   Constitutional: Negative.    HENT: Negative.    Eyes: Negative.    Respiratory: Negative.    Gastrointestinal: Negative.    Genitourinary: Negative.    Musculoskeletal: Negative.    Skin: Negative.    Neurological: Positive for focal weakness.   Endo/Heme/Allergies: Negative.       Physical Exam  Laboratory/Imaging   Hemodynamics  Temp (24hrs), Av.4 °C (97.5 °F), Min:36.2 °C (97.1 °F), Max:36.8 °C (98.2 °F)   Temperature: 36.3 °C (97.4 °F)  Pulse  Av.6  Min: 59  Max: 96    Blood Pressure: 133/90      Respiratory      Respiration: 17, Pulse Oximetry: 92 %        RUL Breath Sounds: Clear, RML Breath Sounds: Clear, RLL Breath Sounds: Clear, MOHSEN Breath Sounds: Clear, LLL Breath Sounds: Clear    Fluids  No intake or output data in the 24 hours ending 05/10/18 1103    Nutrition  Orders Placed This Encounter   Procedures   • DIET ORDER     Standing Status:   Standing     Number of Occurrences:   1     Order Specific Question:   Diet:     Answer:   2 Gram Sodium [7]     Order Specific Question:   Macronutrient modifications:     Answer:   Low Cholesterol [7]     Physical Exam   Constitutional: He is oriented to person, place, and time. He appears well-developed and well-nourished. No distress.   HENT:   Head: Atraumatic.   Nose: Nose normal.   Mouth/Throat: No oropharyngeal exudate.   Facial droop, stable   Eyes: Conjunctivae and EOM are normal. Pupils are equal, round, and reactive to light. Right eye exhibits no discharge. Left eye exhibits no discharge. No scleral icterus.   Neck: Normal range of motion. Neck supple. No JVD present. No tracheal deviation  present. No thyromegaly present.   Cardiovascular: Normal rate, regular rhythm, normal heart sounds and intact distal pulses.  Exam reveals no gallop and no friction rub.    No murmur heard.  Pulmonary/Chest: Effort normal and breath sounds normal. No stridor. No respiratory distress. He has no wheezes. He has no rales. He exhibits no tenderness.   Abdominal: Soft. Bowel sounds are normal. He exhibits no distension and no mass. There is no tenderness. There is no rebound and no guarding.   Musculoskeletal: Normal range of motion. He exhibits no edema, tenderness or deformity.   Lymphadenopathy:     He has no cervical adenopathy.   Neurological: He is alert and oriented to person, place, and time. He has normal reflexes. No cranial nerve deficit. He exhibits normal muscle tone. Coordination normal.   Skin: Skin is warm and dry. No rash noted. He is not diaphoretic. No erythema. No pallor.   Psychiatric: He has a normal mood and affect. His behavior is normal. Judgment and thought content normal.   Nursing note and vitals reviewed.      Recent Labs      05/08/18   0345   WBC  5.4   RBC  3.66*   HEMOGLOBIN  11.9*   HEMATOCRIT  34.8*   MCV  95.1   MCH  32.5   MCHC  34.2   RDW  53.6*   PLATELETCT  235   MPV  7.3*     Recent Labs      05/08/18   0346   SODIUM  134*   POTASSIUM  4.0   CHLORIDE  101   CO2  28   GLUCOSE  91   BUN  11   CREATININE  0.60   CALCIUM  8.6                      Assessment/Plan     * CVA (cerebral vascular accident) ([facility])   Assessment & Plan    Right facial droop  Mild confusion, baseline unclear  MRI shows multiple old infarcts, no acute findings  No significant abnormalities on echo or carotids  Continue ASA and lipitor.  SNF pending        Seizure disorder ([facility])   Assessment & Plan    Patient reports noncompliance with seizure medications, and possible seizure as cause for altered state on day of admission.   Continue home dose of dilantin and keppra.          History of CVA (cerebrovascular  accident)   Assessment & Plan    With right sided weakness, mild - per family this is chronic  Continue ASA and statin.        Alcohol withdrawal (HCC)- (present on admission)   Assessment & Plan    No s/o w/d, following  CIWA protocol  Continue thiamine, folic acid, and multivitamin.         Hypokalemia- (present on admission)   Assessment & Plan    Resolved        Hyponatremia- (present on admission)   Assessment & Plan    Mild  Improved          Quality-Core Measures

## 2018-05-11 PROCEDURE — 700102 HCHG RX REV CODE 250 W/ 637 OVERRIDE(OP): Performed by: HOSPITALIST

## 2018-05-11 PROCEDURE — A9270 NON-COVERED ITEM OR SERVICE: HCPCS | Performed by: HOSPITALIST

## 2018-05-11 PROCEDURE — 770006 HCHG ROOM/CARE - MED/SURG/GYN SEMI*

## 2018-05-11 PROCEDURE — 97112 NEUROMUSCULAR REEDUCATION: CPT

## 2018-05-11 PROCEDURE — 700102 HCHG RX REV CODE 250 W/ 637 OVERRIDE(OP): Performed by: NURSE PRACTITIONER

## 2018-05-11 PROCEDURE — 97116 GAIT TRAINING THERAPY: CPT

## 2018-05-11 PROCEDURE — 99232 SBSQ HOSP IP/OBS MODERATE 35: CPT | Performed by: HOSPITALIST

## 2018-05-11 PROCEDURE — A9270 NON-COVERED ITEM OR SERVICE: HCPCS | Performed by: NURSE PRACTITIONER

## 2018-05-11 PROCEDURE — 700111 HCHG RX REV CODE 636 W/ 250 OVERRIDE (IP): Performed by: HOSPITALIST

## 2018-05-11 RX ADMIN — STANDARDIZED SENNA CONCENTRATE AND DOCUSATE SODIUM 2 TABLET: 8.6; 5 TABLET, FILM COATED ORAL at 21:24

## 2018-05-11 RX ADMIN — ASPIRIN 325 MG: 325 TABLET ORAL at 08:05

## 2018-05-11 RX ADMIN — ENOXAPARIN SODIUM 40 MG: 100 INJECTION SUBCUTANEOUS at 08:05

## 2018-05-11 RX ADMIN — STANDARDIZED SENNA CONCENTRATE AND DOCUSATE SODIUM 2 TABLET: 8.6; 5 TABLET, FILM COATED ORAL at 08:05

## 2018-05-11 RX ADMIN — ATORVASTATIN CALCIUM 40 MG: 40 TABLET, FILM COATED ORAL at 21:24

## 2018-05-11 RX ADMIN — PHENYTOIN 100 MG: 125 SUSPENSION ORAL at 08:05

## 2018-05-11 RX ADMIN — LEVETIRACETAM 500 MG: 100 SOLUTION ORAL at 21:50

## 2018-05-11 RX ADMIN — PHENYTOIN 100 MG: 125 SUSPENSION ORAL at 18:08

## 2018-05-11 RX ADMIN — METOPROLOL TARTRATE 25 MG: 25 TABLET, FILM COATED ORAL at 08:05

## 2018-05-11 RX ADMIN — METOPROLOL TARTRATE 25 MG: 25 TABLET, FILM COATED ORAL at 21:24

## 2018-05-11 RX ADMIN — LEVETIRACETAM 500 MG: 100 SOLUTION ORAL at 08:05

## 2018-05-11 RX ADMIN — PHENYTOIN 100 MG: 125 SUSPENSION ORAL at 01:34

## 2018-05-11 ASSESSMENT — COGNITIVE AND FUNCTIONAL STATUS - GENERAL
SUGGESTED CMS G CODE MODIFIER MOBILITY: CI
CLIMB 3 TO 5 STEPS WITH RAILING: A LITTLE
MOBILITY SCORE: 23

## 2018-05-11 ASSESSMENT — LIFESTYLE VARIABLES
TOTAL SCORE: 3
ORIENTATION AND CLOUDING OF SENSORIUM: DATE DISORIENTATION BY MORE THAN TWO CALENDAR DAYS
ANXIETY: NO ANXIETY (AT EASE)
AUDITORY DISTURBANCES: NOT PRESENT
AGITATION: NORMAL ACTIVITY
PAROXYSMAL SWEATS: NO SWEAT VISIBLE
TREMOR: NO TREMOR
AUDITORY DISTURBANCES: NOT PRESENT
TOTAL SCORE: 3
NAUSEA AND VOMITING: NO NAUSEA AND NO VOMITING
VISUAL DISTURBANCES: NOT PRESENT
AGITATION: NORMAL ACTIVITY
NAUSEA AND VOMITING: NO NAUSEA AND NO VOMITING
NAUSEA AND VOMITING: NO NAUSEA AND NO VOMITING
ANXIETY: NO ANXIETY (AT EASE)
HEADACHE, FULLNESS IN HEAD: NOT PRESENT
PAROXYSMAL SWEATS: NO SWEAT VISIBLE
VISUAL DISTURBANCES: NOT PRESENT
ORIENTATION AND CLOUDING OF SENSORIUM: DATE DISORIENTATION BY MORE THAN TWO CALENDAR DAYS
VISUAL DISTURBANCES: NOT PRESENT
AGITATION: NORMAL ACTIVITY
TOTAL SCORE: 3
TREMOR: NO TREMOR
ANXIETY: NO ANXIETY (AT EASE)
HEADACHE, FULLNESS IN HEAD: NOT PRESENT
PAROXYSMAL SWEATS: NO SWEAT VISIBLE
AUDITORY DISTURBANCES: NOT PRESENT
HEADACHE, FULLNESS IN HEAD: NOT PRESENT
ORIENTATION AND CLOUDING OF SENSORIUM: DATE DISORIENTATION BY MORE THAN TWO CALENDAR DAYS
TREMOR: NO TREMOR

## 2018-05-11 ASSESSMENT — ENCOUNTER SYMPTOMS
GASTROINTESTINAL NEGATIVE: 1
RESPIRATORY NEGATIVE: 1
EYES NEGATIVE: 1
MUSCULOSKELETAL NEGATIVE: 1
CONSTITUTIONAL NEGATIVE: 1
FOCAL WEAKNESS: 1

## 2018-05-11 ASSESSMENT — PAIN SCALES - GENERAL
PAINLEVEL_OUTOF10: 0

## 2018-05-11 ASSESSMENT — GAIT ASSESSMENTS
DEVIATION: ANTALGIC;INCREASED BASE OF SUPPORT;BRADYKINETIC;DECREASED HEEL STRIKE;DECREASED TOE OFF
GAIT LEVEL OF ASSIST: STAND BY ASSIST
DISTANCE (FEET): 350

## 2018-05-11 NOTE — PROGRESS NOTES
[facility]    Date of Service: 2018    Chief Complaint  63 y.o. male admitted 2018 with acute on chronic right sided weakness and word finding difficulties    Interval Problem Update  Denies pain  Alert and oriented x 1  No dizziness      Consultants/Specialty      Disposition  snf        Review of Systems   Constitutional: Negative.    HENT: Negative.    Eyes: Negative.    Respiratory: Negative.    Gastrointestinal: Negative.    Genitourinary: Negative.    Musculoskeletal: Negative.    Skin: Negative.    Neurological: Positive for focal weakness.   Endo/Heme/Allergies: Negative.       Physical Exam  Laboratory/Imaging   Hemodynamics  Temp (24hrs), Av.4 °C (97.5 °F), Min:36.2 °C (97.1 °F), Max:36.6 °C (97.9 °F)   Temperature: 36.5 °C (97.7 °F)  Pulse  Av.2  Min: 59  Max: 96    Blood Pressure: 147/91      Respiratory      Respiration: 18, Pulse Oximetry: 92 %        RUL Breath Sounds: Clear, RML Breath Sounds: Clear, RLL Breath Sounds: Clear, MOHSEN Breath Sounds: Clear, LLL Breath Sounds: Clear    Fluids  No intake or output data in the 24 hours ending 18 1102    Nutrition  Orders Placed This Encounter   Procedures   • DIET ORDER     Standing Status:   Standing     Number of Occurrences:   1     Order Specific Question:   Diet:     Answer:   2 Gram Sodium [7]     Order Specific Question:   Macronutrient modifications:     Answer:   Low Cholesterol [7]     Physical Exam   Constitutional: He is oriented to person, place, and time. He appears well-developed and well-nourished. No distress.   HENT:   Head: Atraumatic.   Nose: Nose normal.   Mouth/Throat: No oropharyngeal exudate.   Facial droop, stable   Eyes: Conjunctivae and EOM are normal. Pupils are equal, round, and reactive to light. Right eye exhibits no discharge. Left eye exhibits no discharge. No scleral icterus.   Neck: Normal range of motion. Neck supple. No JVD present. No tracheal deviation present. No thyromegaly  present.   Cardiovascular: Normal rate, regular rhythm, normal heart sounds and intact distal pulses.  Exam reveals no gallop and no friction rub.    No murmur heard.  Pulmonary/Chest: Effort normal and breath sounds normal. No stridor. No respiratory distress. He has no wheezes. He has no rales. He exhibits no tenderness.   Abdominal: Soft. Bowel sounds are normal. He exhibits no distension and no mass. There is no tenderness. There is no rebound and no guarding.   Musculoskeletal: Normal range of motion. He exhibits no edema, tenderness or deformity.   Lymphadenopathy:     He has no cervical adenopathy.   Neurological: He is alert and oriented to person, place, and time. He has normal reflexes. No cranial nerve deficit. He exhibits normal muscle tone. Coordination normal.   Mild chronic R UE weakness   Skin: Skin is warm and dry. No rash noted. He is not diaphoretic. No erythema. No pallor.   Psychiatric: He has a normal mood and affect. His behavior is normal. Judgment and thought content normal.   Nursing note and vitals reviewed.                               Assessment/Plan     * CVA (cerebral vascular accident) (HCC)   Assessment & Plan    Right facial droop  Mild confusion, baseline unclear  MRI shows multiple old infarcts, no acute findings  No significant abnormalities on echo or carotids  Continue ASA and lipitor.  SNF pending        Seizure disorder (HCC)   Assessment & Plan    Patient reports noncompliance with seizure medications, and possible seizure as cause for altered state on day of admission.   Continue home dose of dilantin and keppra.          History of CVA (cerebrovascular accident)   Assessment & Plan    With right sided weakness, mild - per family this is chronic  Continue ASA and statin.        Alcohol withdrawal (HCC)- (present on admission)   Assessment & Plan    No s/o w/d, following  CIWA protocol  Continue thiamine, folic acid, and multivitamin.         Hypokalemia- (present on  admission)   Assessment & Plan    Resolved        Hyponatremia- (present on admission)   Assessment & Plan    Mild  Improved          Quality-Core Measures

## 2018-05-11 NOTE — PROGRESS NOTES
Pt A/0X1-2   Resting comfortably in bed with side rails up x 2 and call light within reach reminded pt to use call light for help. Bed in low position bed alarm on. Ambulated to bathroom x 1 assist steady gait.  IVF infusing Denies pain will continue to monitor.

## 2018-05-11 NOTE — DISCHARGE PLANNING
TCN met patient at bedside to discuss the care teams recommendation for SNF. Patient is not oriented to be able to make choice. Patient perseverating on medication, drinking and his shoes. TCN called Daisy to discuss SNF choice, Daisy requested choice be faxed to [facility] and blanket to [facility] second. Choice faxed to [facility].

## 2018-05-11 NOTE — DISCHARGE PLANNING
TCN spoke with patient sister marlene regarding care team recommendation for SNF. Marlene feels patient should make his own decision regarding SNF although she is concerned regarding him DC home as he would not have supervision. TCN to meet with patient to discuss recommendation and choice.

## 2018-05-11 NOTE — DISCHARGE PLANNING
Received Choice form from Kass[facility] CASTILLO).  Referral has been sent to all [facility] including [facility] in [place].

## 2018-05-11 NOTE — THERAPY
"Pt w/improved balance, gait, and activity tolerance. Pt demonstrated no frnak LOB though seemed unsteady. Pt w/poor safety awareness, requrirng verbal cjuing. Pt demonstrated an antalgic gait apttern which seemed inconsistent. Pt would benefit from further acute PT txs to progress towards goals and independence. Depending on pt progress and amb with nursing staff, pt may require placement to address balance deficits.    Pt to ambulate w/nursing staff    Physical Therapy Treatment completed.   Bed Mobility:  Supine to Sit: Modified Independent  Transfers: Sit to Stand: Supervised  Gait: Level Of Assist: Stand by Assist with No Equipment Needed       Plan of Care: Will benefit from Physical Therapy 2 times per week    See \"Rehab Therapy-Acute\" Patient Summary Report for complete documentation.       "

## 2018-05-11 NOTE — DISCHARGE PLANNING
TCN left VM with patient sister to discuss DC plan. Awaiting call back. TCN to meet with patient later in date as well to determine if he would be capable of making his own choice.

## 2018-05-12 PROCEDURE — A9270 NON-COVERED ITEM OR SERVICE: HCPCS | Performed by: HOSPITALIST

## 2018-05-12 PROCEDURE — 700102 HCHG RX REV CODE 250 W/ 637 OVERRIDE(OP): Performed by: HOSPITALIST

## 2018-05-12 PROCEDURE — 99232 SBSQ HOSP IP/OBS MODERATE 35: CPT | Performed by: HOSPITALIST

## 2018-05-12 PROCEDURE — 700102 HCHG RX REV CODE 250 W/ 637 OVERRIDE(OP): Performed by: NURSE PRACTITIONER

## 2018-05-12 PROCEDURE — A9270 NON-COVERED ITEM OR SERVICE: HCPCS | Performed by: NURSE PRACTITIONER

## 2018-05-12 PROCEDURE — 700111 HCHG RX REV CODE 636 W/ 250 OVERRIDE (IP): Performed by: HOSPITALIST

## 2018-05-12 PROCEDURE — 770006 HCHG ROOM/CARE - MED/SURG/GYN SEMI*

## 2018-05-12 RX ADMIN — ENOXAPARIN SODIUM 40 MG: 100 INJECTION SUBCUTANEOUS at 10:03

## 2018-05-12 RX ADMIN — LEVETIRACETAM 500 MG: 100 SOLUTION ORAL at 22:18

## 2018-05-12 RX ADMIN — STANDARDIZED SENNA CONCENTRATE AND DOCUSATE SODIUM 2 TABLET: 8.6; 5 TABLET, FILM COATED ORAL at 21:16

## 2018-05-12 RX ADMIN — PHENYTOIN 100 MG: 125 SUSPENSION ORAL at 10:02

## 2018-05-12 RX ADMIN — LEVETIRACETAM 500 MG: 100 SOLUTION ORAL at 10:02

## 2018-05-12 RX ADMIN — ATORVASTATIN CALCIUM 40 MG: 40 TABLET, FILM COATED ORAL at 21:16

## 2018-05-12 RX ADMIN — PHENYTOIN 100 MG: 125 SUSPENSION ORAL at 17:27

## 2018-05-12 RX ADMIN — PHENYTOIN 100 MG: 125 SUSPENSION ORAL at 01:03

## 2018-05-12 RX ADMIN — ASPIRIN 325 MG: 325 TABLET ORAL at 10:02

## 2018-05-12 RX ADMIN — METOPROLOL TARTRATE 25 MG: 25 TABLET, FILM COATED ORAL at 10:02

## 2018-05-12 RX ADMIN — METOPROLOL TARTRATE 25 MG: 25 TABLET, FILM COATED ORAL at 21:16

## 2018-05-12 ASSESSMENT — PAIN SCALES - GENERAL
PAINLEVEL_OUTOF10: 0

## 2018-05-12 ASSESSMENT — LIFESTYLE VARIABLES
AGITATION: NORMAL ACTIVITY
PAROXYSMAL SWEATS: NO SWEAT VISIBLE
ORIENTATION AND CLOUDING OF SENSORIUM: DATE DISORIENTATION BY MORE THAN TWO CALENDAR DAYS
TOTAL SCORE: 3
PAROXYSMAL SWEATS: NO SWEAT VISIBLE
ORIENTATION AND CLOUDING OF SENSORIUM: DATE DISORIENTATION BY MORE THAN TWO CALENDAR DAYS
ANXIETY: NO ANXIETY (AT EASE)
NAUSEA AND VOMITING: NO NAUSEA AND NO VOMITING
AGITATION: NORMAL ACTIVITY
TREMOR: NO TREMOR
VISUAL DISTURBANCES: NOT PRESENT
VISUAL DISTURBANCES: NOT PRESENT
ANXIETY: NO ANXIETY (AT EASE)
ANXIETY: NO ANXIETY (AT EASE)
HEADACHE, FULLNESS IN HEAD: NOT PRESENT
AUDITORY DISTURBANCES: NOT PRESENT
AGITATION: NORMAL ACTIVITY
NAUSEA AND VOMITING: NO NAUSEA AND NO VOMITING
TOTAL SCORE: 3
PAROXYSMAL SWEATS: NO SWEAT VISIBLE
HEADACHE, FULLNESS IN HEAD: NOT PRESENT
AUDITORY DISTURBANCES: NOT PRESENT
VISUAL DISTURBANCES: NOT PRESENT
NAUSEA AND VOMITING: NO NAUSEA AND NO VOMITING
ORIENTATION AND CLOUDING OF SENSORIUM: DATE DISORIENTATION BY MORE THAN TWO CALENDAR DAYS
TREMOR: NO TREMOR
HEADACHE, FULLNESS IN HEAD: NOT PRESENT
TOTAL SCORE: 3
TREMOR: NO TREMOR
AUDITORY DISTURBANCES: NOT PRESENT

## 2018-05-12 ASSESSMENT — ENCOUNTER SYMPTOMS
MUSCULOSKELETAL NEGATIVE: 1
CONSTITUTIONAL NEGATIVE: 1
GASTROINTESTINAL NEGATIVE: 1
EYES NEGATIVE: 1
RESPIRATORY NEGATIVE: 1

## 2018-05-12 NOTE — CARE PLAN
Problem: Medication  Goal: Compliance with prescribed medication will improve  Outcome: PROGRESSING AS EXPECTED  Pt very compliant with PO med administration. Pt actually asked for his medications first thing during our first interaction.     Problem: Respiratory:  Goal: Respiratory status will improve  Outcome: PROGRESSING AS EXPECTED  Pt breathing on room air. Breathing even and unlabored. No SOB or resp distress. Will continue to monitor.

## 2018-05-12 NOTE — PROGRESS NOTES
Pt A/OX3  Short term memory . Pt calls for help to bathroom ambulates to bathroom with steady gait. Denies pain. Encourage po intake. Call light within reach.

## 2018-05-12 NOTE — PROGRESS NOTES
Pt alert. Disoriented to current month/year. Pt reoriented. Pt has a short memory. Staff anticipates needs. No SOB or resp distress. No c/o pain. Pt ambulates with a steady gait. No assistive devices. Continent of B/B. Skin kept clean warm and dry. Pt does self janay-care. Okay to have no IV access per Dr. Owusu. Fluids encouraged when awake. Refusing SCDs. All needs met, call light within reach, will continue to monitor.

## 2018-05-12 NOTE — PROGRESS NOTES
[facility] Hospitalist Progress Note    Date of Service: 2018    Chief Complaint  63 y.o. male admitted 2018 with acute on chronic right sided weakness and word finding difficulties    Interval Problem Update  Axox 2 today, now knows we are in the hospital  Denies pain  No dizziness  Following complex commands      Consultants/Specialty      Disposition  snf        Review of Systems   Constitutional: Negative.    HENT: Negative.    Eyes: Negative.    Respiratory: Negative.    Gastrointestinal: Negative.    Genitourinary: Negative.    Musculoskeletal: Negative.    Skin: Negative.    Endo/Heme/Allergies: Negative.       Physical Exam  Laboratory/Imaging   Hemodynamics  Temp (24hrs), Av.6 °C (97.9 °F), Min:36.3 °C (97.3 °F), Max:36.8 °C (98.3 °F)   Temperature: 36.7 °C (98.1 °F)  Pulse  Av.1  Min: 59  Max: 96    Blood Pressure: 138/88      Respiratory      Respiration: 18, Pulse Oximetry: 93 %        RUL Breath Sounds: Clear, RML Breath Sounds: Clear, RLL Breath Sounds: Clear, MOHSEN Breath Sounds: Clear, LLL Breath Sounds: Clear    Fluids  No intake or output data in the 24 hours ending 18 1103    Nutrition  Orders Placed This Encounter   Procedures   • DIET ORDER     Standing Status:   Standing     Number of Occurrences:   1     Order Specific Question:   Diet:     Answer:   2 Gram Sodium [7]     Order Specific Question:   Macronutrient modifications:     Answer:   Low Cholesterol [7]     Physical Exam   Constitutional: He is oriented to person, place, and time. He appears well-developed and well-nourished. No distress.   HENT:   Head: Atraumatic.   Nose: Nose normal.   Mouth/Throat: No oropharyngeal exudate.   Facial droop, stable   Eyes: Conjunctivae and EOM are normal. Pupils are equal, round, and reactive to light. Right eye exhibits no discharge. Left eye exhibits no discharge. No scleral icterus.   Neck: Normal range of motion. Neck supple. No JVD present. No tracheal deviation present. No  thyromegaly present.   Cardiovascular: Normal rate, regular rhythm, normal heart sounds and intact distal pulses.  Exam reveals no gallop and no friction rub.    No murmur heard.  Pulmonary/Chest: Effort normal and breath sounds normal. No stridor. No respiratory distress. He has no wheezes. He has no rales. He exhibits no tenderness.   Abdominal: Soft. Bowel sounds are normal. He exhibits no distension and no mass. There is no tenderness. There is no rebound and no guarding.   Musculoskeletal: Normal range of motion. He exhibits no edema, tenderness or deformity.   Lymphadenopathy:     He has no cervical adenopathy.   Neurological: He is alert and oriented to person, place, and time. He has normal reflexes. No cranial nerve deficit. He exhibits normal muscle tone. Coordination normal.   Mild chronic R UE weakness   Skin: Skin is warm and dry. No rash noted. He is not diaphoretic. No erythema. No pallor.   Psychiatric: He has a normal mood and affect. His behavior is normal. Judgment and thought content normal.   Nursing note and vitals reviewed.                               Assessment/Plan     * CVA (cerebral vascular accident) (HCC)   Assessment & Plan    Right facial droop  Mild confusion, baseline unclear  MRI shows multiple old infarcts, no acute findings  No significant abnormalities on echo or carotids  Continue ASA and lipitor.  SNF pending        Seizure disorder ([facility])   Assessment & Plan    Patient reports noncompliance with seizure medications, and possible seizure as cause for altered state on day of admission.   Continue home dose of dilantin and keppra.          History of CVA (cerebrovascular accident)   Assessment & Plan    Mild, chronic right sided weakness  Continue ASA and statin.        Alcohol withdrawal (HCC)- (present on admission)   Assessment & Plan    No signs of withdraw  Continue thiamine, folic acid, and multivitamin.         Hypokalemia- (present on admission)   Assessment & Plan     Resolved        Hyponatremia- (present on admission)   Assessment & Plan    Mild  Improved          Quality-Core Measures

## 2018-05-12 NOTE — PROGRESS NOTES
Pt alert and oriented to self place and event. Short term memory noted. Pt reoriented to current month/year and why he is here with us at [facility]. Pt is able to make simplex needs known. VS stable. Afebrile. Pt keeps talking about going to skilled nursing facility. No SOB or resp distress. No c/o pain. Continent of B/B. Pt does self janay-care. BM today. Fluids encouraged when awake. No peripheral edema noted. All needs met, call light within reach, will continue to monitor.

## 2018-05-12 NOTE — CARE PLAN
Problem: Bowel/Gastric:  Goal: Normal bowel function is maintained or improved  Outcome: PROGRESSING AS EXPECTED  Pt had a BM today. Continent. Skin kept clean warm and dry. Will continue to monitor.    Problem: Pain Management  Goal: Pain level will decrease to patient's comfort goal  Outcome: PROGRESSING AS EXPECTED  Pt has had absolutely no c/o pain. Repositions self. Meds given whole with ease. Fluids encouraged. Will continue to monitor.

## 2018-05-12 NOTE — CARE PLAN
Problem: Safety  Goal: Will remain free from injury  Outcome: PROGRESSING AS EXPECTED  Pt uses call light for help to bathroom steady gait standby assist. Bed alarm on while in bed.

## 2018-05-12 NOTE — DISCHARGE PLANNING
Medical Social Worker    Pt discussed in rounds. Per attending MD, pt is medically cleared. Both [facility] and [facility] are requesting updated therapy notes.

## 2018-05-13 PROCEDURE — 99232 SBSQ HOSP IP/OBS MODERATE 35: CPT | Performed by: HOSPITALIST

## 2018-05-13 PROCEDURE — 700102 HCHG RX REV CODE 250 W/ 637 OVERRIDE(OP): Performed by: HOSPITALIST

## 2018-05-13 PROCEDURE — 770006 HCHG ROOM/CARE - MED/SURG/GYN SEMI*

## 2018-05-13 PROCEDURE — A9270 NON-COVERED ITEM OR SERVICE: HCPCS | Performed by: NURSE PRACTITIONER

## 2018-05-13 PROCEDURE — A9270 NON-COVERED ITEM OR SERVICE: HCPCS | Performed by: HOSPITALIST

## 2018-05-13 PROCEDURE — 700111 HCHG RX REV CODE 636 W/ 250 OVERRIDE (IP): Performed by: HOSPITALIST

## 2018-05-13 PROCEDURE — 700102 HCHG RX REV CODE 250 W/ 637 OVERRIDE(OP): Performed by: NURSE PRACTITIONER

## 2018-05-13 RX ADMIN — LEVETIRACETAM 500 MG: 100 SOLUTION ORAL at 09:47

## 2018-05-13 RX ADMIN — ASPIRIN 325 MG: 325 TABLET ORAL at 09:41

## 2018-05-13 RX ADMIN — ATORVASTATIN CALCIUM 40 MG: 40 TABLET, FILM COATED ORAL at 19:49

## 2018-05-13 RX ADMIN — PHENYTOIN 100 MG: 125 SUSPENSION ORAL at 17:32

## 2018-05-13 RX ADMIN — LEVETIRACETAM 500 MG: 100 SOLUTION ORAL at 19:49

## 2018-05-13 RX ADMIN — ENOXAPARIN SODIUM 40 MG: 100 INJECTION SUBCUTANEOUS at 09:42

## 2018-05-13 RX ADMIN — METOPROLOL TARTRATE 25 MG: 25 TABLET, FILM COATED ORAL at 19:49

## 2018-05-13 RX ADMIN — STANDARDIZED SENNA CONCENTRATE AND DOCUSATE SODIUM 2 TABLET: 8.6; 5 TABLET, FILM COATED ORAL at 09:00

## 2018-05-13 RX ADMIN — PHENYTOIN 100 MG: 125 SUSPENSION ORAL at 01:20

## 2018-05-13 RX ADMIN — PHENYTOIN 100 MG: 125 SUSPENSION ORAL at 09:44

## 2018-05-13 RX ADMIN — METOPROLOL TARTRATE 25 MG: 25 TABLET, FILM COATED ORAL at 09:41

## 2018-05-13 ASSESSMENT — PAIN SCALES - GENERAL
PAINLEVEL_OUTOF10: 0
PAINLEVEL_OUTOF10: 0

## 2018-05-13 ASSESSMENT — ENCOUNTER SYMPTOMS
RESPIRATORY NEGATIVE: 1
GASTROINTESTINAL NEGATIVE: 1
EYES NEGATIVE: 1
CONSTITUTIONAL NEGATIVE: 1
MUSCULOSKELETAL NEGATIVE: 1

## 2018-05-13 NOTE — PROGRESS NOTES
[facility] Hospitalist Progress Note    Date of Service: 2018    Chief Complaint  63 y.o. male admitted 2018 with acute on chronic right sided weakness and word finding difficulties    Interval Problem Update  Calm and cooperative  Alert and oriented to person and place  Denies pain  No dizziness  Following complex commands      Consultants/Specialty      Disposition  snf        Review of Systems   Constitutional: Negative.    HENT: Negative.    Eyes: Negative.    Respiratory: Negative.    Gastrointestinal: Negative.    Genitourinary: Negative.    Musculoskeletal: Negative.    Skin: Negative.    Endo/Heme/Allergies: Negative.       Physical Exam  Laboratory/Imaging   Hemodynamics  Temp (24hrs), Av.8 °C (98.3 °F), Min:36.6 °C (97.8 °F), Max:37.4 °C (99.4 °F)   Temperature: 37.4 °C (99.4 °F)  Pulse  Av.9  Min: 59  Max: 96    Blood Pressure: 130/76      Respiratory      Respiration: 18, Pulse Oximetry: 92 %        RUL Breath Sounds: Clear, RML Breath Sounds: Clear, RLL Breath Sounds: Clear, MOHSEN Breath Sounds: Clear, LLL Breath Sounds: Clear    Fluids  No intake or output data in the 24 hours ending 18 1013    Nutrition  Orders Placed This Encounter   Procedures   • DIET ORDER     Standing Status:   Standing     Number of Occurrences:   1     Order Specific Question:   Diet:     Answer:   2 Gram Sodium [7]     Order Specific Question:   Macronutrient modifications:     Answer:   Low Cholesterol [7]     Physical Exam   Constitutional: He is oriented to person, place, and time. He appears well-developed and well-nourished. No distress.   HENT:   Head: Atraumatic.   Nose: Nose normal.   Mouth/Throat: No oropharyngeal exudate.   Facial droop, stable   Eyes: Conjunctivae and EOM are normal. Pupils are equal, round, and reactive to light. Right eye exhibits no discharge. Left eye exhibits no discharge. No scleral icterus.   Neck: Normal range of motion. Neck supple. No JVD present. No tracheal deviation  present. No thyromegaly present.   Cardiovascular: Normal rate, regular rhythm, normal heart sounds and intact distal pulses.  Exam reveals no gallop and no friction rub.    No murmur heard.  Pulmonary/Chest: Effort normal and breath sounds normal. No stridor. No respiratory distress. He has no wheezes. He has no rales. He exhibits no tenderness.   Abdominal: Soft. Bowel sounds are normal. He exhibits no distension and no mass. There is no tenderness. There is no rebound and no guarding.   Musculoskeletal: Normal range of motion. He exhibits no edema, tenderness or deformity.   Lymphadenopathy:     He has no cervical adenopathy.   Neurological: He is alert and oriented to person, place, and time. He has normal reflexes. No cranial nerve deficit. He exhibits normal muscle tone. Coordination normal.   Mild chronic R UE weakness   Skin: Skin is warm and dry. No rash noted. He is not diaphoretic. No erythema. No pallor.   Psychiatric: He has a normal mood and affect. His behavior is normal. Judgment and thought content normal.   Nursing note and vitals reviewed.                               Assessment/Plan     * CVA (cerebral vascular accident) (HCC)   Assessment & Plan    Right facial droop  Baseline decreased cognition  MRI shows multiple old infarcts, no acute findings  No significant abnormalities on echo or carotids  Continue ASA and lipitor.  SNF pending        Seizure disorder (HCC)   Assessment & Plan    Patient reports noncompliance with seizure medications, and possible seizure as cause for altered state on day of admission.   Continue home dose of dilantin and keppra.  No seizures witnessed here  Heavy etoh use at home likely contributed          History of CVA (cerebrovascular accident)   Assessment & Plan    Mild, chronic right sided weakness  Continue ASA and statin.        Alcohol withdrawal (HCC)- (present on admission)   Assessment & Plan    No signs of withdraw  Continue thiamine, folic acid, and  multivitamin.         Hypokalemia- (present on admission)   Assessment & Plan    Resolved        Hyponatremia- (present on admission)   Assessment & Plan    Mild  Improved          Quality-Core Measures

## 2018-05-13 NOTE — CARE PLAN
Problem: Communication  Goal: The ability to communicate needs accurately and effectively will improve  Outcome: PROGRESSING AS EXPECTED  Pt. Able to make needs known to nursing staff.     Problem: Safety  Goal: Will remain free from falls  Outcome: PROGRESSING AS EXPECTED  Pt. using call light and waiting for staff assistance prior to getting out of bed.

## 2018-05-13 NOTE — PROGRESS NOTES
Pt. alert and oriented to name and place with expressive aphasia and difficulty with word finding at times. Equal strengths bilaterally in upper and lower extremities. No IV access/MD aware. Bed alarm on. Call light in reach.

## 2018-05-14 VITALS
HEIGHT: 70 IN | DIASTOLIC BLOOD PRESSURE: 83 MMHG | BODY MASS INDEX: 31.31 KG/M2 | TEMPERATURE: 97.9 F | RESPIRATION RATE: 14 BRPM | SYSTOLIC BLOOD PRESSURE: 133 MMHG | HEART RATE: 67 BPM | WEIGHT: 218.7 LBS | OXYGEN SATURATION: 93 %

## 2018-05-14 PROBLEM — I63.9 CVA (CEREBRAL VASCULAR ACCIDENT) (HCC): Chronic | Status: RESOLVED | Noted: 2018-05-07 | Resolved: 2018-05-14

## 2018-05-14 PROBLEM — F10.939 ALCOHOL WITHDRAWAL (HCC): Status: RESOLVED | Noted: 2017-09-14 | Resolved: 2018-05-14

## 2018-05-14 PROCEDURE — A9270 NON-COVERED ITEM OR SERVICE: HCPCS | Performed by: HOSPITALIST

## 2018-05-14 PROCEDURE — 97535 SELF CARE MNGMENT TRAINING: CPT

## 2018-05-14 PROCEDURE — 700102 HCHG RX REV CODE 250 W/ 637 OVERRIDE(OP): Performed by: HOSPITALIST

## 2018-05-14 PROCEDURE — 99239 HOSP IP/OBS DSCHRG MGMT >30: CPT | Performed by: HOSPITALIST

## 2018-05-14 PROCEDURE — 700111 HCHG RX REV CODE 636 W/ 250 OVERRIDE (IP): Performed by: HOSPITALIST

## 2018-05-14 RX ORDER — ATORVASTATIN CALCIUM 40 MG/1
40 TABLET, FILM COATED ORAL EVERY EVENING
Qty: 30 TAB
Start: 2018-05-14

## 2018-05-14 RX ORDER — ASPIRIN 325 MG
325 TABLET ORAL DAILY
Qty: 100 TAB
Start: 2018-05-15

## 2018-05-14 RX ADMIN — METOPROLOL TARTRATE 25 MG: 25 TABLET, FILM COATED ORAL at 09:13

## 2018-05-14 RX ADMIN — STANDARDIZED SENNA CONCENTRATE AND DOCUSATE SODIUM 2 TABLET: 8.6; 5 TABLET, FILM COATED ORAL at 09:13

## 2018-05-14 RX ADMIN — ENOXAPARIN SODIUM 40 MG: 100 INJECTION SUBCUTANEOUS at 09:13

## 2018-05-14 RX ADMIN — PHENYTOIN 100 MG: 125 SUSPENSION ORAL at 00:12

## 2018-05-14 RX ADMIN — LEVETIRACETAM 500 MG: 100 SOLUTION ORAL at 10:10

## 2018-05-14 RX ADMIN — ASPIRIN 325 MG: 325 TABLET ORAL at 09:12

## 2018-05-14 RX ADMIN — PHENYTOIN 100 MG: 125 SUSPENSION ORAL at 10:10

## 2018-05-14 ASSESSMENT — PAIN SCALES - GENERAL
PAINLEVEL_OUTOF10: 0

## 2018-05-14 ASSESSMENT — COGNITIVE AND FUNCTIONAL STATUS - GENERAL
DRESSING REGULAR LOWER BODY CLOTHING: A LITTLE
PERSONAL GROOMING: A LITTLE
DAILY ACTIVITIY SCORE: 19
DRESSING REGULAR UPPER BODY CLOTHING: A LITTLE
SUGGESTED CMS G CODE MODIFIER DAILY ACTIVITY: CK
TOILETING: A LITTLE
HELP NEEDED FOR BATHING: A LITTLE

## 2018-05-14 NOTE — DISCHARGE PLANNING
Most recent therapy notes faxed to [facility]. [facility] has declined patient as they do not have OT services at this time.

## 2018-05-14 NOTE — CARE PLAN
Problem: Communication  Goal: The ability to communicate needs accurately and effectively will improve  Outcome: PROGRESSING AS EXPECTED      Problem: Safety  Goal: Will remain free from falls  Outcome: PROGRESSING AS EXPECTED      Problem: Mobility  Goal: Risk for activity intolerance will decrease  Outcome: PROGRESSING AS EXPECTED

## 2018-05-14 NOTE — PROGRESS NOTES
Pt. Alert and oriented x 3 with disorientation to time. ROMO's and following commands. Equal strength bilaterally in upper and lower extremities. Ambulating to and from bathroom via SBA and tolerates well. Using call light appropriately. Bed alarm on. Hourly rounding in place.

## 2018-05-14 NOTE — DISCHARGE INSTRUCTIONS
"Discharge Instructions    Discharged to other by medical transportation with escort. Discharged via wheelchair, hospital escort: Yes.  Special equipment needed: Not Applicable    Be sure to schedule a follow-up appointment with your primary care doctor or any specialists as instructed.     Discharge Plan:   Influenza Vaccine Indication: Patient Refuses    I understand that a diet low in cholesterol, fat, and sodium is recommended for good health. Unless I have been given specific instructions below for another diet, I accept this instruction as my diet prescription.   Other diet: Low Sodium, low cholesterol    Special Instructions:     Stroke/CVA/TIA/Hemorrhagic Ischemia Discharge Instructions  You have had a stroke. Your risk factors have been identified as follows:  Age - Over 55  Gender - Men are at a higher risk than women  High blood pressure  Heart disease  Previous TIAs or \"mini strokes\"  High Cholesterol and lipids  Alcohol or drug abuse  It is important that you reduce your risk factors to avoid another stroke in the future. Here are some general guidelines to follow:  · Eat healthy - avoid food high in fat.  · Get regular exercise.  · Maintain a healthy weight.  · Avoid smoking.  · Avoid alcohol and illegal drug use.  · Take your medications as directed.  For more information regarding risk factors, refer to pages 17-19 in your Stroke Patient Education Guide. Stroke Education Guide was given to patient.    Warning signs of a stroke include (which can also be found on page 3 of your Stroke Patient Education Guide):  · Sudden numbness of weakness of the face, arm or leg (especially on one side of the body).  · Sudden confusion, trouble speaking or understanding.  · Sudden trouble seeing in one or both eyes.  · Sudden trouble walking, dizziness, loss of balance or coordination.  · Sudden severe headache with no known cause.  It is very important to get treatment quickly when a stroke occurs. If you experience " any of the above warning signs, call [phone] immediately.     Some patients who have had a stroke will be going home on a blood thinner medication called Warfarin (Coumadin).  This medication requires very close monitoring and follow up.  This follow up can be provided by either your Primary Care Physician or by [facility].  The Outpatient Anticoagulation Service is located at the [facility] at [facility] ([facility]).  If you do not know when your follow up appointment is scheduled, call [phone] to verify your appointment time.      · Is patient discharged on Warfarin / Coumadin?   No     Depression / Suicide Risk    As you are discharged from this [facility], it is important to learn how to keep safe from harming yourself.    Recognize the warning signs:  · Abrupt changes in personality, positive or negative- including increase in energy   · Giving away possessions  · Change in eating patterns- significant weight changes-  positive or negative  · Change in sleeping patterns- unable to sleep or sleeping all the time   · Unwillingness or inability to communicate  · Depression  · Unusual sadness, discouragement and loneliness  · Talk of wanting to die  · Neglect of personal appearance   · Rebelliousness- reckless behavior  · Withdrawal from people/activities they love  · Confusion- inability to concentrate     If you or a loved one observes any of these behaviors or has concerns about self-harm, here's what you can do:  · Talk about it- your feelings and reasons for harming yourself  · Remove any means that you might use to hurt yourself (examples: pills, rope, extension cords, firearm)  · Get professional help from the community (Mental Health, Substance Abuse, psychological counseling)  · Do not be alone:Call your Safe Contact- someone whom you trust who will be there for you.  · Call your local CRISIS HOTLINE  612-8650 or 791-733-9599  · Call your local Children's Mobile Crisis Response Team [place] (675) 143-0548 or www.[employer].Intransa  · Call the toll free National Suicide Prevention Hotlines   · National Suicide Prevention Lifeline 414-279-YQDE (3405)  · National [place] Line Network 800-SUICIDE (524-5335)

## 2018-05-14 NOTE — CARE PLAN
Problem: Communication  Goal: The ability to communicate needs accurately and effectively will improve  Outcome: PROGRESSING AS EXPECTED  Using call light appropriately for all needs and concerns.     Problem: Mobility  Goal: Risk for activity intolerance will decrease  Outcome: PROGRESSING AS EXPECTED  Ambulating to bathroom via SBA and steady gait.

## 2018-05-14 NOTE — DISCHARGE PLANNING
Received transport form from Alida(RADHA). Arranged patient's transport to [facility] at 1700 via [employer]. Alida(RADHA) notified of transport time.

## 2018-05-14 NOTE — DISCHARGE SUMMARY
CHIEF COMPLAINT ON ADMISSION  No chief complaint on file.      CODE STATUS  Full Code    HPI & HOSPITAL COURSE  Please see history and physical dictated by Dr. Leyva on 5/6/18 for further details.  This is a 63 y.o. year old male with history of previous strokes, a seizure disorder and alcohol abuse who was admitted here after a family member noted some recent right sided facial droop and slurred speech.  Reportedly his speech is often slurred due to frequent alcohol intoxication.  He reportedly is also non complaint with his chronic seizure medications.    He was treated for alcohol withdraw.  A stroke work up revealed no acute stroke, his deficits are likely due to his previous strokes and likely vascular dementia as well.  His family is unable to care for his full time at present and he will be discharged to a skilled nursing facility.    Therefore, he is discharged in fair and stable condition for further post-acute management.     SPECIFIC OUTPATIENT FOLLOW-UP  PCP    DISCHARGE PROBLEM LIST  Principal Problem (Resolved):    CVA (cerebral vascular accident) (HCC) (Chronic) POA: Unknown  Active Problems:    Hyponatremia POA: Yes      Overview:  6/9      NA tablets. 482mg po bid      Dr. Lane     History of CVA (cerebrovascular accident) POA: Unknown    Seizure disorder (HCC) POA: Unknown  Resolved Problems:    Hypokalemia POA: Yes      Overview: K 4.0 on 6-9.    Alcohol withdrawal (HCC) POA: Yes  Physical Exam   Constitutional: He is oriented to person, and place. He is in no distress   HENT:   Head: Atraumatic.   Nose: Nose normal.   Mouth/Throat: No oropharyngeal exudate.   Facial droop, stable   Eyes: Conjunctivae and EOM are normal. Pupils are equal, round, and reactive to light. Right eye exhibits no discharge. Left eye exhibits no discharge. No scleral icterus.   Neck: Normal range of motion. Neck supple. No JVD present. No tracheal deviation present. No thyromegaly present.   Cardiovascular: Normal  rate, regular rhythm, normal heart sounds and intact distal pulses.  Exam reveals no gallop and no friction rub.    No murmur heard.  Pulmonary/Chest: Effort normal and breath sounds normal. No stridor. No respiratory distress. He has no wheezes. He has no rales. He exhibits no tenderness.   Abdominal: Soft. Bowel sounds are normal. He exhibits no distension and no mass. There is no tenderness. There is no rebound and no guarding.   Musculoskeletal: Normal range of motion. He exhibits no edema, tenderness or deformity.   Lymphadenopathy:     He has no cervical adenopathy.   Neurological: He is alert and oriented to person, place, and time. He has normal reflexes. No cranial nerve deficit. He exhibits normal muscle tone. Coordination normal.   Mild chronic R UE weakness   Skin: Skin is warm and dry. No rash noted. He is not diaphoretic. No erythema. No pallor.   Psychiatric: He has a normal mood and affect. His behavior is normal. Judgment and thought content normal.   Nursing note and vitals reviewed.    FOLLOW UP  No future appointments.  No follow-up provider specified.    MEDICATIONS ON DISCHARGE   Crescencio Eller   Home Medication Instructions KIRSTY:[id number]    Printed on:05/14/18 9015   Medication Information                      aspirin (ASA) 325 MG Tab  Take 1 Tab by mouth every day.             atorvastatin (LIPITOR) 40 MG Tab  Take 1 Tab by mouth every evening.             docusate sodium (COLACE) 100 MG CAPS  Take 100 mg by mouth 2 times a day. TAKE ONE CAP WITH BREAKFAST AND DINNER. STOOL SOFTENER-HOLD FOR LOOSE STOOLS.              folic acid (FOLVITE) 1 MG Tab  Take 1 Tab by mouth every day.             levetiracetam (KEPPRA) 100 MG/ML Solution  Take 5 mL by mouth every 12 hours.             metoprolol (LOPRESSOR) 25 MG TABS  Take  by mouth. TAKE ONE TAB EVERY MORNING TO CONTROL BLOOD PRESSURE              Multiple Vitamins-Minerals (THERAGRAN-M PO)  Take  by mouth. MULTIPLE VITAMIN-TAKE ONE EVERY DAY WITH  LUNCH              phenytoin (DILANTIN) 100 MG/4ML Suspension  Take 4 mL by mouth every 8 hours.             thiamine (THIAMINE) 100 MG tablet  Take 1 Tab by mouth every day.                 DIET  Orders Placed This Encounter   Procedures   • DIET ORDER     Standing Status:   Standing     Number of Occurrences:   1     Order Specific Question:   Diet:     Answer:   2 Gram Sodium [7]     Order Specific Question:   Macronutrient modifications:     Answer:   Low Cholesterol [7]       ACTIVITY  As tolerated.    LINES, DRAINS, AND WOUNDS  This is an automated list. Peripheral IVs will be removed prior to discharge.                     MENTAL STATUS ON TRANSFER  Level of Consciousness: Alert  Orientation : Disoriented to Time, Disoriented to Event  Speech: Expressive Aphasia    CONSULTATIONS  Neurology    PROCEDURES  CAROTID DUPLEX   Final Result      MR-BRAIN-W/O   Final Result      1.  Multifocal chronic infarcts in the bilateral temporal and left frontal lobes.   2.  Left hippocampal sclerosis.   3.  Moderate to severe cerebral atrophy.   4.  Mild chronic microvascular ischemic disease.      ECHOCARDIOGRAM COMP W/O CONT   Final Result      RC-[id number]-4 VIEW   Final Result      No evidence of abdominal or pelvic implanted electronic device.      DX-CHEST-LIMITED (1 VIEW)   Final Result      No electrodes are generators identified.      DX-SKULL-LIMITED 3-   Final Result      No vascular clips or electrical generators identified.      ECHOCARDIOGRAM-COMP W/ CONT    (Results Pending)         LABORATORY  Lab Results   Component Value Date/Time    SODIUM 134 (L) 05/08/2018 03:46 AM    POTASSIUM 4.0 05/08/2018 03:46 AM    CHLORIDE 101 05/08/2018 03:46 AM    CO2 28 05/08/2018 03:46 AM    GLUCOSE 91 05/08/2018 03:46 AM    BUN 11 05/08/2018 03:46 AM    CREATININE 0.60 05/08/2018 03:46 AM        Lab Results   Component Value Date/Time    WBC 5.4 05/08/2018 03:45 AM    HEMOGLOBIN 11.9 (L) 05/08/2018 03:45 AM    HEMATOCRIT 34.8  (L) 05/08/2018 03:45 AM    PLATELETCT 235 05/08/2018 03:45 AM        Total time of the discharge process exceeds 38 minutes.

## 2018-05-14 NOTE — DISCHARGE PLANNING
RADHA spoke with pt to see if he prefers [facility] or [facility]. Pt did not answer orientation questions appropriately. Pt unsure of what month it is, his birth date, and where he is besides that he is in [place]. RADHA called pt's sister Daisy who chose [facility] for pt. RADHA discussed [employer] and [facility]. Verbal auth obtained for both from Daisy.     RADHA faxed transport forms to [facility].

## 2018-05-15 NOTE — PROGRESS NOTES
Went over discharge papers with pt, pt is confused, unable to sign papers. [facility] is here; pt leaves unit via WC escorted by [employer] to go to [place]. Pt is stable and has personal belongings.

## 2021-01-01 NOTE — PROGRESS NOTES
2 RN skin check complete. Right leg abrasion that has scabbed over and left leg abrasions.    Was done for at least one hour
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
